# Patient Record
Sex: MALE | Race: WHITE
[De-identification: names, ages, dates, MRNs, and addresses within clinical notes are randomized per-mention and may not be internally consistent; named-entity substitution may affect disease eponyms.]

---

## 2021-03-22 ENCOUNTER — HOSPITAL ENCOUNTER (INPATIENT)
Dept: HOSPITAL 47 - EC | Age: 51
LOS: 10 days | Discharge: HOME HEALTH | DRG: 216 | End: 2021-04-01
Attending: THORACIC SURGERY (CARDIOTHORACIC VASCULAR SURGERY) | Admitting: INTERNAL MEDICINE
Payer: COMMERCIAL

## 2021-03-22 VITALS — BODY MASS INDEX: 31.5 KG/M2

## 2021-03-22 DIAGNOSIS — Z98.890: ICD-10-CM

## 2021-03-22 DIAGNOSIS — I21.4: Primary | ICD-10-CM

## 2021-03-22 DIAGNOSIS — Z72.89: ICD-10-CM

## 2021-03-22 DIAGNOSIS — Z22.321: ICD-10-CM

## 2021-03-22 DIAGNOSIS — I34.0: ICD-10-CM

## 2021-03-22 DIAGNOSIS — I50.23: ICD-10-CM

## 2021-03-22 DIAGNOSIS — I25.10: ICD-10-CM

## 2021-03-22 DIAGNOSIS — Z79.4: ICD-10-CM

## 2021-03-22 DIAGNOSIS — N17.9: ICD-10-CM

## 2021-03-22 DIAGNOSIS — E11.65: ICD-10-CM

## 2021-03-22 DIAGNOSIS — Z20.822: ICD-10-CM

## 2021-03-22 DIAGNOSIS — Z82.49: ICD-10-CM

## 2021-03-22 DIAGNOSIS — G45.9: ICD-10-CM

## 2021-03-22 DIAGNOSIS — Z90.89: ICD-10-CM

## 2021-03-22 DIAGNOSIS — J98.11: ICD-10-CM

## 2021-03-22 DIAGNOSIS — R19.7: ICD-10-CM

## 2021-03-22 DIAGNOSIS — D62: ICD-10-CM

## 2021-03-22 DIAGNOSIS — E78.5: ICD-10-CM

## 2021-03-22 DIAGNOSIS — K59.00: ICD-10-CM

## 2021-03-22 DIAGNOSIS — R04.0: ICD-10-CM

## 2021-03-22 DIAGNOSIS — I25.5: ICD-10-CM

## 2021-03-22 DIAGNOSIS — M81.0: ICD-10-CM

## 2021-03-22 DIAGNOSIS — I27.20: ICD-10-CM

## 2021-03-22 DIAGNOSIS — E78.1: ICD-10-CM

## 2021-03-22 LAB
ALBUMIN SERPL-MCNC: 4 G/DL (ref 3.5–5)
ALP SERPL-CCNC: 94 U/L (ref 38–126)
ALT SERPL-CCNC: 17 U/L (ref 4–49)
ANION GAP SERPL CALC-SCNC: 10 MMOL/L
APTT BLD: 23.1 SEC (ref 22–30)
AST SERPL-CCNC: 43 U/L (ref 17–59)
BASOPHILS # BLD AUTO: 0 K/UL (ref 0–0.2)
BASOPHILS NFR BLD AUTO: 0 %
BUN SERPL-SCNC: 20 MG/DL (ref 9–20)
CALCIUM SPEC-MCNC: 9 MG/DL (ref 8.4–10.2)
CHLORIDE SERPL-SCNC: 102 MMOL/L (ref 98–107)
CO2 SERPL-SCNC: 25 MMOL/L (ref 22–30)
D DIMER PPP FEU-MCNC: 0.59 MG/L FEU (ref ?–0.6)
EOSINOPHIL # BLD AUTO: 0.1 K/UL (ref 0–0.7)
EOSINOPHIL NFR BLD AUTO: 2 %
ERYTHROCYTE [DISTWIDTH] IN BLOOD BY AUTOMATED COUNT: 4.62 M/UL (ref 4.3–5.9)
ERYTHROCYTE [DISTWIDTH] IN BLOOD: 12.3 % (ref 11.5–15.5)
GLUCOSE BLD-MCNC: 302 MG/DL (ref 75–99)
GLUCOSE SERPL-MCNC: 317 MG/DL (ref 74–99)
HCT VFR BLD AUTO: 42.5 % (ref 39–53)
HGB BLD-MCNC: 14.6 GM/DL (ref 13–17.5)
INR PPP: 0.9 (ref ?–1.2)
LYMPHOCYTES # SPEC AUTO: 1 K/UL (ref 1–4.8)
LYMPHOCYTES NFR SPEC AUTO: 14 %
MAGNESIUM SPEC-SCNC: 1.9 MG/DL (ref 1.6–2.3)
MCH RBC QN AUTO: 31.6 PG (ref 25–35)
MCHC RBC AUTO-ENTMCNC: 34.3 G/DL (ref 31–37)
MCV RBC AUTO: 92.1 FL (ref 80–100)
MONOCYTES # BLD AUTO: 0.4 K/UL (ref 0–1)
MONOCYTES NFR BLD AUTO: 5 %
NEUTROPHILS # BLD AUTO: 6 K/UL (ref 1.3–7.7)
NEUTROPHILS NFR BLD AUTO: 78 %
PLATELET # BLD AUTO: 347 K/UL (ref 150–450)
POTASSIUM SERPL-SCNC: 4.4 MMOL/L (ref 3.5–5.1)
PROT SERPL-MCNC: 7.1 G/DL (ref 6.3–8.2)
PT BLD: 10.2 SEC (ref 9–12)
SODIUM SERPL-SCNC: 137 MMOL/L (ref 137–145)
WBC # BLD AUTO: 7.6 K/UL (ref 3.8–10.6)

## 2021-03-22 PROCEDURE — 85379 FIBRIN DEGRADATION QUANT: CPT

## 2021-03-22 PROCEDURE — 93970 EXTREMITY STUDY: CPT

## 2021-03-22 PROCEDURE — 86891 AUTOLOGOUS BLOOD OP SALVAGE: CPT

## 2021-03-22 PROCEDURE — 96374 THER/PROPH/DIAG INJ IV PUSH: CPT

## 2021-03-22 PROCEDURE — 93320 DOPPLER ECHO COMPLETE: CPT

## 2021-03-22 PROCEDURE — 70498 CT ANGIOGRAPHY NECK: CPT

## 2021-03-22 PROCEDURE — 86920 COMPATIBILITY TEST SPIN: CPT

## 2021-03-22 PROCEDURE — 81003 URINALYSIS AUTO W/O SCOPE: CPT

## 2021-03-22 PROCEDURE — 80061 LIPID PANEL: CPT

## 2021-03-22 PROCEDURE — 80074 ACUTE HEPATITIS PANEL: CPT

## 2021-03-22 PROCEDURE — 94640 AIRWAY INHALATION TREATMENT: CPT

## 2021-03-22 PROCEDURE — 94002 VENT MGMT INPAT INIT DAY: CPT

## 2021-03-22 PROCEDURE — 83036 HEMOGLOBIN GLYCOSYLATED A1C: CPT

## 2021-03-22 PROCEDURE — 93458 L HRT ARTERY/VENTRICLE ANGIO: CPT

## 2021-03-22 PROCEDURE — 82805 BLOOD GASES W/O2 SATURATION: CPT

## 2021-03-22 PROCEDURE — 93930 UPPER EXTREMITY STUDY: CPT

## 2021-03-22 PROCEDURE — 87635 SARS-COV-2 COVID-19 AMP PRB: CPT

## 2021-03-22 PROCEDURE — 85730 THROMBOPLASTIN TIME PARTIAL: CPT

## 2021-03-22 PROCEDURE — 99285 EMERGENCY DEPT VISIT HI MDM: CPT

## 2021-03-22 PROCEDURE — 94150 VITAL CAPACITY TEST: CPT

## 2021-03-22 PROCEDURE — 83735 ASSAY OF MAGNESIUM: CPT

## 2021-03-22 PROCEDURE — B2111ZZ FLUOROSCOPY OF MULTIPLE CORONARY ARTERIES USING LOW OSMOLAR CONTRAST: ICD-10-PCS

## 2021-03-22 PROCEDURE — 70496 CT ANGIOGRAPHY HEAD: CPT

## 2021-03-22 PROCEDURE — 84443 ASSAY THYROID STIM HORMONE: CPT

## 2021-03-22 PROCEDURE — 85027 COMPLETE CBC AUTOMATED: CPT

## 2021-03-22 PROCEDURE — 80048 BASIC METABOLIC PNL TOTAL CA: CPT

## 2021-03-22 PROCEDURE — 86900 BLOOD TYPING SEROLOGIC ABO: CPT

## 2021-03-22 PROCEDURE — 93306 TTE W/DOPPLER COMPLETE: CPT

## 2021-03-22 PROCEDURE — 71045 X-RAY EXAM CHEST 1 VIEW: CPT

## 2021-03-22 PROCEDURE — 80053 COMPREHEN METABOLIC PANEL: CPT

## 2021-03-22 PROCEDURE — 93923 UPR/LXTR ART STDY 3+ LVLS: CPT

## 2021-03-22 PROCEDURE — 85610 PROTHROMBIN TIME: CPT

## 2021-03-22 PROCEDURE — 86850 RBC ANTIBODY SCREEN: CPT

## 2021-03-22 PROCEDURE — 93325 DOPPLER ECHO COLOR FLOW MAPG: CPT

## 2021-03-22 PROCEDURE — 71046 X-RAY EXAM CHEST 2 VIEWS: CPT

## 2021-03-22 PROCEDURE — 82330 ASSAY OF CALCIUM: CPT

## 2021-03-22 PROCEDURE — 94760 N-INVAS EAR/PLS OXIMETRY 1: CPT

## 2021-03-22 PROCEDURE — 4A023N7 MEASUREMENT OF CARDIAC SAMPLING AND PRESSURE, LEFT HEART, PERCUTANEOUS APPROACH: ICD-10-PCS

## 2021-03-22 PROCEDURE — 84484 ASSAY OF TROPONIN QUANT: CPT

## 2021-03-22 PROCEDURE — 93308 TTE F-UP OR LMTD: CPT

## 2021-03-22 PROCEDURE — 85025 COMPLETE CBC W/AUTO DIFF WBC: CPT

## 2021-03-22 PROCEDURE — 70450 CT HEAD/BRAIN W/O DYE: CPT

## 2021-03-22 PROCEDURE — 83880 ASSAY OF NATRIURETIC PEPTIDE: CPT

## 2021-03-22 PROCEDURE — 93880 EXTRACRANIAL BILAT STUDY: CPT

## 2021-03-22 PROCEDURE — 93312 ECHO TRANSESOPHAGEAL: CPT

## 2021-03-22 PROCEDURE — 93005 ELECTROCARDIOGRAM TRACING: CPT

## 2021-03-22 PROCEDURE — 87070 CULTURE OTHR SPECIMN AEROBIC: CPT

## 2021-03-22 PROCEDURE — 86901 BLOOD TYPING SEROLOGIC RH(D): CPT

## 2021-03-22 PROCEDURE — 36415 COLL VENOUS BLD VENIPUNCTURE: CPT

## 2021-03-22 RX ADMIN — INSULIN ASPART SCH UNIT: 100 INJECTION, SOLUTION INTRAVENOUS; SUBCUTANEOUS at 22:32

## 2021-03-22 RX ADMIN — METOPROLOL TARTRATE SCH MG: 25 TABLET, FILM COATED ORAL at 22:32

## 2021-03-22 RX ADMIN — INSULIN ASPART SCH: 100 INJECTION, SOLUTION INTRAVENOUS; SUBCUTANEOUS at 19:38

## 2021-03-22 NOTE — ED
General Adult HPI





- General


Chief complaint: Upper Respiratory Infection


Stated complaint: fatigue, vomiting


Time Seen by Provider: 03/22/21 10:42


Source: patient


Mode of arrival: ambulatory


Limitations: no limitations





- History of Present Illness


Initial comments: 


51-year-old male patient presents to the emergency department today for 

evaluation of chest pressure and general malaise.  Patient states his been 

feeling unwell for the last couple of weeks.  States that he initially was just 

feeling very fatigued, chilled, rundown.  States he had some episodes of 

diarrhea early in the illness.  States that the symptoms lasted for about a week

and then started to improve.  Patient states over the last few days his symptoms

have worsened.  States he's been having heavy chest pressure especially at 

night.  States he does feel short of breath with this.  Denies any cough or 

congestion.  Denies any fevers but states he has been chilled.  States he did 

have episode of vomiting last night.  Does have a history of diabetes.  Has 

extensive family history of cardiac disease.   Patient denies any recent rash, 

abdominal pain, nausea, vomiting, back pain, numbness, tingling, dizziness, 

weakness, hematuria, dysuria, urinary urgency, urinary frequency, headache, 

visual changes, or any other complaints.





- Related Data


                                Home Medications











 Medication  Instructions  Recorded  Confirmed


 


Insulin Aspart [NovoLOG Flexpen] See Protocol SQ AC-TID 03/22/21 03/22/21


 


Insulin Glargine,Hum.rec.anlog 20 unit SQ DAILY 03/22/21 03/22/21





[Lantus Solostar]   











                                    Allergies











Allergy/AdvReac Type Severity Reaction Status Date / Time


 


No Known Allergies Allergy   Verified 03/22/21 13:06














Review of Systems


ROS Statement: 


Those systems with pertinent positive or pertinent negative responses have been 

documented in the HPI.





ROS Other: All systems not noted in ROS Statement are negative.





Past Medical History


Past Medical History: Diabetes Mellitus


History of Any Multi-Drug Resistant Organisms: None Reported


Past Surgical History: No Surgical Hx Reported


Past Psychological History: No Psychological Hx Reported


Smoking Status: Never smoker


Past Alcohol Use History: Daily


Past Drug Use History: None Reported





General Exam


Limitations: no limitations


General appearance: alert, in no apparent distress, other (This is a well-

developed, well-nourished adult male patient in no acute distress.  Vital signs 

upon presentation temperature 98.9F, pulse 107, respirations 20, blood pressure

128/83, pulse ox 98% on room air.)


Eye exam: Present: normal appearance, PERRL, EOMI.  Absent: scleral icterus, 

conjunctival injection, periorbital swelling


ENT exam: Present: normal exam, normal oropharynx, mucous membranes moist


Respiratory exam: Present: normal lung sounds bilaterally.  Absent: respiratory 

distress, wheezes, rales, rhonchi, stridor


Cardiovascular Exam: Present: normal rhythm, tachycardia, normal heart sounds.  

Absent: systolic murmur, diastolic murmur, rubs, gallop, clicks


GI/Abdominal exam: Present: soft, normal bowel sounds.  Absent: distended, 

tenderness, guarding, rebound, rigid


Neurological exam: Present: alert, oriented X3, CN II-XII intact


Psychiatric exam: Present: normal affect, normal mood


Skin exam: Present: warm, dry, intact, normal color.  Absent: rash





Course


                                   Vital Signs











  03/22/21 03/22/21





  10:32 12:47


 


Temperature 98.9 F 


 


Pulse Rate 107 H 105 H


 


Respiratory 20 18





Rate  


 


Blood Pressure 128/83 137/92


 


O2 Sat by Pulse 98 96





Oximetry  














EKG Findings





- EKG Comments:


EKG Findings:: EKG obtained at 1122 shows sinus tachycardia with a ventricular 

rate of 105, KS Interval 176, QRS duration 108, , .  No evidence of

ST elevation or depression.





Medical Decision Making





- Medical Decision Making


51-year-old male patient presents to the emergency department today for 

evaluation of chest pressure and shortness of breath.  Patient feeling unwell 

for the last couple of weeks.  Has history of diabetes an extensive family 

history of coronary artery disease.  Physical examination reveals clear equal 

lung sounds.  Abdomen soft and nontender.  EKG showed some ST depression in V5 

and V4.  T-wave inversion in lead 3.  Labs reviewed and did reveal elevated 

troponin 3.1.  Patient symptoms and findings are consistent with NSTEMI.  He'll 

be admitted to the hospital for further cardiac evaluation.  He was given 

aspirin and started on heparin drip.  Case discussed with my attending Dr. Juan.








- Lab Data


Result diagrams: 


                                 03/22/21 11:29





                                 03/22/21 11:29


                                   Lab Results











  03/22/21 03/22/21 03/22/21 Range/Units





  11:29 11:29 11:29 


 


WBC  7.6    (3.8-10.6)  k/uL


 


RBC  4.62    (4.30-5.90)  m/uL


 


Hgb  14.6    (13.0-17.5)  gm/dL


 


Hct  42.5    (39.0-53.0)  %


 


MCV  92.1    (80.0-100.0)  fL


 


MCH  31.6    (25.0-35.0)  pg


 


MCHC  34.3    (31.0-37.0)  g/dL


 


RDW  12.3    (11.5-15.5)  %


 


Plt Count  347    (150-450)  k/uL


 


MPV  6.4    


 


Neutrophils %  78    %


 


Lymphocytes %  14    %


 


Monocytes %  5    %


 


Eosinophils %  2    %


 


Basophils %  0    %


 


Neutrophils #  6.0    (1.3-7.7)  k/uL


 


Lymphocytes #  1.0    (1.0-4.8)  k/uL


 


Monocytes #  0.4    (0-1.0)  k/uL


 


Eosinophils #  0.1    (0-0.7)  k/uL


 


Basophils #  0.0    (0-0.2)  k/uL


 


PT   10.2   (9.0-12.0)  sec


 


INR   0.9   (<1.2)  


 


APTT   23.1   (22.0-30.0)  sec


 


D-Dimer   0.59   (<0.60)  mg/L FEU


 


Sodium    137  (137-145)  mmol/L


 


Potassium    4.4  (3.5-5.1)  mmol/L


 


Chloride    102  ()  mmol/L


 


Carbon Dioxide    25  (22-30)  mmol/L


 


Anion Gap    10  mmol/L


 


BUN    20  (9-20)  mg/dL


 


Creatinine    1.02  (0.66-1.25)  mg/dL


 


Est GFR (CKD-EPI)AfAm    >90  (>60 ml/min/1.73 sqM)  


 


Est GFR (CKD-EPI)NonAf    84  (>60 ml/min/1.73 sqM)  


 


Glucose    317 H  (74-99)  mg/dL


 


Calcium    9.0  (8.4-10.2)  mg/dL


 


Magnesium    1.9  (1.6-2.3)  mg/dL


 


Total Bilirubin    0.5  (0.2-1.3)  mg/dL


 


AST    43  (17-59)  U/L


 


ALT    17  (4-49)  U/L


 


Alkaline Phosphatase    94  ()  U/L


 


Troponin I     (0.000-0.034)  ng/mL


 


NT-Pro-B Natriuret Pep     pg/mL


 


Total Protein    7.1  (6.3-8.2)  g/dL


 


Albumin    4.0  (3.5-5.0)  g/dL


 


Coronavirus (PCR)     (Not Detectd)  














  03/22/21 03/22/21 03/22/21 Range/Units





  11:29 11:29 11:29 


 


WBC     (3.8-10.6)  k/uL


 


RBC     (4.30-5.90)  m/uL


 


Hgb     (13.0-17.5)  gm/dL


 


Hct     (39.0-53.0)  %


 


MCV     (80.0-100.0)  fL


 


MCH     (25.0-35.0)  pg


 


MCHC     (31.0-37.0)  g/dL


 


RDW     (11.5-15.5)  %


 


Plt Count     (150-450)  k/uL


 


MPV     


 


Neutrophils %     %


 


Lymphocytes %     %


 


Monocytes %     %


 


Eosinophils %     %


 


Basophils %     %


 


Neutrophils #     (1.3-7.7)  k/uL


 


Lymphocytes #     (1.0-4.8)  k/uL


 


Monocytes #     (0-1.0)  k/uL


 


Eosinophils #     (0-0.7)  k/uL


 


Basophils #     (0-0.2)  k/uL


 


PT     (9.0-12.0)  sec


 


INR     (<1.2)  


 


APTT     (22.0-30.0)  sec


 


D-Dimer     (<0.60)  mg/L FEU


 


Sodium     (137-145)  mmol/L


 


Potassium     (3.5-5.1)  mmol/L


 


Chloride     ()  mmol/L


 


Carbon Dioxide     (22-30)  mmol/L


 


Anion Gap     mmol/L


 


BUN     (9-20)  mg/dL


 


Creatinine     (0.66-1.25)  mg/dL


 


Est GFR (CKD-EPI)AfAm     (>60 ml/min/1.73 sqM)  


 


Est GFR (CKD-EPI)NonAf     (>60 ml/min/1.73 sqM)  


 


Glucose     (74-99)  mg/dL


 


Calcium     (8.4-10.2)  mg/dL


 


Magnesium     (1.6-2.3)  mg/dL


 


Total Bilirubin     (0.2-1.3)  mg/dL


 


AST     (17-59)  U/L


 


ALT     (4-49)  U/L


 


Alkaline Phosphatase     ()  U/L


 


Troponin I  3.120 H*    (0.000-0.034)  ng/mL


 


NT-Pro-B Natriuret Pep   3030   pg/mL


 


Total Protein     (6.3-8.2)  g/dL


 


Albumin     (3.5-5.0)  g/dL


 


Coronavirus (PCR)    Not Detected  (Not Detectd)  














- EKG Data


-: EKG Interpreted by Me


EKG Comments: 


EKG obtained 1122 shows sinus tachycardia with a ventricular rate of 105, KS 

interval 176, QRS duration 108, , .  He has some ST depression 

noted and V4 and V5.  T-wave inversion in lead 3.








- Radiology Data


Radiology results: report reviewed, image reviewed





One view x-ray of the chest is obtained.  Report reviewed in its entirety.  

Impression by Dr. Sousa shows patchy right perihilar infiltrate may reflect 

developing a morning.  Correlate clinically.





Disposition


Clinical Impression: 


 NSTEMI (non-ST elevated myocardial infarction)





Disposition: ADMITTED AS IP TO THIS [facility]


Condition: Serious


Decision to Admit Reason: Admit from [facility]


Decision Date: 03/22/21


Decision Time: 12:44

## 2021-03-22 NOTE — XR
EXAMINATION TYPE: XR chest 1V portable

 

DATE OF EXAM: 3/22/2021

 

HISTORY: Shortness of breath.

 

COMPARISON: None.

 

TECHNIQUE: Single view of the chest is submitted.

 

FINDINGS:

Demonstrated are scattered senescent parenchymal change.  

 

Patchy right perihilar infiltrate may reflect developing pneumonia. Correlate clinically progress ike
dies are recommended.

 

The heart is stable.

 

Hilar and mediastinal structures are within normal limits.  

 

Degenerative changes are seen of the dorsal spine. 

 

 IMPRESSION: 

 

1.  Patchy right perihilar infiltrate may reflect developing pneumonia. Correlate clinically progress
 studies are recommended.

## 2021-03-22 NOTE — P.HPIM
<Felton Amato - Last Filed: 03/22/21 13:35>





History of Present Illness


H&P Date: 03/22/21


Chief Complaint: Fatigue and chest heaviness





History of presenting illness:





Patient is a 51-year-old male with a past medical history of insulin-dependent 

diabetes mellitus.  He presented to [facility] with a chief 

complaint of fatigue and chest heaviness.  Patient reports he initially began 

feeling the symptoms of fatigue on 3/3/21 accompanied by diarrhea, nausea, 

chills, diaphoresis, and body aches. Pt reports these symptoms lasted for appro

ximately one week and then improved. Pt states this improvement only lasted a 

couple days and then his symptoms came back with a vengeance.  Patient reports 

over the past few days he has felt extremely winded, fatigued, and simply run 

down.  He has had chills and moderate diaphoresis.  Patient states in addition 

to this he has had a constant pressure to his midsternal chest.  Patient reports

this chest pressure feels like a constant heaviness to his midsternal chest 

which seems to worsen in the evenings and at night and is accompanied by 

shortness of breath.  Patient denies having any fevers, headache, 

lightheadedness, dizziness, cough or congestion, abdominal pain, or experiencing

any pain/swelling/weakness/tingling in extremities.  Patient denies a history of

cardiac disease, but reports a significant family history of cardiac disease 

with both of his brothers and father requiring open heart surgery with bypasses 

at a young age.  Patient denies history of tobacco use, drug use and denies EtOH

use/abuse.  Patient reports he follows with cardiologist Dr. Peñaloza in [place]. 

Patient was evaluated in the emergency department found to have an elevated 

troponin of 3.120 with an EKG showing sinus tachycardia at 105 bpm with T-wave 

inversion in leads III and aVF, no signs of ST elevation. Patient given aspirin 

324 mg in ED 1 dose followed by heparin bolus and infusion per ACS protocol for

NSTEMI.  Patient admitted under our services for continued close medical 

management with consult to cardiology.





Review of systems:





Pertinent positives and negatives as discussed in HPI, a complete review of 

systems was performed and all other systems are negative.





Physical exam:





General: non toxic, no distress, appears at stated age


Derm: warm, dry


Head: atraumatic, normocephalic, symmetric


Eyes: EOMI, no lid lag, anicteric sclera


Mouth: no lip lesion, mucus membranes moist


Cardiovascular: S1S2 normal with regular rate and rhythm.  No murmur, gallop, or

rub noted.  Posterior tibial pulses palpated bilaterally.  No lower extremity 

edema.


Lungs: Respirations even, regular, and unlabored on room air.  Lungs CTA 

bilaterally


Abdominal: soft, nontender to palpation, no guarding, no appreciable 

organomegaly


Ext: no gross muscle atrophy, no edema, no contractures


Neuro: CN II-XI grossly intact, no focal neuro deficits


Psych: Alert, oriented, appropriate affect 








Assessment and Plan of Care:





NSTEMI


-Troponin 3.120


-EKG showing sinus tachycardia at 105 bpm with T-wave inversion in leads III and

aVF, no signs of ST elevation.  No previous EKGs available for comparison.


-Patient given aspirin 324 mg in ED 1 dose followed by heparin bolus and 

infusion per ACS protocol for NSTEMI.


-Continue heparin infusion, pharmacy to dose.


-Cardiology consult.


-Telemetry monitoring


-Trend troponins every 3 hours 2.


-Close monitoring of I's and O's


-Heart healthy carb consistent diet, NPO at midnight


-Patient being placed on daily aspirin, atorvastatin, and metoprolol.


-Lipid profile and Hgb A1c with a.m. labs. 





Insulin-dependent diabetes mellitus type II


-Glycemic protocol with NovoLog sliding scale and continuation of Lantus 20 

units daily.


-Heart healthy carb consistent diet








The patient is admitted with an anticipated greater than 2 midnight stay for 

evaluation of NSTEMI.


Surrogate decision-maker: Self


CODE STATUS: Full code


DVT prophylaxis: Heparin


Discussed with: Patient


Anticipated discharge date: Clinical course to determine


Anticipated discharge place: Home


A total of 45 minutes was spent on the care of this complex patient more than 

50% of the time was spent in counseling and care coordination.











Past Medical History


Past Medical History: Diabetes Mellitus


History of Any Multi-Drug Resistant Organisms: None Reported


Past Surgical History: No Surgical Hx Reported


Past Psychological History: No Psychological Hx Reported


Smoking Status: Never smoker


Past Alcohol Use History: Daily


Past Drug Use History: None Reported





Medications and Allergies


                                Home Medications











 Medication  Instructions  Recorded  Confirmed  Type


 


Insulin Aspart [NovoLOG Flexpen] See Protocol SQ AC-TID 03/22/21 03/22/21 

History


 


Insulin Glargine,Hum.rec.anlog 20 unit SQ DAILY 03/22/21 03/22/21 History





[Mari Julien]    








                                    Allergies











Allergy/AdvReac Type Severity Reaction Status Date / Time


 


No Known Allergies Allergy   Verified 03/22/21 13:06














Physical Exam


Vitals: 


                                   Vital Signs











  Temp Pulse Resp BP Pulse Ox


 


 03/22/21 12:47   105 H  18  137/92  96


 


 03/22/21 10:32  98.9 F  107 H  20  128/83  98








                                Intake and Output











 03/21/21 03/22/21 03/22/21





 22:59 06:59 14:59


 


Other:   


 


  Weight   81.647 kg














Results


CBC & Chem 7: 


                                 03/22/21 11:29





                                 03/22/21 11:29


Labs: 


                  Abnormal Lab Results - Last 24 Hours (Table)











  03/22/21 03/22/21 Range/Units





  11:29 11:29 


 


Glucose  317 H   (74-99)  mg/dL


 


Troponin I   3.120 H*  (0.000-0.034)  ng/mL














<Casi Rudolph - Last Filed: 03/22/21 20:33>





Physical Exam


Osteopathic Statement: *.  No significant issues noted on an osteopathic 

structural exam other than those noted in the History and Physical/Consult.


Vitals: 


                                   Vital Signs











  Temp Pulse Resp BP Pulse Ox


 


 03/22/21 20:00   116 H  18  138/73  91 L


 


 03/22/21 17:37  98.2 F  105 H  18  139/96  93 L


 


 03/22/21 15:50   97  18  138/93  97


 


 03/22/21 12:47   105 H  18  137/92  96


 


 03/22/21 10:32  98.9 F  107 H  20  128/83  98








                                Intake and Output











 03/22/21 03/22/21 03/22/21





 06:59 14:59 22:59


 


Other:   


 


  Weight  81.647 kg 














Results


CBC & Chem 7: 


                                 03/22/21 11:29





                                 03/22/21 11:29


Labs: 


                  Abnormal Lab Results - Last 24 Hours (Table)











  03/22/21 03/22/21 03/22/21 Range/Units





  11:29 11:29 14:09 


 


APTT     (22.0-30.0)  sec


 


Glucose  317 H    (74-99)  mg/dL


 


Troponin I   3.120 H*  6.080 H*  (0.000-0.034)  ng/mL














  03/22/21 03/22/21 Range/Units





  18:38 18:38 


 


APTT  30.1 H   (22.0-30.0)  sec


 


Glucose    (74-99)  mg/dL


 


Troponin I   7.840 H*  (0.000-0.034)  ng/mL














Assessment and Plan


Assessment: 


Patient seen and examined independently.  Patient was also seen by Felton Amato NP and case was discussed.  I am in agreement with subjective, physical exam, 

assessment and plan as written above and amended below.





Currently chest pain-free.  No shortness of breath.  No nausea.  No vomiting.





General: non toxic, no distress, appears at stated age


Derm: warm, dry


Head: atraumatic, normocephalic, symmetric


Eyes: EOMI, no lid lag, anicteric sclera


Mouth: no lip lesion, mucus membranes moist


Cardiovascular: S1S2 reg, no murmur, positive posterior tibial pulse bilateral, 


Lungs: CTA bilateral, no rhonchi, no rales , no accessory muscle use


Abdominal: soft,  nontender to palpation, no guarding, no appreciable 

organomegaly


Ext: no gross muscle atrophy, no edema, no contractures


Neuro:  CN II-XI grossly intact, no focal neuro deficits


Psych: Alert, oriented, appropriate affect 





Non-STEMI


-Heparin drip


-Beta blocker


-Aspirin


-Await cardiology recommendations


-Patient would like cardiology to discuss his case with his outpatient 

cardiologist.


-Nothing by mouth after midnight in anticipation of cardiac catheterization


- D/W nursing to alert cardiology of incresaed trop from earlier today.

## 2021-03-23 LAB
ANION GAP SERPL CALC-SCNC: 4 MMOL/L
BASOPHILS # BLD AUTO: 0 K/UL (ref 0–0.2)
BASOPHILS NFR BLD AUTO: 0 %
BUN SERPL-SCNC: 21 MG/DL (ref 9–20)
CALCIUM SPEC-MCNC: 8.2 MG/DL (ref 8.4–10.2)
CHLORIDE SERPL-SCNC: 105 MMOL/L (ref 98–107)
CHOLEST SERPL-MCNC: 241 MG/DL (ref ?–200)
CO2 SERPL-SCNC: 25 MMOL/L (ref 22–30)
EOSINOPHIL # BLD AUTO: 0 K/UL (ref 0–0.7)
EOSINOPHIL NFR BLD AUTO: 0 %
ERYTHROCYTE [DISTWIDTH] IN BLOOD BY AUTOMATED COUNT: 3.93 M/UL (ref 4.3–5.9)
ERYTHROCYTE [DISTWIDTH] IN BLOOD: 11.8 % (ref 11.5–15.5)
GLUCOSE BLD-MCNC: 153 MG/DL (ref 75–99)
GLUCOSE BLD-MCNC: 204 MG/DL (ref 75–99)
GLUCOSE BLD-MCNC: 282 MG/DL (ref 75–99)
GLUCOSE BLD-MCNC: 292 MG/DL (ref 75–99)
GLUCOSE SERPL-MCNC: 278 MG/DL (ref 74–99)
HBA1C MFR BLD: 8.2 % (ref 4–6)
HCT VFR BLD AUTO: 36.2 % (ref 39–53)
HDLC SERPL-MCNC: 37 MG/DL (ref 40–60)
HGB BLD-MCNC: 12.7 GM/DL (ref 13–17.5)
LDLC SERPL CALC-MCNC: 164 MG/DL (ref 0–99)
LYMPHOCYTES # SPEC AUTO: 1.1 K/UL (ref 1–4.8)
LYMPHOCYTES NFR SPEC AUTO: 10 %
MCH RBC QN AUTO: 32.2 PG (ref 25–35)
MCHC RBC AUTO-ENTMCNC: 35 G/DL (ref 31–37)
MCV RBC AUTO: 92 FL (ref 80–100)
MONOCYTES # BLD AUTO: 0.6 K/UL (ref 0–1)
MONOCYTES NFR BLD AUTO: 5 %
NEUTROPHILS # BLD AUTO: 8.9 K/UL (ref 1.3–7.7)
NEUTROPHILS NFR BLD AUTO: 83 %
PLATELET # BLD AUTO: 345 K/UL (ref 150–450)
POTASSIUM SERPL-SCNC: 4.3 MMOL/L (ref 3.5–5.1)
SODIUM SERPL-SCNC: 134 MMOL/L (ref 137–145)
TRIGL SERPL-MCNC: 200 MG/DL (ref ?–150)
WBC # BLD AUTO: 10.7 K/UL (ref 3.8–10.6)

## 2021-03-23 RX ADMIN — INSULIN ASPART SCH UNIT: 100 INJECTION, SOLUTION INTRAVENOUS; SUBCUTANEOUS at 06:41

## 2021-03-23 RX ADMIN — INSULIN ASPART SCH: 100 INJECTION, SOLUTION INTRAVENOUS; SUBCUTANEOUS at 20:17

## 2021-03-23 RX ADMIN — ATORVASTATIN CALCIUM SCH MG: 80 TABLET, FILM COATED ORAL at 09:15

## 2021-03-23 RX ADMIN — METOPROLOL TARTRATE SCH: 25 TABLET, FILM COATED ORAL at 09:22

## 2021-03-23 RX ADMIN — ASPIRIN 81 MG CHEWABLE TABLET SCH: 81 TABLET CHEWABLE at 09:18

## 2021-03-23 RX ADMIN — INSULIN ASPART SCH UNIT: 100 INJECTION, SOLUTION INTRAVENOUS; SUBCUTANEOUS at 17:16

## 2021-03-23 RX ADMIN — INSULIN ASPART SCH UNIT: 100 INJECTION, SOLUTION INTRAVENOUS; SUBCUTANEOUS at 12:43

## 2021-03-23 RX ADMIN — HEPARIN SODIUM SCH MLS/HR: 10000 INJECTION, SOLUTION INTRAVENOUS at 14:26

## 2021-03-23 RX ADMIN — INSULIN DETEMIR SCH: 100 INJECTION, SOLUTION SUBCUTANEOUS at 09:15

## 2021-03-23 RX ADMIN — NITROGLYCERIN SCH INCH: 20 OINTMENT TOPICAL at 17:17

## 2021-03-23 RX ADMIN — NITROGLYCERIN SCH INCH: 20 OINTMENT TOPICAL at 13:48

## 2021-03-23 RX ADMIN — METOPROLOL TARTRATE SCH MG: 25 TABLET, FILM COATED ORAL at 20:17

## 2021-03-23 NOTE — P.CARDCATH
Description of Procedure: 


PROCEDURES PERFORMED: Left heart catheterization, bilateral coronary angiography





INDICATION: NSTEMI





HISTORY: Patient is a pleasant 51-year-old male with history of hypertension, 

hyperlipidemia, diabetes mellitus type 2 insulin-dependent, and strong family 

history of coronary artery disease who presents with symptoms of not feeling 

well for 3 weeks and some GI symptoms with worsened chest pressure over last few

days.  Patient was found to have non-STEMI and therefore heart catheterization 

was recommended.





CONSENT:I have discussed the risks, benefits and alternative therapies for the 

above-mentioned procedure and for both sedation/analgesia as well as necessary 

blood product administration, if indicated, as they pertain to this patient.  

The patient has indicated understanding and acceptance of the risks and 

procedures discussed.





PROCEDURE: After the risks, benefits and alternatives of the above mentioned 

procedure explained in detail with the patient, informed consent was obtained.  

Patient was taken to the catheterization lab and prepped and draped in usual 

fashion.  1% lidocaine was used to anesthetize the right radial artery.  A 6-

R[place] sheath was placed in the right radial artery using modified Seldinger 

technique.  Left coronary angiography was performed with a 5-[place]and[place] JL 3.5 

catheter and right coronary angiography was performed with a 5-[place] JR5 

catheter in various views.  A 5-[place]and[place] FR5 catheter was inserted into the left 

ventricle and pressure measurements were obtained.  The right radial sheath was 

removed and a TR band was placed with hemostasis achieved.  The patient 

tolerated the procedure well.  Patient was transported back to the post 

catheterization holding area in stable condition.





Conscious Sedation: Patient was monitored under the direct supervision of vision

of myself for conscious sedation using Versed and fentanyl for a total duration 

of 18 minutes   


HEMODYNAMICS: 


Aorta: 122/72


LV: 118/7, LVEDP 17 mmHg





SELECTIVE CORONARY ARTERIOGRAPHY: 


LEFT MAIN: The left main is a large caliber vessel which bifurcates into the LAD

and circumflex.  There is a distal 20% left main stenosis.


LEFT ANTERIOR DESCENDING CORONARY ARTERY: LAD is a moderatecaliber vessel which 

wraps around to the apex.  There is a ostial 85% LAD stenosis, a 60-70% mid LAD 

stenosis after a diagonal 1 branch and an apical 50% stenosis.  Diagonal 1 is a 

small caliber vessel with a proximal 80% stenosis.  Diagonal 2 is small caliber 

with a proximal 90% stenosis. 


LEFT CIRCUMFLEX CORONARY ARTERY: Left circumflex is a moderate to large caliber 

vessel with a mid 50% stenosis and gives off a moderate to large caliber OM1.  

There are left to right collaterals to the PLV.  


RIGHT CORONARY ARTERY: The right coronary artery is a small caliber vessel which

gives off a PDA and PLV branch and is the dominant vessel.  There is a mid RCA 

95% stenosis followed by a 100% distal RCA stenosis.





FINAL IMPRESSION: 


1.  Multivessel CAD as described above including ostial LAD 85%, mid LAD 60-70%,

apical LAD 50%, diagonal 1 80%, diagonal 2 90%, circumflex 50% stenosis and RCA 

90%, 100% stenosis. 


2.  Mildly elevated left sided filling pressures.





PLAN: 


1.  Aggressive risk factor modification per most recent ACC/AHA guidelines.


2.  CABG evaluation.

## 2021-03-23 NOTE — P.PN
Subjective


Progress Note Date: 03/23/21


Principal diagnosis: 





NSTEMI








Hospital course:





Patient is a 51-year-old male with a past medical history of insulin-dependent 

diabetes mellitus.  He presented to [facility] with a chief 

complaint of fatigue and chest heaviness/pressure. Patient was evaluated in the 

emergency department found to have an elevated troponin of 3.120 with an EKG 

showing sinus tachycardia at 105 bpm with T-wave inversion in leads III and aVF,

no signs of ST elevation. Patient given aspirin 324 mg in ED 1 dose followed by

heparin bolus and infusion per ACS protocol for NSTEMI.  Patient admitted under 

our services for continued close medical management with consult to cardiology. 

Troponins trended upward with initial troponin is 3.120  with repeats of 6.080 

and 7.840. Patient taken for cardiac cath this morning with Dr. Luna.





Physical exam:


Patient seen and evaluated at the bedside this morning.  He reports improvement 

of chest heaviness/pressure but reports it remains at 3 out of 10 from previous 

8 out of 10.  Patient scheduled for cardiac cath this morning with Dr. Luna.

 Patient denies any other complaints at this time including headache, 

lightheadedness, dizziness, palpitations, shortness of breath, abdominal pain, 

nausea, or experiencing any pain/tingling/numbness/weakness in extremities.  





General: non toxic, no distress, appears at stated age


Derm: warm, dry


Head: atraumatic, normocephalic, symmetric


Eyes: EOMI, no lid lag, anicteric sclera


Mouth: no lip lesion, mucus membranes moist


Cardiovascular: S1S2 normal with regular rate and rhythm.  No murmur, gallop, or

rub noted.  Posterior tibial pulses palpated bilaterally.  No lower extremity 

edema.


Lungs: Respirations even, regular, and unlabored on room air.  Lungs CTA 

bilaterally


Abdominal: soft, nontender to palpation, no guarding, no appreciable 

organomegaly


Ext: no gross muscle atrophy, no edema, no contractures


Neuro: CN II-XI grossly intact, no focal neuro deficits


Psych: Alert, oriented, appropriate affect 








Assessment and Plan of Care:





NSTEMI


-Troponin 3.120. Troponins trended upward with initial troponin is 3.120  with 

repeats of 6.080 and 7.840.


-EKG showing sinus tachycardia at 105 bpm with T-wave inversion in leads III and

aVF, no signs of ST elevation.  No previous EKGs available for comparison.


-Continue heparin infusion, pharmacy to dose.


-Cardiology following, patient scheduled to have cardiac cath done this morning.


-Telemetry monitoring.


-Trend troponins every 3 hours 2.


-Close monitoring of I's and O's.


-Heart healthy carb consistent diet, NPO at midnight.


-Patient being placed on daily aspirin, atorvastatin, and metoprolol.


-Lipid profile revealed elevation of triglycerides 200, cholesterol 241, LDL 1

64, HDL of 37.  Atorvastatin increased to 80 mg nightly.


-Hemoglobin A1c pending.





Insulin-dependent diabetes mellitus type II


-Glycemic protocol with NovoLog sliding scale and continuation of Lantus 20 

units daily.


-Heart healthy carb consistent diet








The patient is admitted with an anticipated greater than 2 midnight stay for 

evaluation of NSTEMI.


Surrogate decision-maker: Self


CODE STATUS: Full code


DVT prophylaxis: Heparin


Discussed with: Patient


Anticipated discharge date: Clinical course to determine


Anticipated discharge place: Home


A total of 45 minutes was spent on the care of this complex patient more than 

50% of the time was spent in counseling and care coordination.








Objective





- Vital Signs


Vital signs: 


                                   Vital Signs











Temp  99.6 F   03/23/21 08:00


 


Pulse  91   03/23/21 10:22


 


Resp  20   03/23/21 10:22


 


BP  117/70   03/23/21 10:22


 


Pulse Ox  95   03/23/21 10:22








                                 Intake & Output











 03/22/21 03/23/21 03/23/21





 18:59 06:59 18:59


 


Intake Total  171.380 102


 


Balance  171.380 102


 


Weight 81.647 kg 84.5 kg 


 


Intake:   


 


  IV   102


 


  Intake, IV Titration  171.380 





  Amount   


 


    Heparin Sod,Pork in 0.45%  171.380 





    NaCl 25,000 unit In 0.45   





    % NaCl 1 250ml.bag @ 12   





    UNITS/KG/HR 9.798 mls/hr   





    IV .Q24H DEANNA Rx#:   





    [id number]   


 


Other:   


 


  Voiding Method  Urinal 


 


  # Voids  1 














- Labs


CBC & Chem 7: 


                                 03/23/21 02:37





                                 03/23/21 02:37


Labs: 


                  Abnormal Lab Results - Last 24 Hours (Table)











  03/22/21 03/22/21 03/22/21 Range/Units





  11:29 11:29 14:09 


 


WBC     (3.8-10.6)  k/uL


 


RBC     (4.30-5.90)  m/uL


 


Hgb     (13.0-17.5)  gm/dL


 


Hct     (39.0-53.0)  %


 


Neutrophils #     (1.3-7.7)  k/uL


 


APTT     (22.0-30.0)  sec


 


Sodium     (137-145)  mmol/L


 


BUN     (9-20)  mg/dL


 


Glucose  317 H    (74-99)  mg/dL


 


POC Glucose (mg/dL)     (75-99)  mg/dL


 


Calcium     (8.4-10.2)  mg/dL


 


Troponin I   3.120 H*  6.080 H*  (0.000-0.034)  ng/mL


 


Triglycerides     (<150)  mg/dL


 


Cholesterol     (<200)  mg/dL


 


LDL Cholesterol, Calc     (0-99)  mg/dL


 


HDL Cholesterol     (40-60)  mg/dL














  03/22/21 03/22/21 03/22/21 Range/Units





  18:38 18:38 22:26 


 


WBC     (3.8-10.6)  k/uL


 


RBC     (4.30-5.90)  m/uL


 


Hgb     (13.0-17.5)  gm/dL


 


Hct     (39.0-53.0)  %


 


Neutrophils #     (1.3-7.7)  k/uL


 


APTT  30.1 H    (22.0-30.0)  sec


 


Sodium     (137-145)  mmol/L


 


BUN     (9-20)  mg/dL


 


Glucose     (74-99)  mg/dL


 


POC Glucose (mg/dL)    302 H  (75-99)  mg/dL


 


Calcium     (8.4-10.2)  mg/dL


 


Troponin I   7.840 H*   (0.000-0.034)  ng/mL


 


Triglycerides     (<150)  mg/dL


 


Cholesterol     (<200)  mg/dL


 


LDL Cholesterol, Calc     (0-99)  mg/dL


 


HDL Cholesterol     (40-60)  mg/dL














  03/23/21 03/23/21 03/23/21 Range/Units





  02:37 02:37 02:37 


 


WBC   10.7 H   (3.8-10.6)  k/uL


 


RBC   3.93 L   (4.30-5.90)  m/uL


 


Hgb   12.7 L   (13.0-17.5)  gm/dL


 


Hct   36.2 L   (39.0-53.0)  %


 


Neutrophils #   8.9 H   (1.3-7.7)  k/uL


 


APTT    41.2 H  (22.0-30.0)  sec


 


Sodium  134 L    (137-145)  mmol/L


 


BUN  21 H    (9-20)  mg/dL


 


Glucose  278 H    (74-99)  mg/dL


 


POC Glucose (mg/dL)     (75-99)  mg/dL


 


Calcium  8.2 L    (8.4-10.2)  mg/dL


 


Troponin I     (0.000-0.034)  ng/mL


 


Triglycerides  200 H    (<150)  mg/dL


 


Cholesterol  241 H    (<200)  mg/dL


 


LDL Cholesterol, Calc  164 H    (0-99)  mg/dL


 


HDL Cholesterol  37 L    (40-60)  mg/dL














  03/23/21 03/23/21 03/23/21 Range/Units





  06:35 08:24 08:24 


 


WBC     (3.8-10.6)  k/uL


 


RBC     (4.30-5.90)  m/uL


 


Hgb     (13.0-17.5)  gm/dL


 


Hct     (39.0-53.0)  %


 


Neutrophils #     (1.3-7.7)  k/uL


 


APTT   41.4 H   (22.0-30.0)  sec


 


Sodium     (137-145)  mmol/L


 


BUN     (9-20)  mg/dL


 


Glucose     (74-99)  mg/dL


 


POC Glucose (mg/dL)  282 H    (75-99)  mg/dL


 


Calcium     (8.4-10.2)  mg/dL


 


Troponin I    7.700 H*  (0.000-0.034)  ng/mL


 


Triglycerides     (<150)  mg/dL


 


Cholesterol     (<200)  mg/dL


 


LDL Cholesterol, Calc     (0-99)  mg/dL


 


HDL Cholesterol     (40-60)  mg/dL

## 2021-03-23 NOTE — US
EXAMINATION TYPE: US carotid duplex BILAT

 

DATE OF EXAM: 3/23/2021

 

COMPARISON: NONE

 

CLINICAL HISTORY: preop cardiac surgery. Diabetic, Pre CABG evaluation; CAD; thick blood and on blood
 thinner per patient.

 

EXAM MEASUREMENTS: 

 

RIGHT:  Peak Systolic Velocity (PSV) cm/sec

----- Right CCA:  121.0  

----- Right ICA:  124.2     

----- Right ECA:  140.8   

ICA/CCA ratio:  1.0    

 

RIGHT:  End Diastole cm/sec

----- Right CCA:  35.4   

----- Right ICA:  48.3      

----- Right ECA:  21.0     

 

LEFT:  Peak Systolic Velocity (PSV) cm/sec

----- Left CCA:  60.3  

----- Left ICA:  401.0 bulb

----- Left ECA:  119.7  

ICA/CCA ratio:  6.6  

 

LEFT:  End Diastole cm/sec

----- Left CCA:  20.8  

----- Left ICA:  142.1   

----- Left ECA:  17.5 

 

VERTEBRALS (direction of flow):

Right Vertebral: Antegrade

Left Vertebral: Antegrade

 

Rhythm:  Normal

 

Severe stenosis is noted at Left ICA proximally with abnormally elevated PSV and EDV. Abnormal Left I
CA/ CCA ratio is also documented.Tech findings reported to patient's RN, Eve, at exam's end. JJ

 

Grayscale, color Doppler, spectral Doppler imaging performed of the carotid arteries. Waveform analys
is show significant stenosis of the proximal internal carotid artery on the left, atheromatous change
 present at the carotid bulbs. There is spectral broadening, loss of the systolic window in the proxi
mal internal carotid artery in the left

 

IMPRESSION:  Hemodynamic significant stenosis of the proximal internal carotid artery on the left cor
responding to greater than 70% diameter reduction by Doppler criteria, an indirect measurement of car
otid stenosis   

 

 

Criteria for Assigning % of Stenosis / Diameter reduction

(Estimation based on the indirect measurements of the internal carotid artery velocities (ICA PSV).

1.  Normal (no stenosis)=ICA PSV < 125 cm/s: ratio < 2.0: ICA EDV<40 cm/s.

2. Less than 50% stenosis=ICA PSV < 125 cm/s: ratio < 2.0: ICA EDV<40 cm/s.

3.  50 to 69% stenosis=ICA PSV of 125 to 230 cm/s: ration 2.0 ? 4.0: ICA EDV  cm/s.

4.  Greater than 70% stenosis to near occlusion= ICA PSV > 230 cm/s: ratio > 4.0: ICA EDV > 100 cm/s.
 

5.  Near occlusion= ICA PSV velocities may be low or undetectable: variable ratio and ICA EDV.

6.  Total occlusion=unable to detect flow.

 

 

 

 

 

A Yellow level critical message alert has been initiated for Fatuma Ro via the [employer]
tical Results System on 3/23/2021 1:18 PM.  This message alert has been sent to Fatuma Ro via the pr
eferences provided by the clinician for the receipt of Radiology Critical Findings. Message ID [id number]
6.

## 2021-03-23 NOTE — P.GSCN
History of Present Illness


Consult date: 03/23/21


Reason for Consult: 





Coronary artery disease, mitral regurgitation


Requesting physician: Curtis Luna


History of present illness: 





This is a 51-year-old active gentleman who follows on an outpatient basis with 

Dr. Sacha Pennington for primary care and Dr. Manolo Chavez for cardiology.  He has 

a known history of insulin-dependent diabetes for the last 7-8 years along with 

family history of premature coronary artery disease with father and 2 brothers 

diagnosed with CAD with subsequent CABG in their early to mid 50s.  Apparently 

he began experiencing chest heaviness approximated 2 weeks ago primarily with 

laying down at night with resolution upon sitting up.  He was pretty active 

during the day.  Subsequently he began to experience generalized malaise and 

fatigue, diaphoresis, nausea, chills, body aches along with a couple of days of 

diarrhea.  His symptoms did resolve for a couple of days and then returned.  He 

was due to go to [place] and was concerned that he might have Covid so he came 

into the emergency room for testing which was negative.  He was, however, 

discovered to have EKG changes with T-wave inversion in lead 3 and aVF.  

Troponins were elevated at 3.1 with max troponin 7.8 and he was ruled in for 

non-STEMI.  BNP was 3030, WBC 7.6, hemoglobin 14.6, negative d-dimer, BUN 20, 

creatinine 1.02, and lipid panel was elevated.  The patient was started on IV 

heparin and was admitted for evaluation and treatment with consultation placed 

to cardiology.  He was recommended to undergo heart catheterization which was 

completed today by Dr. Luna and which demonstrated ostial LAD stenosis 85%, 

mid LAD stenosis 60-70%, first diagonal stenosis 80%, second diagonal stenosis 

90%, mid RCA stenosis 95% followed by 100% distal RCA stenosis, and mid 

circumflex stenosis 50% with left to right collaterals to the PLV.  

Transthoracic echocardiogram was completed, report not finalized but the patient

was noted to have 3+ mitral regurgitation.  Due to these findings consultation 

was placed to Dr. Elise from cardiothoracic surgery for recommendations.





Review of Systems





- Constitutional


Reports as per HPI, Reports chills, Reports fatigue, Reports malaise





- Cardiovascular


Cardiovascular Comment(s): 





Chest heaviness


Reports as per HPI





- Gastrointestinal


Reports as per HPI, Reports diarrhea, Reports nausea





- Integumentary


Integumentary Comment(s): 





Diaphoresis


Reports as per HPI





Past Medical History


Past Medical History: Diabetes Mellitus


History of Any Multi-Drug Resistant Organisms: None Reported


Past Surgical History: Tonsillectomy


Past Anesthesia/Blood Transfusion Reactions: No Reported Reaction


Past Psychological History: No Psychological Hx Reported


Smoking Status: Never smoker


Past Alcohol Use History: Daily


Additional Past Alcohol Use History / Comment(s): Drinks 3-4 drinks most days; 

has never experienced withdrawal


Past Drug Use History: None Reported





- Past Family History


  ** Father


Family Medical History: Coronary Artery Disease (CAD), Myocardial Infarction 

(MI)


Additional Family Medical History / Comment(s): CABG





  ** Brother(s)


Family Medical History: Coronary Artery Disease (CAD), Myocardial Infarction 

(MI)


Additional Family Medical History / Comment(s): CABG





  ** Mother


Family Medical History: Coronary Artery Disease (CAD)





Medications and Allergies


                                Home Medications











 Medication  Instructions  Recorded  Confirmed  Type


 


Insulin Aspart [NovoLOG Flexpen] See Protocol SQ AC-TID 03/22/21 03/22/21 

History


 


Insulin Glargine,Hum.rec.anlog 20 unit SQ DAILY 03/22/21 03/22/21 History





[Lantus Solostar]    








                                    Allergies











Allergy/AdvReac Type Severity Reaction Status Date / Time


 


No Known Allergies Allergy   Verified 03/22/21 13:06














Surgical - Exam


                                   Vital Signs











Temp Pulse Resp BP Pulse Ox


 


 98.9 F   107 H  20   128/83   98 


 


 03/22/21 10:32  03/22/21 10:32  03/22/21 10:32  03/22/21 10:32  03/22/21 10:32














CONSTITUTIONAL: Awake and alert, appears comfortable, cooperative, well-dev

eloped, well-nourished, no pain, no acute distress


EYES:  Pupils equal, round, reactive to light, normal ocular movement


ENT:  Moist mucous membranes without oral lesions present 


NECK:  No masses, no bruits, trachea midline


RESPIRATORY:  Lungs sounds clear to auscultation bilaterally.  Respirations 

even, nonlabored.  Currently on 2 L nasal cannula with oxygen saturation 95%.  

Strong cough.  No chest wall deformities.  No clubbing or cyanosis present


CARDIOVASCULAR:  S1, S2 present.  Regular rate and rhythm, sinus rhythm on 

telemetry.  Palpable peripheral pulses bilaterally.  No edema present.  No calf 

pain or tenderness noted.  No significant lower extremity varicosities noted.  

Left radial Jose Guadalupe's test less than 8 seconds.


GASTROINTESTINAL:  Abdomen soft, nontender, nondistended without masses or 

organomegaly noted.  There is no rebound or guarding present.  Active bowel 

sounds present 4 quadrants.  


GENITOURINARY:  Deferred


INTEGUMENTARY:  Skin is warm and dry with evidence of good perfusion.  


NEUROLOGIC:  Cranial nerves II through XII intact, normal coordination, no 

obvious motor or sensory deficits, speech is normal


MUSKULOSKELETAL:  Able to move all extremities, strength equal bilaterally, 

normal posture


PSYCHIATRIC:  Alert and oriented to person place and time, appropriate affect, 

intact judgment and insight








Results





- Labs





                                 03/23/21 02:37





                                 03/23/21 02:37


                  Abnormal Lab Results - Last 24 Hours (Table)











  03/22/21 03/22/21 03/22/21 Range/Units





  11:29 11:29 14:09 


 


WBC     (3.8-10.6)  k/uL


 


RBC     (4.30-5.90)  m/uL


 


Hgb     (13.0-17.5)  gm/dL


 


Hct     (39.0-53.0)  %


 


Neutrophils #     (1.3-7.7)  k/uL


 


APTT     (22.0-30.0)  sec


 


Sodium     (137-145)  mmol/L


 


BUN     (9-20)  mg/dL


 


Glucose  317 H    (74-99)  mg/dL


 


POC Glucose (mg/dL)     (75-99)  mg/dL


 


Calcium     (8.4-10.2)  mg/dL


 


Troponin I   3.120 H*  6.080 H*  (0.000-0.034)  ng/mL


 


Triglycerides     (<150)  mg/dL


 


Cholesterol     (<200)  mg/dL


 


LDL Cholesterol, Calc     (0-99)  mg/dL


 


HDL Cholesterol     (40-60)  mg/dL














  03/22/21 03/22/21 03/22/21 Range/Units





  18:38 18:38 22:26 


 


WBC     (3.8-10.6)  k/uL


 


RBC     (4.30-5.90)  m/uL


 


Hgb     (13.0-17.5)  gm/dL


 


Hct     (39.0-53.0)  %


 


Neutrophils #     (1.3-7.7)  k/uL


 


APTT  30.1 H    (22.0-30.0)  sec


 


Sodium     (137-145)  mmol/L


 


BUN     (9-20)  mg/dL


 


Glucose     (74-99)  mg/dL


 


POC Glucose (mg/dL)    302 H  (75-99)  mg/dL


 


Calcium     (8.4-10.2)  mg/dL


 


Troponin I   7.840 H*   (0.000-0.034)  ng/mL


 


Triglycerides     (<150)  mg/dL


 


Cholesterol     (<200)  mg/dL


 


LDL Cholesterol, Calc     (0-99)  mg/dL


 


HDL Cholesterol     (40-60)  mg/dL














  03/23/21 03/23/21 03/23/21 Range/Units





  02:37 02:37 02:37 


 


WBC   10.7 H   (3.8-10.6)  k/uL


 


RBC   3.93 L   (4.30-5.90)  m/uL


 


Hgb   12.7 L   (13.0-17.5)  gm/dL


 


Hct   36.2 L   (39.0-53.0)  %


 


Neutrophils #   8.9 H   (1.3-7.7)  k/uL


 


APTT    41.2 H  (22.0-30.0)  sec


 


Sodium  134 L    (137-145)  mmol/L


 


BUN  21 H    (9-20)  mg/dL


 


Glucose  278 H    (74-99)  mg/dL


 


POC Glucose (mg/dL)     (75-99)  mg/dL


 


Calcium  8.2 L    (8.4-10.2)  mg/dL


 


Troponin I     (0.000-0.034)  ng/mL


 


Triglycerides  200 H    (<150)  mg/dL


 


Cholesterol  241 H    (<200)  mg/dL


 


LDL Cholesterol, Calc  164 H    (0-99)  mg/dL


 


HDL Cholesterol  37 L    (40-60)  mg/dL














  03/23/21 03/23/21 03/23/21 Range/Units





  06:35 08:24 08:24 


 


WBC     (3.8-10.6)  k/uL


 


RBC     (4.30-5.90)  m/uL


 


Hgb     (13.0-17.5)  gm/dL


 


Hct     (39.0-53.0)  %


 


Neutrophils #     (1.3-7.7)  k/uL


 


APTT   41.4 H   (22.0-30.0)  sec


 


Sodium     (137-145)  mmol/L


 


BUN     (9-20)  mg/dL


 


Glucose     (74-99)  mg/dL


 


POC Glucose (mg/dL)  282 H    (75-99)  mg/dL


 


Calcium     (8.4-10.2)  mg/dL


 


Troponin I    7.700 H*  (0.000-0.034)  ng/mL


 


Triglycerides     (<150)  mg/dL


 


Cholesterol     (<200)  mg/dL


 


LDL Cholesterol, Calc     (0-99)  mg/dL


 


HDL Cholesterol     (40-60)  mg/dL








                                 Diabetes panel











  03/22/21 03/23/21 Range/Units





  11:29 02:37 


 


Sodium  137  134 L  (137-145)  mmol/L


 


Potassium  4.4  4.3  (3.5-5.1)  mmol/L


 


Chloride  102  105  ()  mmol/L


 


Carbon Dioxide  25  25  (22-30)  mmol/L


 


BUN  20  21 H  (9-20)  mg/dL


 


Creatinine  1.02  0.95  (0.66-1.25)  mg/dL


 


Glucose  317 H  278 H  (74-99)  mg/dL


 


Calcium  9.0  8.2 L  (8.4-10.2)  mg/dL


 


AST  43   (17-59)  U/L


 


ALT  17   (4-49)  U/L


 


Alkaline Phosphatase  94   ()  U/L


 


Total Protein  7.1   (6.3-8.2)  g/dL


 


Albumin  4.0   (3.5-5.0)  g/dL


 


Triglycerides   200 H  (<150)  mg/dL


 


HDL Cholesterol   37 L  (40-60)  mg/dL








                                  Calcium panel











  03/22/21 03/23/21 Range/Units





  11:29 02:37 


 


Calcium  9.0  8.2 L  (8.4-10.2)  mg/dL


 


Albumin  4.0   (3.5-5.0)  g/dL








                                 Pituitary panel











  03/22/21 03/23/21 Range/Units





  11:29 02:37 


 


Sodium  137  134 L  (137-145)  mmol/L


 


Potassium  4.4  4.3  (3.5-5.1)  mmol/L


 


Chloride  102  105  ()  mmol/L


 


Carbon Dioxide  25  25  (22-30)  mmol/L


 


BUN  20  21 H  (9-20)  mg/dL


 


Creatinine  1.02  0.95  (0.66-1.25)  mg/dL


 


Glucose  317 H  278 H  (74-99)  mg/dL


 


Calcium  9.0  8.2 L  (8.4-10.2)  mg/dL








                                  Adrenal panel











  03/22/21 03/23/21 Range/Units





  11:29 02:37 


 


Sodium  137  134 L  (137-145)  mmol/L


 


Potassium  4.4  4.3  (3.5-5.1)  mmol/L


 


Chloride  102  105  ()  mmol/L


 


Carbon Dioxide  25  25  (22-30)  mmol/L


 


BUN  20  21 H  (9-20)  mg/dL


 


Creatinine  1.02  0.95  (0.66-1.25)  mg/dL


 


Glucose  317 H  278 H  (74-99)  mg/dL


 


Calcium  9.0  8.2 L  (8.4-10.2)  mg/dL


 


Total Bilirubin  0.5   (0.2-1.3)  mg/dL


 


AST  43   (17-59)  U/L


 


ALT  17   (4-49)  U/L


 


Alkaline Phosphatase  94   ()  U/L


 


Total Protein  7.1   (6.3-8.2)  g/dL


 


Albumin  4.0   (3.5-5.0)  g/dL














- Imaging


Chest x-ray: report reviewed, image reviewed


EKG: image reviewed


Additional studies: 





Heart catheterization and echocardiogram films reviewed





Assessment and Plan


Assessment: 





1.  Coronary artery disease


2.  Mitral regurgitation


3.  Insulin-dependent diabetes, hemoglobin A1c pending


4.  Hyperlipidemia, cholesterol 241, triglyceride 200, 


5.  Family history of premature coronary artery disease, father and 2 brothers 

diagnosed with CAD with subsequent CABG in their early to mid 50s


6.  EtOH use most days without history of withdrawal


Plan: 





The patient was seen and examined at the bedside with mother present.  

Chart/diagnostics reviewed.  The usual perioperative course of coronary artery 

bypass surgery as well as mitral valve repair were discussed in detail with the 

patient and his mother, risks and benefits were reviewed, all questions were 

answered.  The patient is agreeable to preoperative testing which has been 

ordered.  The patient and his mother would prefer his usual cardiologist, Dr. Chavez, to be called and updated with assessment and plan of care.  We recommend

continuing aspirin, statin, beta blocker therapy.  The patient will need dental 

clearance, he does see a dentist on a regular basis and we will attempt to 

contact them.  We will complete 5 m walk test.  Risk factor modification 

discussed with the patient and his mother.  The patient will be seen by Dr. Elise.  More recommendations to follow once the testing has been completed and 

the patient has met with Dr. Elise   Management of other medical corpora 

morbidities per primary care service.





Thank you Dr. Luna for this consult.  


Time with Patient: Greater than 30

## 2021-03-23 NOTE — ECHOF
Referral Reason:nstemi



MEASUREMENTS

--------

HEIGHT: 167.6 cm

WEIGHT: 84.4 kg

BP: 

RVIDd:   2.6 cm     (< 3.3)

IVSd:   1.2 cm     (0.6 - 1.1)

LVIDd:   5.6 cm     (3.9 - 5.3)

LVPWd:   1.3 cm     (0.6 - 1.1)

IVSs:   1.7 cm

LVIDs:   4.4 cm

LVPWs:   1.7 cm

LA Diam:   3.6 cm     (2.7 - 3.8)

LAESV Index (A-L):   23.11 ml/m

Ao Diam:   3.6 cm     (2.0 - 3.7)

AV Cusp:   2.0 cm     (1.5 - 2.6)

MV EXCURSION:   20.477 mm     (> 18.000)

MV EF SLOPE:   165 mm/s     (70 - 150)

EPSS:   1.3 cm

MV E Carlos:   1.09 m/s

MV DecT:   231 ms

MV A Carlos:   0.52 m/s

MV E/A Ratio:   2.10 

RAP:   5.00 mmHg

RVSP:   31.41 mmHg







FINDINGS

--------

Sinus rhythm.

This was a technically good study.

The left ventricular size is normal.   There is mild concentric left ventricular hypertrophy.   Overa
ll left ventricular systolic function is mild-moderately impaired with, an EF between 40 - 45 %.There
 is generalized hypokinesiaseemed to be more pronounced in the inferobasal, lateral wall and apical s
eptal area.Fndings are consistent with ischemic cardiomyopathy

The right ventricle is normal in size.

Normal LA  size by volume 22+/-6 ml/m2.

The right atrium is normal in size.

Interatrial and interventricular septum intact.

Trace amount of aortic regurgitation.

Moderate mitral regurgitation is present.

Mild tricuspid regurgitation present.   There is mild pulmonary hypertension.   The right ventricular
 systolic pressure, as measured by Doppler, is 31.41mmHg.

Trace/mild (physiologic)  pulmonic regurgitation.

The aortic root size is normal.

Normal inferior vena cava with normal inspiratory collapse consistent with estimated right atrial pre
ssure of  5 mmHg.

There is no pericardial effusion.



CONCLUSIONS

--------

1. The left ventricular size is normal.

2. There is mild concentric left ventricular hypertrophy.

3. Overall left ventricular systolic function is mild-moderately impaired with, an EF between 40 - 45
 %.there appears to be dilated generalized hypokinesia, more pronounced in the inferobasal and latera
l and apical septal areas.  Findings are consistent with ischemic cardiomyopathy

4. Trace amount of aortic regurgitation.

5. Moderate mitral regurgitation is present.

6. Mild tricuspid regurgitation present.

7. There is mild pulmonary hypertension.

8. The right ventricular systolic pressure, as measured by Doppler, is 31.41mmHg.

9. Trace/mild (physiologic)  pulmonic regurgitation.

10. There is no pericardial effusion.





SONOGRAPHER: Emily Sorto RDCS

## 2021-03-23 NOTE — P.CRDCN
History of Present Illness


Chief complaint: cp


History of present illness: 





HISTORY OF PRESENTING ILLNESS


This is a pleasant 51-year-old  male past medical history significant 

for diabetes mellitus and daily alcohol intake. He follows in the office with 

Dr. Chavez in [place]. We have been asked to see in consultation for NSTEMI.  

He states for the previous 2 weeks he has been experiencing chest heaviness int

ermittently.  The symptoms occur typically at rest mostly at night when he lays 

down and tries to sleep.  When he lays down flat he feels a heaviness in his 

chest with some mild shortness of breath.  He denies any symptoms of chest 

discomfort or shortness of breath with exertion.  He has had some intermittent 

diarrhea.  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 

very physically active.  He had no symptoms of chest discomfort throughout the 

day.  The next morning he woke up and went out to breakfast and then had an 

episode of nausea and vomiting.  He was concerned with possible ovarian.  He 

came to the emergency department yesterday afternoon to be tested.  We have seen

and examined resting comfortably in bed in no acute distress.  He has no 

symptoms of chest discomfort or shortness of breath.





DIAGNOSTICS


EKG reveals sinus mechanism with inferior deep Twave inversions.


Chest xray possible developing right pneumonia.


Laboratory reviewed, WBC 10.7, hemoglobin 12.7, platelets 345, d-dimer 0.59, 

sodium 134, potassium 4.3, creatinine 0.95, magnesium 1.9, troponin 3.12, 6.08, 

7.84, 7.7, NT proBNP 3030,  and HDL 37.


He takes no daily cardiac medications. 





REVIEW OF SYSTEMS


At the time of my exam:


CONSTITUTIONAL: Denies fever or chills.


CARDIOVASCULAR: Denies chest pain, shortness of breath, orthopnea, PND or 

palpitations.


RESPIRATORY: Denies cough. 


GASTROINTESTINAL: Denies abdominal pain, diarrhea, constipation, nausea or 

vomiting.


MUSCULOSKELETAL: Denies myalgias.


NEUROLOGIC: Denies numbness, tingling, headacbe or weakness.


ENDOCRINE: Denies fatigue, weight change,  polydipsia or polyurina.


GENITOURINARY: Denies burning, hematuria or urgency with micturation.


HEMATOLOGIC: Denies history of anemia or bleeding. 





PHYSICAL EXAMINATION


Blood pressure 117/70 heart rate 91 afebrile and maintaining oxygen saturation 

on nasal cannula.


CONSTITUTIONAL: No apparent distress. 


HEENT: Head is normocephalic. Pupils are equal, round. Sclerae anicteric. Mucous

membranes of the mouth are moist.  No JVD. No carotid bruit.


CHEST EXAMINATION: Lungs are clear to auscultation. No chest wall tenderness is 

noted on palpation or with deep breathing. 


HEART EXAMINATION: Regular rate and rhythm. S1, S2 heard. Systolic ejection 

murmur at the base, no gallops or rub.


ABDOMEN: Soft, nontender. Positive bowel sounds.


EXTREMITIES: 2+ peripheral pulses, no lower extremity edema and no calf 

tenderness.


NEUROLOGIC EXAMINATION: Patient is awake, alert and oriented x3. 





ASSESSMENT


NSTEMI


Diabetes mellitus


Daily alcohol intake


Dyslipidemia


Valvular heart disease





PLAN


Obtain bedside 2D echocardiogram and doppler study to assess cardiac structure 

and function.


Decrease aspirin to 81 mg daily, increase Lipitor to 80 mg daily and continue 

Lopressor as previously ordered.


Recommend proceeding with cardiac catheterization.I have discussed the risks, 

benefits and alternative therapies for the above-mentioned procedure and for 

both sedation/analgesia as well as necessary blood product administration, if 

indicated, as they pertain to this patient.  The patient has indicated 

understanding and acceptance of the risks and procedures discussed.  Questions 

have been answered properly and he is agreeable to move forward with the above-

stated procedure.


Further recommendations to follow based upon clinical course.


Thank you kindly for this consultation.











Nurse Practitioner note has been reviewed, I agree with a documented findings 

and plan of care.  Patient was seen and examined.











Past Medical History


Past Medical History: Diabetes Mellitus


History of Any Multi-Drug Resistant Organisms: None Reported


Past Surgical History: No Surgical Hx Reported


Past Anesthesia/Blood Transfusion Reactions: No Reported Reaction


Past Psychological History: No Psychological Hx Reported


Smoking Status: Never smoker


Past Alcohol Use History: Daily


Past Drug Use History: None Reported





- Past Family History


  ** Father


Family Medical History: Myocardial Infarction (MI)


Additional Family Medical History / Comment(s): CABG





  ** Brother(s)


Family Medical History: Myocardial Infarction (MI)


Additional Family Medical History / Comment(s): CABG





Medications and Allergies


                                Home Medications











 Medication  Instructions  Recorded  Confirmed  Type


 


Insulin Aspart [NovoLOG Flexpen] See Protocol SQ AC-TID 03/22/21 03/22/21 

History


 


Insulin Glargine,Hum.rec.anlog 20 unit SQ DAILY 03/22/21 03/22/21 History





[Lantus Solostar]    








                                    Allergies











Allergy/AdvReac Type Severity Reaction Status Date / Time


 


No Known Allergies Allergy   Verified 03/22/21 13:06














Physical Exam


Vitals: 


                                   Vital Signs











  Temp Pulse Pulse Pulse Resp BP BP


 


 03/23/21 08:00  99.6 F   97  97  16   106/59


 


 03/23/21 04:00  98.9 F    95  18   105/56


 


 03/23/21 00:10  98.7 F    115 H  18   135/82


 


 03/22/21 21:10  100.3 F H    110 H  20   134/79


 


 03/22/21 20:00   116 H    18  138/73 


 


 03/22/21 17:37  98.2 F  105 H    18  139/96 


 


 03/22/21 15:50   97    18  138/93 


 


 03/22/21 12:47   105 H    18  137/92 


 


 03/22/21 10:32  98.9 F  107 H    20  128/83 














  Pulse Ox


 


 03/23/21 08:00 


 


 03/23/21 04:00  95


 


 03/23/21 00:10  96


 


 03/22/21 21:10  96


 


 03/22/21 20:00  91 L


 


 03/22/21 17:37  93 L


 


 03/22/21 15:50  97


 


 03/22/21 12:47  96


 


 03/22/21 10:32  98








                                Intake and Output











 03/22/21 03/23/21 03/23/21





 22:59 06:59 14:59


 


Intake Total 87.692 83.688 


 


Balance 87.692 83.688 


 


Intake:   


 


  Intake, IV Titration 87.692 83.688 





  Amount   


 


    Heparin Sod,Pork in 0.45% 87.692 83.688 





    NaCl 25,000 unit In 0.45   





    % NaCl 1 250ml.bag @ 12   





    UNITS/KG/HR 9.798 mls/hr   





    IV .Q24H [facility] Rx#:   





    [id number]   


 


Other:   


 


  Voiding Method Urinal  


 


  # Voids  1 


 


  Weight  84.5 kg 














Results





                                 03/23/21 02:37





                                 03/23/21 02:37


                                 Cardiac Enzymes











  03/22/21 03/22/21 03/22/21 Range/Units





  11:29 11:29 14:09 


 


AST  43    (17-59)  U/L


 


Troponin I   3.120 H*  6.080 H*  (0.000-0.034)  ng/mL














  03/22/21 Range/Units





  18:38 


 


AST   (17-59)  U/L


 


Troponin I  7.840 H*  (0.000-0.034)  ng/mL








                                   Coagulation











  03/22/21 03/22/21 03/23/21 Range/Units





  11:29 18:38 02:37 


 


PT  10.2    (9.0-12.0)  sec


 


APTT  23.1  30.1 H  41.2 H  (22.0-30.0)  sec








                                     Lipids











  03/23/21 Range/Units





  02:37 


 


Triglycerides  200 H  (<150)  mg/dL


 


Cholesterol  241 H  (<200)  mg/dL


 


HDL Cholesterol  37 L  (40-60)  mg/dL








                                       CBC











  03/22/21 03/23/21 Range/Units





  11:29 02:37 


 


WBC  7.6  10.7 H  (3.8-10.6)  k/uL


 


RBC  4.62  3.93 L  (4.30-5.90)  m/uL


 


Hgb  14.6  12.7 L  (13.0-17.5)  gm/dL


 


Hct  42.5  36.2 L  (39.0-53.0)  %


 


Plt Count  347  345  (150-450)  k/uL








                          Comprehensive Metabolic Panel











  03/22/21 03/23/21 Range/Units





  11:29 02:37 


 


Sodium  137  134 L  (137-145)  mmol/L


 


Potassium  4.4  4.3  (3.5-5.1)  mmol/L


 


Chloride  102  105  ()  mmol/L


 


Carbon Dioxide  25  25  (22-30)  mmol/L


 


BUN  20  21 H  (9-20)  mg/dL


 


Creatinine  1.02  0.95  (0.66-1.25)  mg/dL


 


Glucose  317 H  278 H  (74-99)  mg/dL


 


Calcium  9.0  8.2 L  (8.4-10.2)  mg/dL


 


AST  43   (17-59)  U/L


 


ALT  17   (4-49)  U/L


 


Alkaline Phosphatase  94   ()  U/L


 


Total Protein  7.1   (6.3-8.2)  g/dL


 


Albumin  4.0   (3.5-5.0)  g/dL








                               Current Medications











Generic Name Dose Route Start Last Admin





  Trade Name Freq  PRN Reason Stop Dose Admin


 


Acetaminophen  650 mg  03/22/21 13:04  03/22/21 22:31





  Acetaminophen Tab 325 Mg Tab  PO   650 mg





  Q6HR PRN   Administration





  Mild Pain or Fever > 100.5  


 


Hydrocodone Bitart/Acetaminophen  1 each  03/22/21 13:04 





  Hydrocodone/Apap 5-325mg 1 Each Tab  PO  





  Q4HR PRN  





  Moderate Pain  


 


Aspirin  81 mg  03/23/21 09:00 





  Aspirin 81 Mg  PO  





  DAILY [facility]  


 


Atorvastatin Calcium  80 mg  03/23/21 09:00 





  Atorvastatin 80 Mg Tab  PO  





  DAILY [facility]  


 


Heparin Sodium (Porcine)  0 unit  03/22/21 12:17  03/22/21 21:17





  Heparin Sodium,Porcine 5,000 Unit/Ml 1 Ml Vial  IV   4,000 unit





  PER PROTOCOL PRN   Administration





  Low PTT  





  Protocol  


 


Heparin Sodium/Sodium Chloride  250 mls @ 9.798 mls/hr  03/22/21 12:30  03/23/21

04:12





  25,000 unit/ Sodium Chloride  IV   17.45 units/kg/hr





  .Q24H [facility]   14.247 mls/hr





    Titration





  Protocol  





  12 UNITS/KG/HR  


 


Insulin Aspart  0 unit  03/22/21 17:30  03/23/21 06:41





  Insulin Aspart (Novolog) 100 Unit/Ml Vial  SQ   4 unit





  [facility]   Administration





  Protocol  


 


Insulin Detemir  20 unit  03/23/21 09:00 





  Insulin Detemir (Levemir) 100 Unit/Ml Syr  SQ  





  DAILY [facility]  


 


Melatonin  3 mg  03/22/21 13:04 





  Melatonin 3 Mg Tablet  PO  





  HS PRN  





  Insomnia  


 


Metoprolol Tartrate  25 mg  03/22/21 21:00  03/22/21 22:32





  Metoprolol Tartrate 25 Mg Tab  PO   25 mg





  BID DEANNA   Administration


 


Naloxone HCl  0.2 mg  03/22/21 13:04 





  Naloxone 0.4 Mg/Ml 1 Ml Vial  IV  





  Q2M PRN  





  Opioid Reversal  


 


Nitroglycerin  0.4 mg  03/22/21 12:39 





  Nitroglycerin Sl Tabs 0.4 Mg Tab  SUBLINGUAL  





  Q5M PRN  





  Chest Pain  








                                Intake and Output











 03/22/21 03/23/21 03/23/21





 22:59 06:59 14:59


 


Intake Total 87.692 83.688 


 


Balance 87.692 83.688 


 


Intake:   


 


  Intake, IV Titration 87.692 83.688 





  Amount   


 


    Heparin Sod,Pork in 0.45% 87.692 83.688 





    NaCl 25,000 unit In 0.45   





    % NaCl 1 250ml.bag @ 12   





    UNITS/KG/HR 9.798 mls/hr   





    IV .Q24H [facility] Rx#:   





    [id number]   


 


Other:   


 


  Voiding Method Urinal  


 


  # Voids  1 


 


  Weight  84.5 kg 








                                        





                                 03/23/21 02:37 





                                 03/23/21 02:37

## 2021-03-24 LAB
ANION GAP SERPL CALC-SCNC: 2 MMOL/L
BASOPHILS # BLD AUTO: 0 K/UL (ref 0–0.2)
BASOPHILS NFR BLD AUTO: 0 %
BUN SERPL-SCNC: 16 MG/DL (ref 9–20)
CALCIUM SPEC-MCNC: 7.7 MG/DL (ref 8.4–10.2)
CHLORIDE SERPL-SCNC: 108 MMOL/L (ref 98–107)
CO2 SERPL-SCNC: 24 MMOL/L (ref 22–30)
EOSINOPHIL # BLD AUTO: 0.1 K/UL (ref 0–0.7)
EOSINOPHIL NFR BLD AUTO: 2 %
ERYTHROCYTE [DISTWIDTH] IN BLOOD BY AUTOMATED COUNT: 3.51 M/UL (ref 4.3–5.9)
ERYTHROCYTE [DISTWIDTH] IN BLOOD: 12 % (ref 11.5–15.5)
GLUCOSE BLD-MCNC: 135 MG/DL (ref 75–99)
GLUCOSE BLD-MCNC: 142 MG/DL (ref 75–99)
GLUCOSE BLD-MCNC: 197 MG/DL (ref 75–99)
GLUCOSE BLD-MCNC: 224 MG/DL (ref 75–99)
GLUCOSE BLD-MCNC: 226 MG/DL (ref 75–99)
GLUCOSE SERPL-MCNC: 231 MG/DL (ref 74–99)
GLUCOSE UR QL: (no result)
HCT VFR BLD AUTO: 32.9 % (ref 39–53)
HGB BLD-MCNC: 11.4 GM/DL (ref 13–17.5)
KETONES UR QL STRIP.AUTO: (no result)
LYMPHOCYTES # SPEC AUTO: 0.8 K/UL (ref 1–4.8)
LYMPHOCYTES NFR SPEC AUTO: 16 %
MCH RBC QN AUTO: 32.5 PG (ref 25–35)
MCHC RBC AUTO-ENTMCNC: 34.7 G/DL (ref 31–37)
MCV RBC AUTO: 93.6 FL (ref 80–100)
MONOCYTES # BLD AUTO: 0.3 K/UL (ref 0–1)
MONOCYTES NFR BLD AUTO: 6 %
NEUTROPHILS # BLD AUTO: 3.7 K/UL (ref 1.3–7.7)
NEUTROPHILS NFR BLD AUTO: 76 %
PH UR: 7 [PH] (ref 5–8)
PLATELET # BLD AUTO: 257 K/UL (ref 150–450)
POTASSIUM SERPL-SCNC: 4.4 MMOL/L (ref 3.5–5.1)
SODIUM SERPL-SCNC: 134 MMOL/L (ref 137–145)
SP GR UR: 1.03 (ref 1–1.03)
UROBILINOGEN UR QL STRIP: 6 MG/DL (ref ?–2)
WBC # BLD AUTO: 4.9 K/UL (ref 3.8–10.6)

## 2021-03-24 PROCEDURE — B246ZZ4 ULTRASONOGRAPHY OF RIGHT AND LEFT HEART, TRANSESOPHAGEAL: ICD-10-PCS

## 2021-03-24 PROCEDURE — B54DZZZ ULTRASONOGRAPHY OF BILATERAL LOWER EXTREMITY VEINS: ICD-10-PCS

## 2021-03-24 RX ADMIN — METOPROLOL TARTRATE SCH MG: 25 TABLET, FILM COATED ORAL at 11:02

## 2021-03-24 RX ADMIN — HEPARIN SODIUM PRN UNIT: 5000 INJECTION, SOLUTION INTRAVENOUS; SUBCUTANEOUS at 00:24

## 2021-03-24 RX ADMIN — ATORVASTATIN CALCIUM SCH MG: 80 TABLET, FILM COATED ORAL at 11:02

## 2021-03-24 RX ADMIN — INSULIN ASPART SCH: 100 INJECTION, SOLUTION INTRAVENOUS; SUBCUTANEOUS at 17:35

## 2021-03-24 RX ADMIN — INSULIN ASPART SCH: 100 INJECTION, SOLUTION INTRAVENOUS; SUBCUTANEOUS at 10:12

## 2021-03-24 RX ADMIN — METOPROLOL TARTRATE SCH MG: 25 TABLET, FILM COATED ORAL at 20:33

## 2021-03-24 RX ADMIN — INSULIN DETEMIR SCH UNIT: 100 INJECTION, SOLUTION SUBCUTANEOUS at 11:16

## 2021-03-24 RX ADMIN — NITROGLYCERIN SCH INCH: 20 OINTMENT TOPICAL at 17:40

## 2021-03-24 RX ADMIN — NITROGLYCERIN SCH INCH: 20 OINTMENT TOPICAL at 00:24

## 2021-03-24 RX ADMIN — Medication SCH MG: at 17:39

## 2021-03-24 RX ADMIN — INSULIN ASPART SCH UNIT: 100 INJECTION, SOLUTION INTRAVENOUS; SUBCUTANEOUS at 17:39

## 2021-03-24 RX ADMIN — INSULIN ASPART SCH UNIT: 100 INJECTION, SOLUTION INTRAVENOUS; SUBCUTANEOUS at 12:47

## 2021-03-24 RX ADMIN — FOLIC ACID SCH MG: 1 TABLET ORAL at 17:39

## 2021-03-24 RX ADMIN — HEPARIN SODIUM PRN UNIT: 5000 INJECTION, SOLUTION INTRAVENOUS; SUBCUTANEOUS at 11:15

## 2021-03-24 RX ADMIN — ASPIRIN 81 MG CHEWABLE TABLET SCH MG: 81 TABLET CHEWABLE at 11:02

## 2021-03-24 RX ADMIN — NITROGLYCERIN SCH INCH: 20 OINTMENT TOPICAL at 12:47

## 2021-03-24 RX ADMIN — INSULIN ASPART SCH: 100 INJECTION, SOLUTION INTRAVENOUS; SUBCUTANEOUS at 20:30

## 2021-03-24 RX ADMIN — HEPARIN SODIUM SCH MLS/HR: 10000 INJECTION, SOLUTION INTRAVENOUS at 11:14

## 2021-03-24 RX ADMIN — NITROGLYCERIN SCH INCH: 20 OINTMENT TOPICAL at 05:55

## 2021-03-24 NOTE — P.PN
Subjective


Progress Note Date: 03/24/21





No new complaints.  Plan for CABG on Friday.





Objective





- Vital Signs


Vital signs: 


                                   Vital Signs











Temp  98.8 F   03/24/21 11:47


 


Pulse  88   03/24/21 11:47


 


Resp  18   03/24/21 11:47


 


BP  123/75   03/24/21 11:47


 


Pulse Ox  95   03/24/21 11:47








                                 Intake & Output











 03/23/21 03/24/21 03/24/21





 18:59 06:59 18:59


 


Intake Total 342 100.293 435.001


 


Output Total  400 


 


Balance 342 -299.707 435.001


 


Weight  86.2 kg 


 


Intake:   


 


    300


 


  Intake, IV Titration  100.293 135.001





  Amount   


 


    Heparin Sod,Pork in 0.45%  100.293 135.001





    NaCl 25,000 unit In 0.45   





    % NaCl 1 250ml.bag @ 11.   





    83 UNITS/KG/HR 9.996 mls/   





    hr IV .Q24H [facility] Rx#:   





    [id number]   


 


  Oral 240 0 


 


Output:   


 


  Urine  400 


 


Other:   


 


  Voiding Method Toilet Toilet Toilet


 


  # Voids 1 1 














- Exam





Gen: awake, alert


HEENT: normocephalic, atraumatic, good hearing acuity, moist mucous membranes


Resp: good air exchange, breathing comfortably with no accessory muscle use, 

clear to auscultation bilaterally without wheezes or crackles


CVS: good distal perfusion x 4, regular rate and rhythm without murmurs


GI: soft, NTTP, ND, appropriate bowel sounds


: no SPT, no CVAT, gonzalez catheter is not present


MSK: no pitting edema, no clubbing


Neuro: non-focal, moving all extremities


Psych: cooperative, euthymic mood





- Labs


CBC & Chem 7: 


                                 03/24/21 08:33





                                 03/24/21 08:33


Labs: 


                  Abnormal Lab Results - Last 24 Hours (Table)











  03/23/21 03/23/21 03/23/21 Range/Units





  16:59 20:12 21:52 


 


RBC     (4.30-5.90)  m/uL


 


Hgb     (13.0-17.5)  gm/dL


 


Hct     (39.0-53.0)  %


 


Lymphocytes #     (1.0-4.8)  k/uL


 


APTT    31.1 H  (22.0-30.0)  sec


 


Sodium     (137-145)  mmol/L


 


Chloride     ()  mmol/L


 


Glucose     (74-99)  mg/dL


 


POC Glucose (mg/dL)  292 H  153 H   (75-99)  mg/dL


 


Calcium     (8.4-10.2)  mg/dL


 


Urine Protein     (Negative)  


 


Urine Glucose (UA)     (Negative)  


 


Urine Ketones     (Negative)  














  03/24/21 03/24/21 03/24/21 Range/Units





  05:50 06:02 08:33 


 


RBC    3.51 L  (4.30-5.90)  m/uL


 


Hgb    11.4 L  (13.0-17.5)  gm/dL


 


Hct    32.9 L  (39.0-53.0)  %


 


Lymphocytes #    0.8 L  (1.0-4.8)  k/uL


 


APTT     (22.0-30.0)  sec


 


Sodium     (137-145)  mmol/L


 


Chloride     ()  mmol/L


 


Glucose     (74-99)  mg/dL


 


POC Glucose (mg/dL)   224 H   (75-99)  mg/dL


 


Calcium     (8.4-10.2)  mg/dL


 


Urine Protein  Trace H    (Negative)  


 


Urine Glucose (UA)  4+ H    (Negative)  


 


Urine Ketones  1+ H    (Negative)  














  03/24/21 03/24/21 03/24/21 Range/Units





  08:33 08:33 11:18 


 


RBC     (4.30-5.90)  m/uL


 


Hgb     (13.0-17.5)  gm/dL


 


Hct     (39.0-53.0)  %


 


Lymphocytes #     (1.0-4.8)  k/uL


 


APTT   32.6 H   (22.0-30.0)  sec


 


Sodium  134 L    (137-145)  mmol/L


 


Chloride  108 H    ()  mmol/L


 


Glucose  231 H    (74-99)  mg/dL


 


POC Glucose (mg/dL)    197 H  (75-99)  mg/dL


 


Calcium  7.7 L    (8.4-10.2)  mg/dL


 


Urine Protein     (Negative)  


 


Urine Glucose (UA)     (Negative)  


 


Urine Ketones     (Negative)  














  03/24/21 Range/Units





  11:58 


 


RBC   (4.30-5.90)  m/uL


 


Hgb   (13.0-17.5)  gm/dL


 


Hct   (39.0-53.0)  %


 


Lymphocytes #   (1.0-4.8)  k/uL


 


APTT   (22.0-30.0)  sec


 


Sodium   (137-145)  mmol/L


 


Chloride   ()  mmol/L


 


Glucose   (74-99)  mg/dL


 


POC Glucose (mg/dL)  226 H  (75-99)  mg/dL


 


Calcium   (8.4-10.2)  mg/dL


 


Urine Protein   (Negative)  


 


Urine Glucose (UA)   (Negative)  


 


Urine Ketones   (Negative)  








                      Microbiology - Last 24 Hours (Table)











 03/24/21 05:50 Nasal Screen MRSA/MSSA - Preliminary





 Nasal Swab 














Assessment and Plan


Assessment: 





NSTEMI


-Troponin 3.120. Troponins trended upward with initial troponin is 3.120  with 

repeats of 6.080 and 7.840.


-EKG showing sinus tachycardia at 105 bpm with T-wave inversion in leads III and

aVF, no signs of ST elevation.  No previous EKGs available for comparison.


-Continue heparin infusion, pharmacy to dose.


-Cardiology following, patient scheduled to have cardiac cath done this morning.


-Telemetry monitoring.


-Trend troponins every 3 hours 2.


-Close monitoring of I's and O's.


-Heart healthy carb consistent diet, NPO at midnight.


-Patient being placed on daily aspirin, atorvastatin, and metoprolol.


-Lipid profile revealed elevation of triglycerides 200, cholesterol 241, LDL 

164, HDL of 37.  Atorvastatin increased to 80 mg nightly.


-Hemoglobin A1c = 8.2%





Insulin-dependent diabetes mellitus type II


-Glycemic protocol with NovoLog sliding scale and continuation of Lantus 20 

units daily.


-Heart healthy carb consistent diet








The patient is admitted with an anticipated greater than 2 midnight stay for ev

aluation of NSTEMI.


Surrogate decision-maker: Self


CODE STATUS: Full code


DVT prophylaxis: Heparin


Discussed with: Patient


Anticipated discharge date: Clinical course to determine


Anticipated discharge place: Home

## 2021-03-24 NOTE — P.PN
Subjective





HISTORY OF PRESENTING ILLNESS


This is a pleasant 51-year-old  male past medical history significant 

for diabetes mellitus and daily alcohol intake. He follows in the office with 

Dr. Chavez in [place]. We have been asked to see in consultation for NSTEMI.  

He states for the previous 2 weeks he has been experiencing chest heaviness 

intermittently.  The symptoms occur typically at rest mostly at night when he 

lays down and tries to sleep.  When he lays down flat he feels a heaviness in 

his chest with some mild shortness of breath.  He denies any symptoms of chest 

discomfort or shortness of breath with exertion.  He has had some intermittent 

diarrhea.  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 

very physically active.  He had no symptoms of chest discomfort throughout the 

day.  The next morning he woke up and went out to breakfast and then had an 

episode of nausea and vomiting.  He was concerned with possible ovarian.  He 

came to the emergency department yesterday afternoon to be tested.  We have seen

and examined resting comfortably in bed in no acute distress.  He has no 

symptoms of chest discomfort or shortness of breath.





3/24/2021


Pt seen and examined sitting up in the recliner in no acute distress. He 

underwent CHETAN today revealing structurally normal mitral valve, mild posterior 

leaflet tethering from the inferior hypokinesia, moderate-severe central MR. 

with EF 40-45%. CT angio neck reveals reduction of right ICA 50-60%, left ICA 

greater than 90%. Laboratory data reviewed, sodium 134, potassium 4.4, 

creatinine 0.88 and TSH 4.47. Blood pressure 123/75, heart rate 88 and afebrile.

He denies any symptoms of chest pain or heaviness, shortness of breath, 

dizziness or palpitations. 





PHYSICAL EXAMINATION


CONSTITUTIONAL: No apparent distress. 


HEENT: Head is normocephalic. Pupils are equal, round. Sclerae anicteric. Mucous

membranes of the mouth are moist.  No JVD. No carotid bruit.


CHEST EXAMINATION: Lungs are clear to auscultation. No chest wall tenderness is 

noted on palpation or with deep breathing. 


HEART EXAMINATION: Regular rate and rhythm. S1, S2 heard. Systolic ejection mu

rmur at the base, no gallops or rub.


EXTREMITIES: 2+ peripheral pulses, no lower extremity edema and no calf 

tenderness. 





ASSESSMENT


NSTEMI


Diabetes mellitus


Daily alcohol intake


Dyslipidemia


Valvular heart disease





PLAN


CT surgery continuing risk stratification and pre-op work-up. 


Continue heparin infusion, beta blockers, aspirin and atorvastatin as previously

ordered. 


We will initiate an ACE/ARB post-operatively.


Further recommendations to follow based on clinical course. 





Nurse Practitioner note has been reviewed, I agree with a documented findings 

and plan of care.  Patient was seen and examined.





Objective





- Vital Signs


Vital signs: 


                                   Vital Signs











Temp  98.8 F   03/24/21 11:47


 


Pulse  88   03/24/21 11:47


 


Resp  18   03/24/21 11:47


 


BP  123/75   03/24/21 11:47


 


Pulse Ox  95   03/24/21 11:47








                                 Intake & Output











 03/23/21 03/24/21 03/24/21





 18:59 06:59 18:59


 


Intake Total 342 100.293 435.001


 


Output Total  400 


 


Balance 342 -299.707 435.001


 


Weight  86.2 kg 


 


Intake:   


 


    300


 


  Intake, IV Titration  100.293 135.001





  Amount   


 


    Heparin Sod,Pork in 0.45%  100.293 135.001





    NaCl 25,000 unit In 0.45   





    % NaCl 1 250ml.bag @ 11.   





    83 UNITS/KG/HR 9.996 mls/   





    hr IV .Q24H [facility] Rx#:   





    [id number]   


 


  Oral 240 0 


 


Output:   


 


  Urine  400 


 


Other:   


 


  Voiding Method Toilet Toilet Toilet


 


  # Voids 1 1 














- Labs


CBC & Chem 7: 


                                 03/24/21 08:33





                                 03/24/21 08:33


Labs: 


                  Abnormal Lab Results - Last 24 Hours (Table)











  03/23/21 03/23/21 03/23/21 Range/Units





  16:59 20:12 21:52 


 


RBC     (4.30-5.90)  m/uL


 


Hgb     (13.0-17.5)  gm/dL


 


Hct     (39.0-53.0)  %


 


Lymphocytes #     (1.0-4.8)  k/uL


 


APTT    31.1 H  (22.0-30.0)  sec


 


Sodium     (137-145)  mmol/L


 


Chloride     ()  mmol/L


 


Glucose     (74-99)  mg/dL


 


POC Glucose (mg/dL)  292 H  153 H   (75-99)  mg/dL


 


Calcium     (8.4-10.2)  mg/dL


 


Urine Protein     (Negative)  


 


Urine Glucose (UA)     (Negative)  


 


Urine Ketones     (Negative)  














  03/24/21 03/24/21 03/24/21 Range/Units





  05:50 06:02 08:33 


 


RBC    3.51 L  (4.30-5.90)  m/uL


 


Hgb    11.4 L  (13.0-17.5)  gm/dL


 


Hct    32.9 L  (39.0-53.0)  %


 


Lymphocytes #    0.8 L  (1.0-4.8)  k/uL


 


APTT     (22.0-30.0)  sec


 


Sodium     (137-145)  mmol/L


 


Chloride     ()  mmol/L


 


Glucose     (74-99)  mg/dL


 


POC Glucose (mg/dL)   224 H   (75-99)  mg/dL


 


Calcium     (8.4-10.2)  mg/dL


 


Urine Protein  Trace H    (Negative)  


 


Urine Glucose (UA)  4+ H    (Negative)  


 


Urine Ketones  1+ H    (Negative)  














  03/24/21 03/24/21 03/24/21 Range/Units





  08:33 08:33 11:18 


 


RBC     (4.30-5.90)  m/uL


 


Hgb     (13.0-17.5)  gm/dL


 


Hct     (39.0-53.0)  %


 


Lymphocytes #     (1.0-4.8)  k/uL


 


APTT   32.6 H   (22.0-30.0)  sec


 


Sodium  134 L    (137-145)  mmol/L


 


Chloride  108 H    ()  mmol/L


 


Glucose  231 H    (74-99)  mg/dL


 


POC Glucose (mg/dL)    197 H  (75-99)  mg/dL


 


Calcium  7.7 L    (8.4-10.2)  mg/dL


 


Urine Protein     (Negative)  


 


Urine Glucose (UA)     (Negative)  


 


Urine Ketones     (Negative)  














  03/24/21 Range/Units





  11:58 


 


RBC   (4.30-5.90)  m/uL


 


Hgb   (13.0-17.5)  gm/dL


 


Hct   (39.0-53.0)  %


 


Lymphocytes #   (1.0-4.8)  k/uL


 


APTT   (22.0-30.0)  sec


 


Sodium   (137-145)  mmol/L


 


Chloride   ()  mmol/L


 


Glucose   (74-99)  mg/dL


 


POC Glucose (mg/dL)  226 H  (75-99)  mg/dL


 


Calcium   (8.4-10.2)  mg/dL


 


Urine Protein   (Negative)  


 


Urine Glucose (UA)   (Negative)  


 


Urine Ketones   (Negative)  








                      Microbiology - Last 24 Hours (Table)











 03/24/21 05:50 Nasal Screen MRSA/MSSA - Preliminary





 Nasal Swab

## 2021-03-24 NOTE — P.CNPUL
History of Present Illness


Consult date: 03/24/21


Reason for consult: chest pain


Chief complaint: Chest pain


History of present illness: 


 This is a 51-year-old white male patient with past medical history of diabetes 

mellitus, insulin dependent, and strong family history of premature coronary 

artery disease in his father and his brother who had coronary artery bypass g

rafting surgeries in their early to mid 50s.  Patient presented to the emergency

department on 03/22/2021 symptoms of chest heaviness, general malaise, feeling 

very fatigued, chilled, run down.  Patient had been feeling unwell for the last 

couple of weeks.  He has been having chest pressure particularly at nighttime.  

Patient follows with a cardiologist in [place].  His EKG showed a sinus mechani

sm with inferior deep T-wave inversions, he had troponin elevation with his 

third troponin peaking at 7.840.  Patient underwent cardiac evaluation, and had 

a heart catheterization on 03/23/2021 which showed ostial LAD lesion of 85%, mid

LAD of 60-70%, apical LAD of 50%, diagonal branch 1 of 80%, diagonal 2 was 90%, 

circumflex of 50%, RCA stenosis of %.  He had mildly elevated left-sided 

filling pressures with LVEDP of 17.  Echocardiogram showed mild concentric LVH, 

mild to moderately impaired LV function with EF of 40-45%, there was generalized

hypokinesis in the inferobasal, lateral and apical septal wall areas.  There was

mild pulmonary hypertension with right-sided pressure of 31.4 mmHg.  ProBNP was 

elevated at 3030, her chest x-ray showed mild right perihilar infiltrate.  No 

leukocytosis, hemoglobin on admission was 14.6, d-dimer was negative at 0.59 

lites and renal profile were unremarkable, was checked for COVID 19 and the PCR 

was negative, urinalysis showed 4+ glucose and 1+ ketones but no signs of 

infection.  Review of multivessel coronary artery disease CT surgery was 

consulted us ability of bypass surgery, patient underwent CHETAN today and showed 

no significant aortic valve insufficiency, mitral valve was structurally normal,

with mild amount of posterior leaflet tethering, and there was moderate to 

severe predominantly central secondary mitral regurgitation.  Tricuspid valve 

appear to be normal.  There was no evidence of PFO.  Left atrial appendage was 

free of clot.  We are consulted for pulmonary/critical care management in the 

postoperative period, preop FEV1 was completed, and was in the order of 58% of 

predicted, and patient denies any chronic lung disease, and he is a lifetime 

nonsmoker.  Patient is undergoing some additional preop testing, he was also 

found to have 70% stenosis of the proximal internal carotid artery on the left 

on the Doppler ultrasound of the carotid arteries.  CT neck was completed today 

showing right ICA 50-60%, and left ICA of greater than 90%.  During the time of 

our evaluation patient is sitting up in the recliner, he is on room air, 

breathing comfortably denies any chest pain, or difficulty breathing, or socks 

is 96%, he is afebrile, hemodynamically stable.








Review of Systems


All systems: negative


Constitutional: Denies chills, Denies fever


Eyes: denies blurred vision, denies pain


Ears, nose, mouth and throat: Denies headache, Denies sore throat


Cardiovascular: Reports chest pain, Denies shortness of breath


Respiratory: Reports dyspnea, Denies cough


Gastrointestinal: Denies abdominal pain, Denies diarrhea, Denies nausea, Denies 

vomiting


Musculoskeletal: Denies myalgias


Integumentary: Denies pruritus, Denies rash


Neurological: Denies numbness, Denies weakness


Psychiatric: Denies anxiety, Denies depression


Endocrine: Denies fatigue, Denies weight change





Past Medical History


Past Medical History: Diabetes Mellitus


History of Any Multi-Drug Resistant Organisms: None Reported


Past Surgical History: Tonsillectomy


Past Anesthesia/Blood Transfusion Reactions: No Reported Reaction


Past Psychological History: No Psychological Hx Reported


Smoking Status: Never smoker


Past Alcohol Use History: Daily


Additional Past Alcohol Use History / Comment(s): Drinks 3-4 drinks most days; 

has never experienced withdrawal


Past Drug Use History: None Reported





- Past Family History


  ** Father


Family Medical History: Coronary Artery Disease (CAD), Myocardial Infarction 

(MI)


Additional Family Medical History / Comment(s): CABG





  ** Brother(s)


Family Medical History: Coronary Artery Disease (CAD), Myocardial Infarction 

(MI)


Additional Family Medical History / Comment(s): CABG





  ** Mother


Family Medical History: Coronary Artery Disease (CAD)





Medications and Allergies


                                Home Medications











 Medication  Instructions  Recorded  Confirmed  Type


 


Insulin Aspart [NovoLOG Flexpen] See Protocol SQ AC-TID 03/22/21 03/22/21 

History


 


Insulin Glargine,Hum.rec.anlog 20 unit SQ DAILY 03/22/21 03/22/21 History





[Lantus Wily]    








                                    Allergies











Allergy/AdvReac Type Severity Reaction Status Date / Time


 


No Known Allergies Allergy   Verified 03/22/21 13:06














Physical Exam


Vitals: 


                                   Vital Signs











  Temp Pulse Pulse Resp BP Pulse Ox


 


 03/24/21 08:45  98.8 F  91   22  120/75 


 


 03/24/21 08:24  98.7 F   88  18  115/61  96


 


 03/24/21 06:00  99.0 F   89  20  128/75  93 L


 


 03/24/21 04:00  98.2 F   87  16  126/79  95


 


 03/24/21 00:00  98.6 F   86  16  121/74  96


 


 03/23/21 20:00  98.4 F   94  16  122/73  93 L


 


 03/23/21 16:00    63  20  124/73  95


 


 03/23/21 14:07    94  20  113/70  97


 


 03/23/21 13:34     20  


 


 03/23/21 13:07    97  20  121/73  98


 


 03/23/21 12:39       97


 


 03/23/21 12:07    92  20  123/70  96


 


 03/23/21 11:37    92  20  123/70  96








                                Intake and Output











 03/23/21 03/24/21 03/24/21





 22:59 06:59 14:59


 


Intake Total 240 100.293 435.001


 


Output Total  400 


 


Balance 240 -299.707 435.001


 


Intake:   


 


  IV   300


 


  Intake, IV Titration  100.293 135.001





  Amount   


 


    Heparin Sod,Pork in 0.45%  100.293 135.001





    NaCl 25,000 unit In 0.45   





    % NaCl 1 250ml.bag @ 11.   





    83 UNITS/KG/HR 9.996 mls/   





    hr IV .Q24H [facility] Rx#:   





    [id number]   


 


  Oral 240 0 


 


Output:   


 


  Urine  400 


 


Other:   


 


  Voiding Method Toilet Toilet 


 


  # Voids 1 1 


 


  Weight  86.2 kg 











 GENERAL EXAM: Alert, very pleasant 51-year-old white male, on room air, with a 

pulse ox of 95%, sitting up in the recliner, breathing comfortably, comfortable 

in no apparent distress.


HEAD: Normocephalic/atraumatic.


EYES: Normal reaction of pupils, equal size.  Conjunctiva pink, sclera white.


NOSE: Clear with pink turbinates.


THROAT: No erythema or exudates.


NECK: No masses, no JVD, no thyroid enlargement, no adenopathy.


CHEST: No chest wall deformity.  Symmetrical expansion. 


LUNGS: Equal air entry with no crackles, wheeze, rhonchi or dullness.


CVS: Regular rate and rhythm, normal S1 and S2, no gallops, no murmurs, no rubs


ABDOMEN: Soft, nontender.  No hepatosplenomegaly, normal bowel sounds, no 

guarding or rigidity.


EXTREMITIES: No clubbing, no edema, no cyanosis, 2+ pulses and upper and lower 

extremities.


MUSCULOSKELETAL: Muscle strength and tone normal.


SPINE: No scoliosis or deformity


SKIN: No rashes


CENTRAL NERVOUS SYSTEM: Alert and oriented -3.  No focal deficits, tone is 

normal in all 4 extremities.


PSYCHIATRIC: Alert and oriented -3.  Appropriate affect.  Intact judgment and 

insight.











Results





- Laboratory Findings


CBC and BMP: 


                                 03/24/21 08:33





                                 03/24/21 08:33


PT/INR, D-dimer











PT  10.2 sec (9.0-12.0)   03/22/21  11:29    


 


INR  0.9  (<1.2)   03/22/21  11:29    


 


D-Dimer  0.59 mg/L FEU (<0.60)   03/22/21  11:29    








Abnormal lab findings: 


                                  Abnormal Labs











  03/22/21 03/22/21 03/22/21





  11:29 11:29 14:09


 


WBC   


 


RBC   


 


Hgb   


 


Hct   


 


Neutrophils #   


 


Lymphocytes #   


 


APTT   


 


Sodium   


 


Chloride   


 


BUN   


 


Glucose  317 H  


 


POC Glucose (mg/dL)   


 


Hemoglobin A1c   


 


Calcium   


 


Troponin I   3.120 H*  6.080 H*


 


Triglycerides   


 


Cholesterol   


 


LDL Cholesterol, Calc   


 


HDL Cholesterol   


 


Urine Protein   


 


Urine Glucose (UA)   


 


Urine Ketones   














  03/22/21 03/22/21 03/22/21





  18:38 18:38 22:26


 


WBC   


 


RBC   


 


Hgb   


 


Hct   


 


Neutrophils #   


 


Lymphocytes #   


 


APTT  30.1 H  


 


Sodium   


 


Chloride   


 


BUN   


 


Glucose   


 


POC Glucose (mg/dL)    302 H


 


Hemoglobin A1c   


 


Calcium   


 


Troponin I   7.840 H* 


 


Triglycerides   


 


Cholesterol   


 


LDL Cholesterol, Calc   


 


HDL Cholesterol   


 


Urine Protein   


 


Urine Glucose (UA)   


 


Urine Ketones   














  03/23/21 03/23/21 03/23/21





  02:37 02:37 02:37


 


WBC    10.7 H


 


RBC    3.93 L


 


Hgb    12.7 L


 


Hct    36.2 L


 


Neutrophils #    8.9 H


 


Lymphocytes #   


 


APTT   


 


Sodium   134 L 


 


Chloride   


 


BUN   21 H 


 


Glucose   278 H 


 


POC Glucose (mg/dL)   


 


Hemoglobin A1c  8.2 H  


 


Calcium   8.2 L 


 


Troponin I   


 


Triglycerides   200 H 


 


Cholesterol   241 H 


 


LDL Cholesterol, Calc   164 H 


 


HDL Cholesterol   37 L 


 


Urine Protein   


 


Urine Glucose (UA)   


 


Urine Ketones   














  03/23/21 03/23/21 03/23/21





  02:37 06:35 08:24


 


WBC   


 


RBC   


 


Hgb   


 


Hct   


 


Neutrophils #   


 


Lymphocytes #   


 


APTT  41.2 H   41.4 H


 


Sodium   


 


Chloride   


 


BUN   


 


Glucose   


 


POC Glucose (mg/dL)   282 H 


 


Hemoglobin A1c   


 


Calcium   


 


Troponin I   


 


Triglycerides   


 


Cholesterol   


 


LDL Cholesterol, Calc   


 


HDL Cholesterol   


 


Urine Protein   


 


Urine Glucose (UA)   


 


Urine Ketones   














  03/23/21 03/23/21 03/23/21





  08:24 12:22 16:59


 


WBC   


 


RBC   


 


Hgb   


 


Hct   


 


Neutrophils #   


 


Lymphocytes #   


 


APTT   


 


Sodium   


 


Chloride   


 


BUN   


 


Glucose   


 


POC Glucose (mg/dL)   204 H  292 H


 


Hemoglobin A1c   


 


Calcium   


 


Troponin I  7.700 H*  


 


Triglycerides   


 


Cholesterol   


 


LDL Cholesterol, Calc   


 


HDL Cholesterol   


 


Urine Protein   


 


Urine Glucose (UA)   


 


Urine Ketones   














  03/23/21 03/23/21 03/24/21





  20:12 21:52 05:50


 


WBC   


 


RBC   


 


Hgb   


 


Hct   


 


Neutrophils #   


 


Lymphocytes #   


 


APTT   31.1 H 


 


Sodium   


 


Chloride   


 


BUN   


 


Glucose   


 


POC Glucose (mg/dL)  153 H  


 


Hemoglobin A1c   


 


Calcium   


 


Troponin I   


 


Triglycerides   


 


Cholesterol   


 


LDL Cholesterol, Calc   


 


HDL Cholesterol   


 


Urine Protein    Trace H


 


Urine Glucose (UA)    4+ H


 


Urine Ketones    1+ H














  03/24/21 03/24/21 03/24/21





  06:02 08:33 08:33


 


WBC   


 


RBC   3.51 L 


 


Hgb   11.4 L 


 


Hct   32.9 L 


 


Neutrophils #   


 


Lymphocytes #   0.8 L 


 


APTT   


 


Sodium    134 L


 


Chloride    108 H


 


BUN   


 


Glucose    231 H


 


POC Glucose (mg/dL)  224 H  


 


Hemoglobin A1c   


 


Calcium    7.7 L


 


Troponin I   


 


Triglycerides   


 


Cholesterol   


 


LDL Cholesterol, Calc   


 


HDL Cholesterol   


 


Urine Protein   


 


Urine Glucose (UA)   


 


Urine Ketones   














  03/24/21 03/24/21





  08:33 11:18


 


WBC  


 


RBC  


 


Hgb  


 


Hct  


 


Neutrophils #  


 


Lymphocytes #  


 


APTT  32.6 H 


 


Sodium  


 


Chloride  


 


BUN  


 


Glucose  


 


POC Glucose (mg/dL)   197 H


 


Hemoglobin A1c  


 


Calcium  


 


Troponin I  


 


Triglycerides  


 


Cholesterol  


 


LDL Cholesterol, Calc  


 


HDL Cholesterol  


 


Urine Protein  


 


Urine Glucose (UA)  


 


Urine Ketones  














- Diagnostic Findings


Chest x-ray: report reviewed, image reviewed


Additional studies: 


 EKG, echocardiogram, cardiac cath report, CHETAN, carotid Doppler, CTA neck 

results reviewed








Assessment and Plan


Plan: 


 Assessment:





#1.  Acute non-ST elevated myocardial infarction





#2.  Multivessel coronary artery disease, awaiting surgery





#3.  Moderate to severe mitral regurgitation of 3+, being evaluated for surgery





#4.  Diabetes mellitus type 2, insulin dependent





#5.  Lifetime nonsmoker, preop FEV1  58% possibly related to restrictive alveo

lar pattern, related to interstitial edema





#6.  Acute exacerbation of CHF with systolic dysfunction, and mild to moderate 

impairment of left ventricular systolic function EF of 40-45%





#7.  Acute ischemic cardiomyopathy EF of 40-45%





#8.  Left internal carotid stenosis of greater than 70%





#9.  Regular EtOH intake of 3-4 alcoholic drinks on a daily basis





#10.  Hyperlipidemia





Plan:





Continue encouraging deep breathing and coughing, incentive spirometry use, 

chest x-ray has been reviewed, all diagnostics have been reviewed, reported FEV1

was 58% of predicted, did not physically see the PFT, likely on the basis of 

interstitial edema.  Patient is undergoing additional testing in view of carotid

stenosis, we'll continue to follow closely with CT surgery. 





I performed a history & physical examination of the patient and discussed their 

management with my nurse practitioner, Heena Reyes.  I reviewed the nurse 

practitioner's note and agree with the documented findings and plan of care.  

Lung sounds are positive for the next breath sounds.  The findings and the 

impression was discussed with the patient.  I attest to the documentation by the

nurse practitioner. 





Time with Patient: Greater than 30

## 2021-03-24 NOTE — P.VSCSTY
Greater Saphenous Vein Mapping





This is bilateral lower extremity greater saphenous vein mapping.





Date of service: 03/23/2021





Vein quality and ultrasound appearance: We see no intraluminal thrombus or wall 

changes Rouleau effect noted bilaterally.  Note that there are significant 

branchings in both calves.





Vein size  groin right : 5.9 x 5.1                                          

groin left: 5.8 x 6.1





                High thigh right: 6.5 x 5.2                                 high

thigh left:  6.2 x 4.8





                Mid thigh right:  5.8 x 4.5                                  mid

thigh left: 5.7 x 5.3





                 Above-knee right:  6.1 x 5.3 5.                             

above-knee left: 5.2 x 4.3





                  Below knee right: 6.4 x 6.0                                  

below-knee left: 5.3 x 4.7





                   Mid calf right: 4.2 x 3.2                                    

   mid calf left: 4.8 x 3.8





                    Ankle right: 4.7 x 3.6                                      

    ankle left: 4.8 x 3.6





Impression: Usable bilateral greater saphenous vein..

## 2021-03-24 NOTE — CT
EXAMINATION TYPE: CT angio neck

 

DATE OF EXAM: 3/24/2021

 

COMPARISON:

 

HISTORY: carotid stenosis

 

CT DLP: 587.1 mGycm

 

CONTRAST: 

CTA cervical carotids is performed and with IV Contrast, patient injected with 65 mL of Isovue 370.

 

Contrast CTA of the cervical carotids was performed 3-D reconstruction imaging obtained at a separate
 workstation.  

 

Right carotid system: Mild plaque is seen of the right common carotid artery.  There is mild plaque a
lso noted at the carotid bulb. There is luminal narrowing noted at the origin of the right ICA estima
trish at 50-60%. ECA is patent.  Right vertebral artery appears unremarkable. 

 

Left carotid system: Mild plaque is seen of the left common carotid artery.  There is severe soft yessi
que also noted at the carotid bulb. There is high-grade stenosis involving the origin of the left ICA
 with luminal narrowing at greater than 90%. ECA is patent. Left vertebral artery appears unremarkabl
e. 

 

Bilateral pleural effusions are incidentally noted.

 

IMPRESSION: 

1.  Estimated diameter reduction Right ICA 50-60% 

2.  Estimated diameter reduction Left ICA greater than 90%

## 2021-03-24 NOTE — P.TEE
Description of Procedure(s): 


Procedure performed: Transesophageal Echocardiogram with color flow doppler, 

pulsed wave doppler and continuous wave doppler





Sedation:  Patient was given sedation by anesthesia, see anesthesia note for 

full details. 





Complications: none





Indications: Moderate mitral regurgitation, coronary artery disease with CABG 

evaluation





History: Patient is a pleasant 51-year-old male with history of hypertension, 

hyperlipidemia, diabetes mellitus type 2 insulin-dependent, and strong family 

history of coronary artery disease who presents with symptoms of not feeling 

well for 3 weeks and some GI symptoms with worsened chest pressure over last few

days.  Patient was found to have non-STEMI and therefore heart catheterization 

was performed which showed multivessel disease and patient undergoing CABG 

workup.  TTE showed EF 40-45% and moderate mitral regurgitation and therefore 

CHETAN was recommended.





PROCEDURE: After the risks, benefits and alternatives of the above mentioned 

procedure was explained in detail with the patient, informed consent was 

obtained.  Patient was brought to the lab in a fasting state.  Patient was given

sedation by anesthesia, see anesthesia note for full details.  The throat was 

sprayed with Hurricane to anesthetize the throat.  A lubricated Omni probe was 

then introduced into the esophagus and stomach and multiple views were obtained.

 2D echo with color flow doppler, pulsed wave doppler and continuous wave 

doppler was utilized.  Agitated saline bubbles were injected to assess for any 

intra-atrial shunt.  The probe was then removed.  Patient tolerated the 

procedure well.  Patient was transferred to the post procedure area in stable 

and satisfactory condition.





   


                FINDINGS: 


   1.  The aortic valve is tricuspid and function normally.  No significant 

aortic insufficiency.


   2.  The mitral valve appears be structurally normal.  There is mild amount of

posterior leaflet tethering from inferior hypokinesis.  There is moderate to 

severe predominantly central secondary mitral regurgitation with an additional 

smaller second posteriorly directed regurgitant jet related to posterior leaflet

tethering.  Vena contracta of 0.6cm, PISA estimated EROA of 0.39cm2.  Systolic 

blunting of 3/4 pulmonary veins without any systolic flow reversal noted. 


   3.  Tricuspid valve appears to be normal.


   4.  The interatrial septum is intact.  No evidence of PFO.


   5.  Left atrial appendage is free of clot.


   6.  Left ventricular size appears normal.  There is basal inferior and 

anteroseptal hypokinesis with an EF 40-45%.





               SUMMARY: 


   1.  Moderate to severe central mitral regurgitation.  No evidence of flail or

prolapse and mitral regurgitation related to secondary mitral regurgitation from

cardiomyopathy and mild posterior leaflet tethering. 


   2.  Cardiomyopathy with EF 40-45%.

## 2021-03-24 NOTE — P.PN
Subjective


Progress Note Date: 03/24/21


Principal diagnosis: 





Coronary artery disease, mitral regurgitation.  Previous medical history of 

insulin-dependent diabetes, family history of premature coronary artery disease,

EtOH use most days without history of withdrawal, and newly diagnosed 

hyperlipidemia and left carotid stenosis





Patient currently laying in bed in no acute distress.  Denies any chest pain or 

shortness of breath.  He is scheduled for CHETAN as well as CTA of the neck today. 

Planning for surgery continues.  All questions answered.  No other new concerns.





Objective





- Vital Signs


Vital signs: 


                                   Vital Signs











Temp  99.0 F   03/24/21 06:00


 


Pulse  89   03/24/21 06:00


 


Resp  20   03/24/21 06:00


 


BP  128/75   03/24/21 06:00


 


Pulse Ox  93 L  03/24/21 06:00








                                 Intake & Output











 03/23/21 03/24/21 03/24/21





 18:59 06:59 18:59


 


Intake Total 342 100.293 


 


Output Total  400 


 


Balance 342 -299.707 


 


Weight  86.2 kg 


 


Intake:   


 


    


 


  Intake, IV Titration  100.293 





  Amount   


 


    Heparin Sod,Pork in 0.45%  100.293 





    NaCl 25,000 unit In 0.45   





    % NaCl 1 250ml.bag @ 11.   





    83 UNITS/KG/HR 9.996 mls/   





    hr IV .Q24H [facility] Rx#:   





    [id number]   


 


  Oral 240 0 


 


Output:   


 


  Urine  400 


 


Other:   


 


  Voiding Method Toilet Toilet 


 


  # Voids 1 1 














- Exam





CONSTITUTIONAL: Appears comfortable, cooperative, no acute distress


RESPIRATORY:  Lungs sounds diminished bilaterally.  Respirations even, 

nonlabored.  Currently on room air with oxygen saturation 95%.  Able to achieve 

2500 mL on incentive spirometry.  Strong cough.  


CARDIOVASCULAR:  S1, S2 present.  Regular rate and rhythm, sinus rhythm on 

telemetry.  Sternum stable.   Palpable peripheral pulses bilaterally.  No edema 

present.  No calf pain or tenderness noted.  


GASTROINTESTINAL:  Abdomen soft, nontender, nondistended.  Active bowel sounds 

present 4 quadrants.  Currently nothing by mouth for CHETAN


GENITOURINARY:  Continues to void clear, yellow urine.  


INTEGUMENTARY:  Skin is warm and dry with evidence of good perfusion.  


NEUROLOGIC:  Cranial nerves II through XII intact


MUSKULOSKELETAL:  Able to move all extremities, strength equal bilaterally, gait

normal


PSYCHIATRIC:  Alert and oriented to person place and time, appropriate affect, 

intact judgment and insight








- Labs


CBC & Chem 7: 


                                 03/23/21 02:37





                                 03/23/21 02:37


Labs: 


                  Abnormal Lab Results - Last 24 Hours (Table)











  03/23/21 03/23/21 03/23/21 Range/Units





  02:37 08:24 08:24 


 


APTT   41.4 H   (22.0-30.0)  sec


 


POC Glucose (mg/dL)     (75-99)  mg/dL


 


Hemoglobin A1c  8.2 H    (4.0-6.0)  %


 


Troponin I    7.700 H*  (0.000-0.034)  ng/mL


 


Urine Protein     (Negative)  


 


Urine Glucose (UA)     (Negative)  


 


Urine Ketones     (Negative)  














  03/23/21 03/23/21 03/23/21 Range/Units





  12:22 16:59 20:12 


 


APTT     (22.0-30.0)  sec


 


POC Glucose (mg/dL)  204 H  292 H  153 H  (75-99)  mg/dL


 


Hemoglobin A1c     (4.0-6.0)  %


 


Troponin I     (0.000-0.034)  ng/mL


 


Urine Protein     (Negative)  


 


Urine Glucose (UA)     (Negative)  


 


Urine Ketones     (Negative)  














  03/23/21 03/24/21 03/24/21 Range/Units





  21:52 05:50 06:02 


 


APTT  31.1 H    (22.0-30.0)  sec


 


POC Glucose (mg/dL)    224 H  (75-99)  mg/dL


 


Hemoglobin A1c     (4.0-6.0)  %


 


Troponin I     (0.000-0.034)  ng/mL


 


Urine Protein   Trace H   (Negative)  


 


Urine Glucose (UA)   4+ H   (Negative)  


 


Urine Ketones   1+ H   (Negative)  














Assessment and Plan


Assessment: 





1.  Coronary artery disease


2.  Mitral regurgitation


3.  Insulin-dependent diabetes, hemoglobin A1c 8.2%


4.  Hyperlipidemia, cholesterol 241, triglyceride 200, 


5.  Left internal carotid artery stenosis, greater than 70% per carotid Doppler


6.  Moderate lung disease, FEV1 58% of predicted, lifelong nonsmoker


7.  Family history of premature coronary artery disease, father and 2 brothers 

diagnosed with CAD with subsequent CABG in their early to mid 50s


8.  EtOH use most days without history of withdrawal


Plan: 





1.  Continue aspirin, statin, beta blocker therapy


2.  Encourage incentive spirometry use


3.  Increase activity, ambulate as tolerated


4.  Continue preoperative teaching


5.  Attempted to contact patient's dentist yesterday, will attempt again today


6.  Await CTA of neck to determine treatment plans for left carotid stenosis


7.  Await CHETAN for definitive treatment of mitral valve


8.  Dr. Moore consulted for pulmonology management


9.  Medical management of other comorbidities per primary care service


10.  More recommendations to follow once testing completed, timing of surgery to

be determined 


Time with Patient: Greater than 30

## 2021-03-25 LAB
ANION GAP SERPL CALC-SCNC: 3 MMOL/L
BASOPHILS # BLD AUTO: 0 K/UL (ref 0–0.2)
BASOPHILS NFR BLD AUTO: 0 %
BUN SERPL-SCNC: 14 MG/DL (ref 9–20)
CALCIUM SPEC-MCNC: 8.4 MG/DL (ref 8.4–10.2)
CHLORIDE SERPL-SCNC: 109 MMOL/L (ref 98–107)
CO2 SERPL-SCNC: 25 MMOL/L (ref 22–30)
EOSINOPHIL # BLD AUTO: 0.1 K/UL (ref 0–0.7)
EOSINOPHIL NFR BLD AUTO: 2 %
ERYTHROCYTE [DISTWIDTH] IN BLOOD BY AUTOMATED COUNT: 3.7 M/UL (ref 4.3–5.9)
ERYTHROCYTE [DISTWIDTH] IN BLOOD: 12.1 % (ref 11.5–15.5)
GLUCOSE BLD-MCNC: 102 MG/DL (ref 75–99)
GLUCOSE BLD-MCNC: 140 MG/DL (ref 75–99)
GLUCOSE BLD-MCNC: 142 MG/DL (ref 75–99)
GLUCOSE BLD-MCNC: 276 MG/DL (ref 75–99)
GLUCOSE SERPL-MCNC: 176 MG/DL (ref 74–99)
HCT VFR BLD AUTO: 34 % (ref 39–53)
HGB BLD-MCNC: 11.7 GM/DL (ref 13–17.5)
LYMPHOCYTES # SPEC AUTO: 0.7 K/UL (ref 1–4.8)
LYMPHOCYTES NFR SPEC AUTO: 19 %
MCH RBC QN AUTO: 31.6 PG (ref 25–35)
MCHC RBC AUTO-ENTMCNC: 34.4 G/DL (ref 31–37)
MCV RBC AUTO: 91.9 FL (ref 80–100)
MONOCYTES # BLD AUTO: 0.2 K/UL (ref 0–1)
MONOCYTES NFR BLD AUTO: 6 %
NEUTROPHILS # BLD AUTO: 2.8 K/UL (ref 1.3–7.7)
NEUTROPHILS NFR BLD AUTO: 72 %
PLATELET # BLD AUTO: 266 K/UL (ref 150–450)
POTASSIUM SERPL-SCNC: 4.1 MMOL/L (ref 3.5–5.1)
SODIUM SERPL-SCNC: 137 MMOL/L (ref 137–145)
WBC # BLD AUTO: 3.9 K/UL (ref 3.8–10.6)

## 2021-03-25 RX ADMIN — INSULIN ASPART SCH UNIT: 100 INJECTION, SOLUTION INTRAVENOUS; SUBCUTANEOUS at 21:41

## 2021-03-25 RX ADMIN — FOLIC ACID SCH MG: 1 TABLET ORAL at 08:14

## 2021-03-25 RX ADMIN — INSULIN ASPART SCH UNIT: 100 INJECTION, SOLUTION INTRAVENOUS; SUBCUTANEOUS at 12:47

## 2021-03-25 RX ADMIN — INSULIN ASPART SCH: 100 INJECTION, SOLUTION INTRAVENOUS; SUBCUTANEOUS at 16:31

## 2021-03-25 RX ADMIN — NITROGLYCERIN SCH: 20 OINTMENT TOPICAL at 04:53

## 2021-03-25 RX ADMIN — INSULIN ASPART SCH UNIT: 100 INJECTION, SOLUTION INTRAVENOUS; SUBCUTANEOUS at 06:47

## 2021-03-25 RX ADMIN — INSULIN ASPART SCH UNIT: 100 INJECTION, SOLUTION INTRAVENOUS; SUBCUTANEOUS at 17:12

## 2021-03-25 RX ADMIN — INSULIN DETEMIR SCH UNIT: 100 INJECTION, SOLUTION SUBCUTANEOUS at 08:14

## 2021-03-25 RX ADMIN — Medication SCH MG: at 08:14

## 2021-03-25 RX ADMIN — NITROGLYCERIN SCH INCH: 20 OINTMENT TOPICAL at 21:41

## 2021-03-25 RX ADMIN — ATORVASTATIN CALCIUM SCH MG: 80 TABLET, FILM COATED ORAL at 08:14

## 2021-03-25 RX ADMIN — HEPARIN SODIUM SCH MLS/HR: 10000 INJECTION, SOLUTION INTRAVENOUS at 17:14

## 2021-03-25 RX ADMIN — NITROGLYCERIN SCH INCH: 20 OINTMENT TOPICAL at 17:13

## 2021-03-25 RX ADMIN — NITROGLYCERIN SCH INCH: 20 OINTMENT TOPICAL at 12:46

## 2021-03-25 RX ADMIN — NITROGLYCERIN SCH: 20 OINTMENT TOPICAL at 02:33

## 2021-03-25 RX ADMIN — ASPIRIN 81 MG CHEWABLE TABLET SCH MG: 81 TABLET CHEWABLE at 08:13

## 2021-03-25 RX ADMIN — MUPIROCIN SCH APPLIC: 20 OINTMENT TOPICAL at 21:41

## 2021-03-25 RX ADMIN — INSULIN ASPART SCH UNIT: 100 INJECTION, SOLUTION INTRAVENOUS; SUBCUTANEOUS at 12:46

## 2021-03-25 RX ADMIN — MUPIROCIN SCH APPLIC: 20 OINTMENT TOPICAL at 12:48

## 2021-03-25 RX ADMIN — METOPROLOL TARTRATE SCH MG: 25 TABLET, FILM COATED ORAL at 20:23

## 2021-03-25 RX ADMIN — METOPROLOL TARTRATE SCH MG: 25 TABLET, FILM COATED ORAL at 08:13

## 2021-03-25 NOTE — P.PN
Awake Subjective





HISTORY OF PRESENTING ILLNESS


This is a pleasant 51-year-old  male past medical history significant 

for diabetes mellitus and daily alcohol intake. He follows in the office with 

Dr. Chavez in [place]. We have been asked to see in consultation for NSTEMI.  

He states for the previous 2 weeks he has been experiencing chest heaviness 

intermittently.  The symptoms occur typically at rest mostly at night when he 

lays down and tries to sleep.  When he lays down flat he feels a heaviness in 

his chest with some mild shortness of breath.  He denies any symptoms of chest 

discomfort or shortness of breath with exertion.  He has had some intermittent 

diarrhea.  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 

very physically active.  He had no symptoms of chest discomfort throughout the 

day.  The next morning he woke up and went out to breakfast and then had an 

episode of nausea and vomiting.  He was concerned with possible ovarian.  He 

came to the emergency department yesterday afternoon to be tested.  We have seen

and examined resting comfortably in bed in no acute distress.  He has no 

symptoms of chest discomfort or shortness of breath.





3/24/2021


Pt seen and examined sitting up in the recliner in no acute distress. He is 

scheduled to undergo bypass grafting and mitral valve repair tomorrow. He has no

chest pain, shortness of breath, dizziness or palpitations. Blood pressure 

121/73 heart rate 84. Labs reviewed. He will undergo carotid intervention after 

surgery per CT surgeons. 





PHYSICAL EXAMINATION


CONSTITUTIONAL: No apparent distress. 


HEENT: Head is normocephalic. Pupils are equal, round. Sclerae anicteric. Mucous

membranes of the mouth are moist.  No JVD. No carotid bruit.


CHEST EXAMINATION: Lungs are clear to auscultation. No chest wall tenderness is 

noted on palpation or with deep breathing. 


HEART EXAMINATION: Regular rate and rhythm. S1, S2 heard. Systolic ejection 

murmur at the base, no gallops or rub.


EXTREMITIES: 2+ peripheral pulses, no lower extremity edema and no calf 

tenderness. 





ASSESSMENT


NSTEMI


Diabetes mellitus


Daily alcohol intake


Dyslipidemia


Valvular heart disease





PLAN


Bypass grafting scheduled for tomorrow. 


Continue heparin infusion, beta blockers, aspirin and atorvastatin as previously

ordered. 


We will initiate an ACE/ARB post-operatively.


Further recommendations to follow based on clinical course. 





Nurse Practitioner note has been reviewed, I agree with a documented findings 

and plan of care.  Patient was seen and examined.





Objective





- Vital Signs


Vital signs: 


                                   Vital Signs











Temp  98.6 F   03/25/21 03:20


 


Pulse  84   03/25/21 08:00


 


Resp  16   03/25/21 08:00


 


BP  121/73   03/25/21 08:00


 


Pulse Ox  96   03/25/21 08:00








                                 Intake & Output











 03/24/21 03/25/21 03/25/21





 18:59 06:59 18:59


 


Intake Total 893.001 480 490


 


Balance 893.001 480 490


 


Weight  86.5 kg 


 


Intake:   


 


    


 


  Intake, IV Titration 135.001  250





  Amount   


 


    Heparin Sod,Pork in 0.45% 135.001  250





    NaCl 25,000 unit In 0.45   





    % NaCl 1 250ml.bag @ 11.   





    83 UNITS/KG/HR 9.996 mls/   





    hr IV .Q24H [facility] Rx#:   





    [id number]   


 


  Oral 458 480 240


 


Other:   


 


  Voiding Method Toilet Toilet Toilet


 


  # Voids 2 3 














- Labs


CBC & Chem 7: 


                                 03/25/21 08:28





                                 03/25/21 08:28


Labs: 


                  Abnormal Lab Results - Last 24 Hours (Table)











  03/24/21 03/24/21 03/24/21 Range/Units





  17:06 17:56 20:24 


 


RBC     (4.30-5.90)  m/uL


 


Hgb     (13.0-17.5)  gm/dL


 


Hct     (39.0-53.0)  %


 


Lymphocytes #     (1.0-4.8)  k/uL


 


APTT   47.3 H   (22.0-30.0)  sec


 


Chloride     ()  mmol/L


 


Glucose     (74-99)  mg/dL


 


POC Glucose (mg/dL)  142 H   135 H  (75-99)  mg/dL














  03/25/21 03/25/21 03/25/21 Range/Units





  06:00 08:28 08:28 


 


RBC    3.70 L  (4.30-5.90)  m/uL


 


Hgb    11.7 L  (13.0-17.5)  gm/dL


 


Hct    34.0 L  (39.0-53.0)  %


 


Lymphocytes #    0.7 L  (1.0-4.8)  k/uL


 


APTT     (22.0-30.0)  sec


 


Chloride   109 H   ()  mmol/L


 


Glucose   176 H   (74-99)  mg/dL


 


POC Glucose (mg/dL)  276 H    (75-99)  mg/dL














  03/25/21 03/25/21 Range/Units





  08:28 11:59 


 


RBC    (4.30-5.90)  m/uL


 


Hgb    (13.0-17.5)  gm/dL


 


Hct    (39.0-53.0)  %


 


Lymphocytes #    (1.0-4.8)  k/uL


 


APTT  42.2 H   (22.0-30.0)  sec


 


Chloride    ()  mmol/L


 


Glucose    (74-99)  mg/dL


 


POC Glucose (mg/dL)   142 H  (75-99)  mg/dL








                      Microbiology - Last 24 Hours (Table)











 03/24/21 05:50 Nasal Screen MRSA/MSSA - Preliminary





 Nasal Swab

## 2021-03-25 NOTE — P.PN
Subjective


Progress Note Date: 03/25/21





No new complaints, sugars are running high





Objective





- Vital Signs


Vital signs: 


                                   Vital Signs











Temp  98.6 F   03/25/21 03:20


 


Pulse  84   03/25/21 08:00


 


Resp  16   03/25/21 08:00


 


BP  121/73   03/25/21 08:00


 


Pulse Ox  96   03/25/21 08:00








                                 Intake & Output











 03/24/21 03/25/21 03/25/21





 18:59 06:59 18:59


 


Intake Total 893.001 480 490


 


Balance 893.001 480 490


 


Weight  86.5 kg 


 


Intake:   


 


    


 


  Intake, IV Titration 135.001  250





  Amount   


 


    Heparin Sod,Pork in 0.45% 135.001  250





    NaCl 25,000 unit In 0.45   





    % NaCl 1 250ml.bag @ 11.   





    83 UNITS/KG/HR 9.996 mls/   





    hr IV .Q24H [facility] Rx#:   





    [id number]   


 


  Oral 458 480 240


 


Other:   


 


  Voiding Method Toilet Toilet Toilet


 


  # Voids 2 3 














- Exam





Gen: awake, alert


HEENT: normocephalic, atraumatic, good hearing acuity, moist mucous membranes


Resp: good air exchange, breathing comfortably with no accessory muscle use, 

clear to auscultation bilaterally without wheezes or crackles


CVS: good distal perfusion x 4, regular rate and rhythm without murmurs


GI: soft, NTTP, ND, appropriate bowel sounds


: no SPT, no CVAT, gonzalez catheter is not present


MSK: no pitting edema, no clubbing


Neuro: non-focal, moving all extremities


Psych: cooperative, euthymic mood 





- Labs


CBC & Chem 7: 


                                 03/25/21 08:28





                                 03/25/21 08:28


Labs: 


                  Abnormal Lab Results - Last 24 Hours (Table)











  03/24/21 03/24/21 03/24/21 Range/Units





  11:58 17:06 17:56 


 


RBC     (4.30-5.90)  m/uL


 


Hgb     (13.0-17.5)  gm/dL


 


Hct     (39.0-53.0)  %


 


Lymphocytes #     (1.0-4.8)  k/uL


 


APTT    47.3 H  (22.0-30.0)  sec


 


Chloride     ()  mmol/L


 


Glucose     (74-99)  mg/dL


 


POC Glucose (mg/dL)  226 H  142 H   (75-99)  mg/dL














  03/24/21 03/25/21 03/25/21 Range/Units





  20:24 06:00 08:28 


 


RBC     (4.30-5.90)  m/uL


 


Hgb     (13.0-17.5)  gm/dL


 


Hct     (39.0-53.0)  %


 


Lymphocytes #     (1.0-4.8)  k/uL


 


APTT     (22.0-30.0)  sec


 


Chloride    109 H  ()  mmol/L


 


Glucose    176 H  (74-99)  mg/dL


 


POC Glucose (mg/dL)  135 H  276 H   (75-99)  mg/dL














  03/25/21 03/25/21 Range/Units





  08:28 08:28 


 


RBC  3.70 L   (4.30-5.90)  m/uL


 


Hgb  11.7 L   (13.0-17.5)  gm/dL


 


Hct  34.0 L   (39.0-53.0)  %


 


Lymphocytes #  0.7 L   (1.0-4.8)  k/uL


 


APTT   42.2 H  (22.0-30.0)  sec


 


Chloride    ()  mmol/L


 


Glucose    (74-99)  mg/dL


 


POC Glucose (mg/dL)    (75-99)  mg/dL








                      Microbiology - Last 24 Hours (Table)











 03/24/21 05:50 Nasal Screen MRSA/MSSA - Preliminary





 Nasal Swab 














Assessment and Plan


Assessment: 





NSTEMI


-Troponin 3.120. Troponins trended upward with initial troponin is 3.120  with 

repeats of 6.080 and 7.840.


-EKG showing sinus tachycardia at 105 bpm with T-wave inversion in leads III and

aVF, no signs of ST elevation.  No previous EKGs available for comparison.


-Continue heparin infusion, pharmacy to dose.


-Cardiology following, patient scheduled to have cardiac cath = multivessel CAD;


   - CT surgery consulted, with plan for CABG on 3/25


-Telemetry monitoring.


-Trend troponins every 3 hours 2.


-Close monitoring of I's and O's.


-Heart healthy carb consistent diet, NPO at midnight.


-Patient being placed on daily aspirin, atorvastatin, and metoprolol.


-Lipid profile revealed elevation of triglycerides 200, cholesterol 241, LDL 

164, HDL of 37.  Atorvastatin increased to 80 mg nightly.


-Hemoglobin A1c = 8.2%





Insulin-dependent diabetes mellitus type II


-Glycemic protocol with NovoLog sliding scale and continuation of Lantus 20 

units daily.


-Heart healthy carb consistent diet








The patient is admitted with an anticipated greater than 2 midnight stay for ev

aluation of NSTEMI.


Surrogate decision-maker: Self


CODE STATUS: Full code


DVT prophylaxis: Heparin


Discussed with: Patient


Anticipated discharge date: Clinical course to determine


Anticipated discharge place: Home

## 2021-03-25 NOTE — XR
EXAMINATION TYPE: XR chest 2V

 

DATE OF EXAM: 3/25/2021

 

COMPARISON: 3/22/2021

 

HISTORY: Shortness of breath

 

TECHNIQUE:  Frontal and lateral views of the chest are obtained.

 

FINDINGS:

 

Scattered senescent parenchymal changes noted. Hyperinflation compatible with COPD. 

 

Patchy density right medial lung base may reflect underlying infiltrate. Correlate clinically.

 

Heart size is stable.

 

Mediastinal structures are stable and grossly unremarkable.

 

No evidence for hilar prominence.

 

Degenerative changes dorsal spine. 

 

IMPRESSION:

1. Patchy density right medial lung base may reflect underlying infiltrate. Correlate clinically.

## 2021-03-25 NOTE — P.PN
Subjective


Progress Note Date: 03/25/21


Principal diagnosis: 





Coronary artery disease, mitral regurgitation.  Previous medical history of 

insulin-dependent diabetes, family history of premature coronary artery disease,

EtOH use most days without history of withdrawal, and newly diagnosed 

hyperlipidemia and left carotid stenosis





Patient currently sitting up in bed in no acute distress.  Denies any chest pain

or shortness of breath, has been ambulatory without difficulty.  CHETAN and neck 

CTA reviewed yesterday with Dr. Saleem.  Dr. Saleem did meet with the patient 

and his wife and discussed surgery in great detail, he also contacted the 

patient's primary cardiologist Dr. Sourav marie and all are in agreement for bypass

and mitral valve repair tomorrow, left carotid artery stenosis to be tackled in 

the future.  All questions answered.  No other new concerns.





Objective





- Vital Signs


Vital signs: 


                                   Vital Signs











Temp  98.6 F   03/25/21 03:20


 


Pulse  84   03/25/21 08:00


 


Resp  16   03/25/21 08:00


 


BP  121/73   03/25/21 08:00


 


Pulse Ox  96   03/25/21 08:00








                                 Intake & Output











 03/24/21 03/25/21 03/25/21





 18:59 06:59 18:59


 


Intake Total 893.001 480 490


 


Balance 893.001 480 490


 


Weight  86.5 kg 


 


Intake:   


 


    


 


  Intake, IV Titration 135.001  250





  Amount   


 


    Heparin Sod,Pork in 0.45% 135.001  250





    NaCl 25,000 unit In 0.45   





    % NaCl 1 250ml.bag @ 11.   





    83 UNITS/KG/HR 9.996 mls/   





    hr IV .Q24H [facility] Rx#:   





    [id number]   


 


  Oral 458 480 240


 


Other:   


 


  Voiding Method Toilet Toilet 


 


  # Voids 2 3 














- Exam





CONSTITUTIONAL: Appears comfortable, cooperative, no acute distress


RESPIRATORY:  Lungs sounds diminished bilaterally.  Respirations even, no

nlabored.  Currently on room air with oxygen saturation 95%.  Able to achieve 

5300-9633 mL on incentive spirometry.  Strong cough.  


CARDIOVASCULAR:  S1, S2 present.  Regular rate and rhythm, sinus rhythm on 

telemetry.  Sternum stable.   Palpable peripheral pulses bilaterally.  No edema 

present.  No calf pain or tenderness noted.  


GASTROINTESTINAL:  Abdomen soft, nontender, nondistended.  Active bowel sounds 

present 4 quadrants.  Tolerating diet.  Positive bowel movement yesterday per 

patient


GENITOURINARY:  Continues to void clear, yellow urine.  


INTEGUMENTARY:  Skin is warm and dry with evidence of good perfusion.  


NEUROLOGIC:  Cranial nerves II through XII intact


MUSKULOSKELETAL:  Able to move all extremities, strength equal bilaterally, gait

normal


PSYCHIATRIC:  Alert and oriented to person place and time, appropriate affect, 

intact judgment and insight








- Labs


CBC & Chem 7: 


                                 03/25/21 08:28





                                 03/25/21 08:28


Labs: 


                  Abnormal Lab Results - Last 24 Hours (Table)











  03/24/21 03/24/21 03/24/21 Range/Units





  11:18 11:58 17:06 


 


RBC     (4.30-5.90)  m/uL


 


Hgb     (13.0-17.5)  gm/dL


 


Hct     (39.0-53.0)  %


 


Lymphocytes #     (1.0-4.8)  k/uL


 


APTT     (22.0-30.0)  sec


 


Chloride     ()  mmol/L


 


Glucose     (74-99)  mg/dL


 


POC Glucose (mg/dL)  197 H  226 H  142 H  (75-99)  mg/dL














  03/24/21 03/24/21 03/25/21 Range/Units





  17:56 20:24 06:00 


 


RBC     (4.30-5.90)  m/uL


 


Hgb     (13.0-17.5)  gm/dL


 


Hct     (39.0-53.0)  %


 


Lymphocytes #     (1.0-4.8)  k/uL


 


APTT  47.3 H    (22.0-30.0)  sec


 


Chloride     ()  mmol/L


 


Glucose     (74-99)  mg/dL


 


POC Glucose (mg/dL)   135 H  276 H  (75-99)  mg/dL














  03/25/21 03/25/21 03/25/21 Range/Units





  08:28 08:28 08:28 


 


RBC   3.70 L   (4.30-5.90)  m/uL


 


Hgb   11.7 L   (13.0-17.5)  gm/dL


 


Hct   34.0 L   (39.0-53.0)  %


 


Lymphocytes #   0.7 L   (1.0-4.8)  k/uL


 


APTT    42.2 H  (22.0-30.0)  sec


 


Chloride  109 H    ()  mmol/L


 


Glucose  176 H    (74-99)  mg/dL


 


POC Glucose (mg/dL)     (75-99)  mg/dL








                      Microbiology - Last 24 Hours (Table)











 03/24/21 05:50 Nasal Screen MRSA/MSSA - Preliminary





 Nasal Swab 














Assessment and Plan


Assessment: 





1.  Triple-vessel coronary artery disease, non-STEMI this admission


2.  Moderate to severe predominantly centrally directed mitral regurgitation


3.  Insulin-dependent diabetes, hemoglobin A1c 8.2%


4.  Hyperlipidemia, cholesterol 241, triglyceride 200, 


5.  Left internal carotid artery stenosis, greater than 70% per carotid Doppler,

greater than 90% per neck CTA


6.  Lifelong nonsmoker with FEV1 58% of predicted 


7.  Family history of premature coronary artery disease, father and 2 brothers d

iagnosed with CAD with subsequent CABG in their early to mid 50s


8.  EtOH use most days without history of withdrawal


Plan: 





1.  Continue aspirin, statin, beta blocker therapy


2.  Encourage incentive spirometry use


3.  Increase activity, ambulate as tolerated


4.  Continue preoperative teaching


5.  Our plan is for myocardial revascularization, mitral valve repair possible 

replacement, left internal mammary artery, left radial artery, endoscopic vein 

harvest with intraoperative transesophageal echocardiogram and exclusion of the 

left atrial appendage tomorrow, 03/26/2021 by Dr. Saleem


6.  NPO after midnight


7.  Left carotid stenosis intervention to be completed in the future.  In the 

meantime our plan is to avoid hypotension


8.  Dr. Moore consulted for pulmonology management


9.  Medical management of other comorbidities per primary care service


10.  More recommendations to follow 


Time with Patient: Greater than 30

## 2021-03-25 NOTE — P.PN
Subjective


Progress Note Date: 03/25/21


This is a 51-year-old white male patient with past medical history of diabetes 

mellitus, insulin dependent, and strong family history of premature coronary 

artery disease in his father and his brother who had coronary artery bypass 

grafting surgeries in their early to mid 50s.  Patient presented to the [facility] department on 03/22/2021 symptoms of chest heaviness, general malaise, 

feeling very fatigued, chilled, run down.  Patient had been feeling unwell for 

the last couple of weeks.  He has been having chest pressure particularly at 

nighttime.  Patient follows with a cardiologist in [place].  His EKG showed a 

sinus mechanism with inferior deep T-wave inversions, he had troponin elevation 

with his third troponin peaking at 7.840.  Patient underwent cardiac evaluation,

and had a heart catheterization on 03/23/2021 which showed ostial LAD lesion of 

85%, mid LAD of 60-70%, apical LAD of 50%, diagonal branch 1 of 80%, diagonal 2 

was 90%, circumflex of 50%, RCA stenosis of %.  He had mildly elevated 

left-sided filling pressures with LVEDP of 17.  Echocardiogram showed mild 

concentric LVH, mild to moderately impaired LV function with EF of 40-45%, there

was generalized hypokinesis in the inferobasal, lateral and apical septal wall 

areas.  There was mild pulmonary hypertension with right-sided pressure of 31.4 

mmHg.  ProBNP was elevated at 3030, her chest x-ray showed mild right perihilar 

infiltrate.  No leukocytosis, hemoglobin on admission was 14.6, d-dimer was 

negative at 0.59 lites and renal profile were unremarkable, was checked for 

COVID 19 and the PCR was negative, urinalysis showed 4+ glucose and 1+ ketones 

but no signs of infection.  Review of multivessel coronary artery disease CT 

surgery was consulted us ability of bypass surgery, patient underwent CHETAN today 

and showed no significant aortic valve insufficiency, mitral valve was 

structurally normal, with mild amount of posterior leaflet tethering, and there 

was moderate to severe predominantly central secondary mitral regurgitation.  

Tricuspid valve appear to be normal.  There was no evidence of PFO.  Left atrial

appendage was free of clot.  We are consulted for pulmonary/critical care 

management in the postoperative period, preop FEV1 was completed, and was in the

order of 58% of predicted, and patient denies any chronic lung disease, and he 

is a lifetime nonsmoker.  Patient is undergoing some additional preop testing, 

he was also found to have 70% stenosis of the proximal internal carotid artery 

on the left on the Doppler ultrasound of the carotid arteries.  CT neck was 

completed today showing right ICA 50-60%, and left ICA of greater than 90%.  

During the time of our evaluation patient is sitting up in the recliner, he is 

on room air, breathing comfortably denies any chest pain, or difficulty 

breathing, or socks is 96%, he is afebrile, hemodynamically stable.





03/25/2021 patient seen in follow-up on selective care unit, his increased up in

the recliner, in no acute distress, currently on room air, and his pulse ox is 

96% on room air, he is breathing comfortable, lung sounds reveal some bibasilar 

crackles, no repeat chest x-ray today.  Patient has been maintaining stable O2 

saturations on room air, he's been ambulating in the room, tolerating it well.  

He is working on incentive spirometer, his creatinine FEV1 showed FEV1 of 58%.  

He was found to have carotid artery stenosis, the left ICA of 90%, and the right

ICA 50-60%.  In the plan is for left carotid endarterectomy at a later date.  No

complaints of chest pain, no shortness of breath, patient is on heparin 

infusion, no acute events overnight.








Objective





- Vital Signs


Vital signs: 


                                   Vital Signs











Temp  98.6 F   03/25/21 03:20


 


Pulse  84   03/25/21 08:00


 


Resp  16   03/25/21 08:00


 


BP  121/73   03/25/21 08:00


 


Pulse Ox  96   03/25/21 08:00








                                 Intake & Output











 03/24/21 03/25/21 03/25/21





 18:59 06:59 18:59


 


Intake Total 893.001 480 490


 


Balance 893.001 480 490


 


Weight  86.5 kg 


 


Intake:   


 


    


 


  Intake, IV Titration 135.001  250





  Amount   


 


    Heparin Sod,Pork in 0.45% 135.001  250





    NaCl 25,000 unit In 0.45   





    % NaCl 1 250ml.bag @ 11.   





    83 UNITS/KG/HR 9.996 mls/   





    hr IV .Q24H [facility] Rx#:   





    [id number]   


 


  Oral 458 480 240


 


Other:   


 


  Voiding Method Toilet Toilet Toilet


 


  # Voids 2 3 














- Exam


 GENERAL EXAM: Alert, active, comfortable in no apparent distress.


HEAD: Normocephalic/atraumatic.


EYES: Normal reaction of pupils, equal size.  Conjunctiva pink, sclera white.


NOSE: Clear with pink turbinates.


THROAT: No erythema or exudates.


NECK: No masses, no JVD, no thyroid enlargement, no adenopathy.


CHEST: No chest wall deformity.  Symmetrical expansion. 


LUNGS: Equal air entry with no crackles, wheeze, rhonchi or dullness.


CVS: Regular rate and rhythm, normal S1 and S2, no gallops, no murmurs, no rubs


ABDOMEN: Soft, nontender.  No hepatosplenomegaly, normal bowel sounds, no 

guarding or rigidity.


EXTREMITIES: No clubbing, no edema, no cyanosis, 2+ pulses and upper and lower 

extremities.


MUSCULOSKELETAL: Muscle strength and tone normal.


SPINE: No scoliosis or deformity


SKIN: No rashes


CENTRAL NERVOUS SYSTEM: Alert and oriented -3.  No focal deficits, tone is 

normal in all 4 extremities.


PSYCHIATRIC: Alert and oriented -3.  Appropriate affect.  Intact judgment and 

insight.











- Labs


CBC & Chem 7: 


                                 03/25/21 08:28





                                 03/25/21 08:28


Labs: 


                  Abnormal Lab Results - Last 24 Hours (Table)











  03/24/21 03/24/21 03/24/21 Range/Units





  17:06 17:56 20:24 


 


RBC     (4.30-5.90)  m/uL


 


Hgb     (13.0-17.5)  gm/dL


 


Hct     (39.0-53.0)  %


 


Lymphocytes #     (1.0-4.8)  k/uL


 


APTT   47.3 H   (22.0-30.0)  sec


 


Chloride     ()  mmol/L


 


Glucose     (74-99)  mg/dL


 


POC Glucose (mg/dL)  142 H   135 H  (75-99)  mg/dL














  03/25/21 03/25/21 03/25/21 Range/Units





  06:00 08:28 08:28 


 


RBC    3.70 L  (4.30-5.90)  m/uL


 


Hgb    11.7 L  (13.0-17.5)  gm/dL


 


Hct    34.0 L  (39.0-53.0)  %


 


Lymphocytes #    0.7 L  (1.0-4.8)  k/uL


 


APTT     (22.0-30.0)  sec


 


Chloride   109 H   ()  mmol/L


 


Glucose   176 H   (74-99)  mg/dL


 


POC Glucose (mg/dL)  276 H    (75-99)  mg/dL














  03/25/21 03/25/21 Range/Units





  08:28 11:59 


 


RBC    (4.30-5.90)  m/uL


 


Hgb    (13.0-17.5)  gm/dL


 


Hct    (39.0-53.0)  %


 


Lymphocytes #    (1.0-4.8)  k/uL


 


APTT  42.2 H   (22.0-30.0)  sec


 


Chloride    ()  mmol/L


 


Glucose    (74-99)  mg/dL


 


POC Glucose (mg/dL)   142 H  (75-99)  mg/dL








                      Microbiology - Last 24 Hours (Table)











 03/24/21 05:50 Nasal Screen MRSA/MSSA - Preliminary





 Nasal Swab 














Assessment and Plan


Plan: 


 Assessment:





#1.  Acute non-ST elevated myocardial infarction





#2.  Multivessel coronary artery disease, awaiting surgery





#3.  Moderate to severe mitral regurgitation of 3+, being evaluated for surgery





#4.  Diabetes mellitus type 2, insulin dependent





#5.  Lifetime nonsmoker, preop FEV1  58% possibly related to restrictive 

alveolar pattern, related to interstitial edema





#6.  Acute exacerbation of CHF with systolic dysfunction, and mild to moderate 

impairment of left ventricular systolic function EF of 40-45%





#7.  Acute ischemic cardiomyopathy EF of 40-45%





#8.  Left internal carotid stenosis of greater than 70%





#9.  Regular EtOH intake of 3-4 alcoholic drinks on a daily basis





#10.  Hyperlipidemia





Plan:





Continue current medical treatment, no acute events overnight, creatinine FEV1 

was reviewed, patient remains on room air, no completes of worsening dyspnea 

patient is maintained on room air, for surgery tomorrow, we'll continue to 

closely follow





I performed a history & physical examination of the patient and discussed their 

management with my nurse practitioner, Heena Reyes.  I reviewed the nurse 

practitioner's note and agree with the documented findings and plan of care.  

Lung sounds are positive for the next breath sounds.  The findings and the 

impression was discussed with the patient.  I attest to the documentation by the

nurse practitioner. 





Time with Patient: Less than 30

## 2021-03-26 LAB
ALBUMIN SERPL-MCNC: 2.4 G/DL (ref 3.5–5)
ALP SERPL-CCNC: 34 U/L (ref 38–126)
ALT SERPL-CCNC: 16 U/L (ref 4–49)
ANION GAP SERPL CALC-SCNC: 3 MMOL/L
APTT BLD: 24.5 SEC (ref 22–30)
AST SERPL-CCNC: 58 U/L (ref 17–59)
BASOPHILS # BLD AUTO: 0 K/UL (ref 0–0.2)
BASOPHILS NFR BLD AUTO: 0 %
BUN SERPL-SCNC: 9 MG/DL (ref 9–20)
CA-I BLDA-MCNC: 4.2 MG/DL (ref 4.5–5.3)
CA-I BLDA-MCNC: 4.2 MG/DL (ref 4.5–5.3)
CA-I BLDA-MCNC: 4.3 MG/DL (ref 4.5–5.3)
CA-I BLDA-MCNC: 4.5 MG/DL (ref 4.5–5.3)
CALCIUM SPEC-MCNC: 7.9 MG/DL (ref 8.4–10.2)
CHLORIDE SERPL-SCNC: 107 MMOL/L (ref 98–107)
CO2 BLDA-SCNC: 24 MMOL/L (ref 19–24)
CO2 BLDA-SCNC: 25 MMOL/L (ref 19–24)
CO2 BLDA-SCNC: 26 MMOL/L (ref 19–24)
CO2 BLDA-SCNC: 26 MMOL/L (ref 19–24)
CO2 SERPL-SCNC: 26 MMOL/L (ref 22–30)
EOSINOPHIL # BLD AUTO: 0 K/UL (ref 0–0.7)
EOSINOPHIL NFR BLD AUTO: 0 %
EOSINOPHIL NFR BLD AUTO: 1 %
EOSINOPHIL NFR BLD AUTO: 1 %
ERYTHROCYTE [DISTWIDTH] IN BLOOD BY AUTOMATED COUNT: 2.71 M/UL (ref 4.3–5.9)
ERYTHROCYTE [DISTWIDTH] IN BLOOD BY AUTOMATED COUNT: 2.89 M/UL (ref 4.3–5.9)
ERYTHROCYTE [DISTWIDTH] IN BLOOD BY AUTOMATED COUNT: 3.06 M/UL (ref 4.3–5.9)
ERYTHROCYTE [DISTWIDTH] IN BLOOD: 12.8 % (ref 11.5–15.5)
ERYTHROCYTE [DISTWIDTH] IN BLOOD: 12.8 % (ref 11.5–15.5)
ERYTHROCYTE [DISTWIDTH] IN BLOOD: 12.9 % (ref 11.5–15.5)
GLUCOSE BLD-MCNC: 110 MG/DL (ref 75–99)
GLUCOSE BLD-MCNC: 132 MG/DL (ref 75–99)
GLUCOSE BLD-MCNC: 132 MG/DL (ref 75–99)
GLUCOSE BLD-MCNC: 133 MG/DL (ref 75–99)
GLUCOSE BLD-MCNC: 144 MG/DL (ref 75–99)
GLUCOSE BLD-MCNC: 152 MG/DL (ref 75–99)
GLUCOSE BLD-MCNC: 161 MG/DL (ref 75–99)
GLUCOSE BLD-MCNC: 167 MG/DL (ref 75–99)
GLUCOSE BLD-MCNC: 171 MG/DL (ref 75–99)
GLUCOSE BLD-MCNC: 183 MG/DL (ref 75–99)
GLUCOSE BLDA-MCNC: 154 MG/DL (ref 75–99)
GLUCOSE BLDA-MCNC: 171 MG/DL (ref 75–99)
GLUCOSE BLDA-MCNC: 181 MG/DL (ref 75–99)
GLUCOSE BLDA-MCNC: 183 MG/DL (ref 75–99)
GLUCOSE BLDA-MCNC: 260 MG/DL (ref 75–99)
GLUCOSE BLDA-MCNC: 276 MG/DL (ref 75–99)
GLUCOSE BLDA-MCNC: 280 MG/DL (ref 75–99)
GLUCOSE BLDA-MCNC: 301 MG/DL (ref 75–99)
GLUCOSE BLDA-MCNC: 311 MG/DL (ref 75–99)
GLUCOSE SERPL-MCNC: 173 MG/DL (ref 74–99)
HCO3 BLDA-SCNC: 23 MMOL/L (ref 21–25)
HCO3 BLDA-SCNC: 24 MMOL/L (ref 21–25)
HCO3 BLDA-SCNC: 25 MMOL/L (ref 21–25)
HCT VFR BLD AUTO: 24.8 % (ref 39–53)
HCT VFR BLD AUTO: 26.5 % (ref 39–53)
HCT VFR BLD AUTO: 28.3 % (ref 39–53)
HCT VFR BLDA CALC: 24 % (ref 34–46)
HCT VFR BLDA CALC: 24 % (ref 34–46)
HCT VFR BLDA CALC: 25 % (ref 34–46)
HCT VFR BLDA CALC: 28 % (ref 34–46)
HCT VFR BLDA CALC: 30 % (ref 34–46)
HCT VFR BLDA CALC: 31 % (ref 34–46)
HCT VFR BLDA CALC: 33 % (ref 34–46)
HGB BLD-MCNC: 8.4 GM/DL (ref 13–17.5)
HGB BLD-MCNC: 9.1 GM/DL (ref 13–17.5)
HGB BLD-MCNC: 9.8 GM/DL (ref 13–17.5)
INR PPP: 1.2 (ref ?–1.2)
LACTATE BLDA-MCNC: 0.5 MMOL/L (ref 0.5–1.6)
LACTATE BLDA-MCNC: 0.5 MMOL/L (ref 0.5–1.6)
LACTATE BLDA-MCNC: 0.6 MMOL/L (ref 0.5–1.6)
LACTATE BLDA-MCNC: 0.6 MMOL/L (ref 0.5–1.6)
LACTATE BLDA-MCNC: 0.7 MMOL/L (ref 0.5–1.6)
LACTATE BLDA-MCNC: 0.9 MMOL/L (ref 0.5–1.6)
LACTATE BLDA-MCNC: 0.9 MMOL/L (ref 0.5–1.6)
LACTATE BLDA-MCNC: 1.2 MMOL/L (ref 0.5–1.6)
LACTATE BLDA-MCNC: 1.3 MMOL/L (ref 0.5–1.6)
LYMPHOCYTES # SPEC AUTO: 0.4 K/UL (ref 1–4.8)
LYMPHOCYTES # SPEC AUTO: 0.4 K/UL (ref 1–4.8)
LYMPHOCYTES # SPEC AUTO: 1 K/UL (ref 1–4.8)
LYMPHOCYTES NFR SPEC AUTO: 12 %
LYMPHOCYTES NFR SPEC AUTO: 5 %
LYMPHOCYTES NFR SPEC AUTO: 5 %
MAGNESIUM SPEC-SCNC: 2.7 MG/DL (ref 1.6–2.3)
MCH RBC QN AUTO: 31.1 PG (ref 25–35)
MCH RBC QN AUTO: 31.5 PG (ref 25–35)
MCH RBC QN AUTO: 31.9 PG (ref 25–35)
MCHC RBC AUTO-ENTMCNC: 34.1 G/DL (ref 31–37)
MCHC RBC AUTO-ENTMCNC: 34.4 G/DL (ref 31–37)
MCHC RBC AUTO-ENTMCNC: 34.5 G/DL (ref 31–37)
MCV RBC AUTO: 91.4 FL (ref 80–100)
MCV RBC AUTO: 91.6 FL (ref 80–100)
MCV RBC AUTO: 92.3 FL (ref 80–100)
MONOCYTES # BLD AUTO: 0.5 K/UL (ref 0–1)
MONOCYTES # BLD AUTO: 0.6 K/UL (ref 0–1)
MONOCYTES # BLD AUTO: 0.6 K/UL (ref 0–1)
MONOCYTES NFR BLD AUTO: 6 %
MONOCYTES NFR BLD AUTO: 7 %
MONOCYTES NFR BLD AUTO: 8 %
NEUTROPHILS # BLD AUTO: 6.3 K/UL (ref 1.3–7.7)
NEUTROPHILS # BLD AUTO: 6.5 K/UL (ref 1.3–7.7)
NEUTROPHILS # BLD AUTO: 7.9 K/UL (ref 1.3–7.7)
NEUTROPHILS NFR BLD AUTO: 80 %
NEUTROPHILS NFR BLD AUTO: 86 %
NEUTROPHILS NFR BLD AUTO: 87 %
PCO2 BLDA: 36 MMHG (ref 35–45)
PCO2 BLDA: 37 MMHG (ref 35–45)
PCO2 BLDA: 37 MMHG (ref 35–45)
PCO2 BLDA: 38 MMHG (ref 35–45)
PCO2 BLDA: 40 MMHG (ref 35–45)
PCO2 BLDA: 41 MMHG (ref 35–45)
PCO2 BLDA: 41 MMHG (ref 35–45)
PCO2 BLDA: 42 MMHG (ref 35–45)
PCO2 BLDA: 43 MMHG (ref 35–45)
PCO2 BLDA: 44 MMHG (ref 35–45)
PCO2 BLDA: 47 MMHG (ref 35–45)
PH BLDA: 7.3 [PH] (ref 7.35–7.45)
PH BLDA: 7.34 [PH] (ref 7.35–7.45)
PH BLDA: 7.36 [PH] (ref 7.35–7.45)
PH BLDA: 7.37 [PH] (ref 7.35–7.45)
PH BLDA: 7.39 [PH] (ref 7.35–7.45)
PH BLDA: 7.4 [PH] (ref 7.35–7.45)
PH BLDA: 7.41 [PH] (ref 7.35–7.45)
PH BLDA: 7.42 [PH] (ref 7.35–7.45)
PH BLDA: 7.42 [PH] (ref 7.35–7.45)
PLATELET # BLD AUTO: 168 K/UL (ref 150–450)
PLATELET # BLD AUTO: 187 K/UL (ref 150–450)
PLATELET # BLD AUTO: 224 K/UL (ref 150–450)
PO2 BLDA: 104 MMHG (ref 83–108)
PO2 BLDA: 121 MMHG (ref 83–108)
PO2 BLDA: 142 MMHG (ref 83–108)
PO2 BLDA: 155 MMHG (ref 83–108)
PO2 BLDA: 266 MMHG (ref 83–108)
PO2 BLDA: 292 MMHG (ref 83–108)
PO2 BLDA: 294 MMHG (ref 83–108)
PO2 BLDA: 307 MMHG (ref 83–108)
PO2 BLDA: 324 MMHG (ref 83–108)
PO2 BLDA: 328 MMHG (ref 83–108)
PO2 BLDA: 371 MMHG (ref 83–108)
POTASSIUM BLDA-SCNC: 3.9 MMOL/L (ref 3.4–4.5)
POTASSIUM BLDA-SCNC: 3.9 MMOL/L (ref 3.4–4.5)
POTASSIUM BLDA-SCNC: 4 MMOL/L (ref 3.4–4.5)
POTASSIUM BLDA-SCNC: 4.1 MMOL/L (ref 3.4–4.5)
POTASSIUM BLDA-SCNC: 4.1 MMOL/L (ref 3.4–4.5)
POTASSIUM BLDA-SCNC: 4.2 MMOL/L (ref 3.4–4.5)
POTASSIUM BLDA-SCNC: 4.4 MMOL/L (ref 3.4–4.5)
POTASSIUM BLDA-SCNC: 4.5 MMOL/L (ref 3.4–4.5)
POTASSIUM BLDA-SCNC: 5.2 MMOL/L (ref 3.4–4.5)
POTASSIUM SERPL-SCNC: 4.3 MMOL/L (ref 3.5–5.1)
PROT SERPL-MCNC: 4.3 G/DL (ref 6.3–8.2)
PT BLD: 12.2 SEC (ref 9–12)
SODIUM BLDA-SCNC: 133 MMOL/L (ref 135–146)
SODIUM BLDA-SCNC: 134 MMOL/L (ref 135–146)
SODIUM BLDA-SCNC: 135 MMOL/L (ref 135–146)
SODIUM BLDA-SCNC: 135 MMOL/L (ref 135–146)
SODIUM BLDA-SCNC: 136 MMOL/L (ref 135–146)
SODIUM BLDA-SCNC: 137 MMOL/L (ref 135–146)
SODIUM BLDA-SCNC: 137 MMOL/L (ref 135–146)
SODIUM BLDA-SCNC: 138 MMOL/L (ref 135–146)
SODIUM BLDA-SCNC: 138 MMOL/L (ref 135–146)
SODIUM SERPL-SCNC: 136 MMOL/L (ref 137–145)
WBC # BLD AUTO: 7.6 K/UL (ref 3.8–10.6)
WBC # BLD AUTO: 7.9 K/UL (ref 3.8–10.6)
WBC # BLD AUTO: 9.1 K/UL (ref 3.8–10.6)

## 2021-03-26 PROCEDURE — 02100Z9 BYPASS CORONARY ARTERY, ONE ARTERY FROM LEFT INTERNAL MAMMARY, OPEN APPROACH: ICD-10-PCS

## 2021-03-26 PROCEDURE — 4A0305C MEASUREMENT OF ARTERIAL FLOW, CORONARY, OPEN APPROACH: ICD-10-PCS

## 2021-03-26 PROCEDURE — 02100AW BYPASS CORONARY ARTERY, ONE ARTERY FROM AORTA WITH AUTOLOGOUS ARTERIAL TISSUE, OPEN APPROACH: ICD-10-PCS

## 2021-03-26 PROCEDURE — 03BC4ZZ EXCISION OF LEFT RADIAL ARTERY, PERCUTANEOUS ENDOSCOPIC APPROACH: ICD-10-PCS

## 2021-03-26 PROCEDURE — 02L70CK OCCLUSION OF LEFT ATRIAL APPENDAGE WITH EXTRALUMINAL DEVICE, OPEN APPROACH: ICD-10-PCS

## 2021-03-26 PROCEDURE — 5A1221Z PERFORMANCE OF CARDIAC OUTPUT, CONTINUOUS: ICD-10-PCS

## 2021-03-26 PROCEDURE — B24BZZ4 ULTRASONOGRAPHY OF HEART WITH AORTA, TRANSESOPHAGEAL: ICD-10-PCS

## 2021-03-26 PROCEDURE — 06BQ4ZZ EXCISION OF LEFT SAPHENOUS VEIN, PERCUTANEOUS ENDOSCOPIC APPROACH: ICD-10-PCS

## 2021-03-26 PROCEDURE — 021009W BYPASS CORONARY ARTERY, ONE ARTERY FROM AORTA WITH AUTOLOGOUS VENOUS TISSUE, OPEN APPROACH: ICD-10-PCS

## 2021-03-26 PROCEDURE — 02UG0JZ SUPPLEMENT MITRAL VALVE WITH SYNTHETIC SUBSTITUTE, OPEN APPROACH: ICD-10-PCS

## 2021-03-26 RX ADMIN — HYDROCODONE BITARTRATE AND ACETAMINOPHEN PRN EACH: 5; 325 TABLET ORAL at 23:58

## 2021-03-26 RX ADMIN — HEPARIN SODIUM SCH UNIT: 5000 INJECTION, SOLUTION INTRAVENOUS; SUBCUTANEOUS at 23:46

## 2021-03-26 RX ADMIN — IPRATROPIUM BROMIDE AND ALBUTEROL SULFATE SCH: .5; 3 SOLUTION RESPIRATORY (INHALATION) at 21:12

## 2021-03-26 RX ADMIN — SODIUM CHLORIDE, PRESERVATIVE FREE SCH ML: 5 INJECTION INTRAVENOUS at 20:17

## 2021-03-26 RX ADMIN — NOREPINEPHRINE BITARTRATE SCH: 1 INJECTION, SOLUTION, CONCENTRATE INTRAVENOUS at 18:06

## 2021-03-26 RX ADMIN — Medication SCH: at 16:14

## 2021-03-26 RX ADMIN — ATORVASTATIN CALCIUM SCH: 80 TABLET, FILM COATED ORAL at 06:17

## 2021-03-26 RX ADMIN — IPRATROPIUM BROMIDE AND ALBUTEROL SULFATE SCH ML: .5; 3 SOLUTION RESPIRATORY (INHALATION) at 18:52

## 2021-03-26 RX ADMIN — MILRINONE LACTATE SCH MLS/HR: 200 INJECTION, SOLUTION INTRAVENOUS at 16:45

## 2021-03-26 RX ADMIN — NOREPINEPHRINE BITARTRATE SCH MLS/HR: 1 INJECTION, SOLUTION, CONCENTRATE INTRAVENOUS at 19:57

## 2021-03-26 RX ADMIN — KETOROLAC TROMETHAMINE SCH MG: 15 INJECTION, SOLUTION INTRAMUSCULAR; INTRAVENOUS at 18:34

## 2021-03-26 RX ADMIN — IPRATROPIUM BROMIDE AND ALBUTEROL SULFATE SCH: .5; 3 SOLUTION RESPIRATORY (INHALATION) at 18:53

## 2021-03-26 RX ADMIN — ALBUMIN (HUMAN) PRN MLS/HR: 2.5 SOLUTION INTRAVENOUS at 18:50

## 2021-03-26 RX ADMIN — MUPIROCIN SCH APPLIC: 20 OINTMENT TOPICAL at 20:17

## 2021-03-26 RX ADMIN — CEFAZOLIN SCH MLS/HR: 330 INJECTION, POWDER, FOR SOLUTION INTRAMUSCULAR; INTRAVENOUS at 16:45

## 2021-03-26 RX ADMIN — KETOROLAC TROMETHAMINE SCH MG: 15 INJECTION, SOLUTION INTRAMUSCULAR; INTRAVENOUS at 23:45

## 2021-03-26 RX ADMIN — ALBUMIN (HUMAN) PRN MLS/HR: 2.5 SOLUTION INTRAVENOUS at 17:22

## 2021-03-26 RX ADMIN — ACETAMINOPHEN SCH MLS/HR: 10 INJECTION, SOLUTION INTRAVENOUS at 23:44

## 2021-03-26 RX ADMIN — CLOPIDOGREL BISULFATE SCH MG: 75 TABLET ORAL at 20:16

## 2021-03-26 RX ADMIN — ACETAMINOPHEN SCH MLS/HR: 10 INJECTION, SOLUTION INTRAVENOUS at 18:19

## 2021-03-26 RX ADMIN — HEPARIN SODIUM SCH: 10000 INJECTION, SOLUTION INTRAVENOUS at 16:15

## 2021-03-26 RX ADMIN — INSULIN HUMAN SCH MLS/HR: 100 INJECTION, SOLUTION PARENTERAL at 17:11

## 2021-03-26 RX ADMIN — FOLIC ACID SCH: 1 TABLET ORAL at 16:14

## 2021-03-26 RX ADMIN — ASPIRIN 325 MG ORAL TABLET SCH MG: 325 PILL ORAL at 20:17

## 2021-03-26 NOTE — P.PN
Subjective


Progress Note Date: 03/26/21


This is a 51-year-old white male patient with past medical history of diabetes 

mellitus, insulin dependent, and strong family history of premature coronary 

artery disease in his father and his brother who had coronary artery bypass 

grafting surgeries in their early to mid 50s.  Patient presented to the [facility] department on 03/22/2021 symptoms of chest heaviness, general malaise, 

feeling very fatigued, chilled, run down.  Patient had been feeling unwell for 

the last couple of weeks.  He has been having chest pressure particularly at 

nighttime.  Patient follows with a cardiologist in [place].  His EKG showed a 

sinus mechanism with inferior deep T-wave inversions, he had troponin elevation 

with his third troponin peaking at 7.840.  Patient underwent cardiac evaluation,

and had a heart catheterization on 03/23/2021 which showed ostial LAD lesion of 

85%, mid LAD of 60-70%, apical LAD of 50%, diagonal branch 1 of 80%, diagonal 2 

was 90%, circumflex of 50%, RCA stenosis of %.  He had mildly elevated 

left-sided filling pressures with LVEDP of 17.  Echocardiogram showed mild 

concentric LVH, mild to moderately impaired LV function with EF of 40-45%, there

was generalized hypokinesis in the inferobasal, lateral and apical septal wall 

areas.  There was mild pulmonary hypertension with right-sided pressure of 31.4 

mmHg.  ProBNP was elevated at 3030, her chest x-ray showed mild right perihilar 

infiltrate.  No leukocytosis, hemoglobin on admission was 14.6, d-dimer was 

negative at 0.59 lites and renal profile were unremarkable, was checked for 

COVID 19 and the PCR was negative, urinalysis showed 4+ glucose and 1+ ketones 

but no signs of infection.  Review of multivessel coronary artery disease CT 

surgery was consulted us ability of bypass surgery, patient underwent CHETAN today 

and showed no significant aortic valve insufficiency, mitral valve was 

structurally normal, with mild amount of posterior leaflet tethering, and there 

was moderate to severe predominantly central secondary mitral regurgitation.  

Tricuspid valve appear to be normal.  There was no evidence of PFO.  Left atrial

appendage was free of clot.  We are consulted for pulmonary/critical care 

management in the postoperative period, preop FEV1 was completed, and was in the

order of 58% of predicted, and patient denies any chronic lung disease, and he 

is a lifetime nonsmoker.  Patient is undergoing some additional preop testing, 

he was also found to have 70% stenosis of the proximal internal carotid artery 

on the left on the Doppler ultrasound of the carotid arteries.  CT neck was 

completed today showing right ICA 50-60%, and left ICA of greater than 90%.  

During the time of our evaluation patient is sitting up in the recliner, he is 

on room air, breathing comfortably denies any chest pain, or difficulty 

breathing, or socks is 96%, he is afebrile, hemodynamically stable.





03/25/2021 patient seen in follow-up on selective care unit, his increased up in

the recliner, in no acute distress, currently on room air, and his pulse ox is 

96% on room air, he is breathing comfortable, lung sounds reveal some bibasilar 

crackles, no repeat chest x-ray today.  Patient has been maintaining stable O2 

saturations on room air, he's been ambulating in the room, tolerating it well.  

He is working on incentive spirometer, his creatinine FEV1 showed FEV1 of 58%.  

He was found to have carotid artery stenosis, the left ICA of 90%, and the right

ICA 50-60%.  In the plan is for left carotid endarterectomy at a later date.  No

complaints of chest pain, no shortness of breath, patient is on heparin 

infusion, no acute events overnight.





On 03/26/2021 patient is seen in follow-up in the intensive care unit following 

his bypass surgery with LIMA to the LAD, left radial artery graft to the 

circumflex, SVG to the diagonal, SVG to the RCA mitral valve repair, and left 

atrial appendage exclusion.  Patient is sedated, on mechanical ventilator, 

current vent settings are assist-control with a rate of 12, tidal volume is 550,

FiO2 of 100%, and PEEP of 5.  Awaiting a blood gas.  Chest x-ray has been 

completed, ET tube, [place]-Raj catheter, chest tubes are in appropriate 

positions.  And there are persistent mild to moderate left greater than right 

bibasilar opacities.  No significant pleural effusions or pneumothorax.  

Hemodynamically patient is stable, he is on 0.9 normal saline at 50 ML per hour,

Primacor is at 0.25 mics per kilo per minute, nitroglycerin is a 5 mics per kilo

per minute, Diprivan is at 25 mics per kilo per minute.  He is in sinus 

mechanism with a rate of 87, AV wires in place, connected to an external 

pacemaker.  Cardiac output is 7.2, and cardiac index is 3.5, PA pressures 20/16,

with CVP of 7.  Patient has 3 chest tubes in place 2 mediastinal and one left 

pleural, there is 250 out of mediastinal chest tubes of single is output, and 

about 70 mL of cigarettes output in the left pleural.  Estimated blood loss 

Intra-Op was around 2 L, patient received 550 in the Cell Saver.








Objective





- Vital Signs


Vital signs: 


                                   Vital Signs











Temp  99.3 F   03/26/21 06:23


 


Pulse  86   03/26/21 06:23


 


Resp  18   03/26/21 06:23


 


BP  149/90   03/26/21 06:23


 


Pulse Ox  97   03/26/21 06:23








                                 Intake & Output











 03/25/21 03/26/21 03/26/21





 18:59 06:59 18:59


 


Intake Total 1510 390 53


 


Output Total 600  3500


 


Balance 910 390 -3447


 


Weight  85.8 kg 


 


Intake:   


 


  IV  100 53


 


  Intake, IV Titration 250  





  Amount   


 


    Heparin Sod,Pork in 0.45% 250  





    NaCl 25,000 unit In 0.45   





    % NaCl 1 250ml.bag @ 11.   





    83 UNITS/KG/HR 9.996 mls/   





    hr IV .Q24H [facility] Rx#:   





    [id number]   


 


  Oral 1260 290 


 


Output:   


 


  Urine 600  1500


 


  Estimated Blood Loss   2000


 


Other:   


 


  Voiding Method Toilet Toilet 


 


  # Voids  3 














- Exam


 GENERAL EXAM: Sedated, intubated on assist control mode of ventilation white 

male, 51 years old, comfortable, in no acute distress


HEAD: Normocephalic/atraumatic.


EYES: Normal reaction of pupils, equal size.  Conjunctiva pink, sclera white.


NOSE: Clear with pink turbinates.


THROAT: No erythema or exudates.


NECK: No masses, no JVD, no thyroid enlargement, no adenopathy.


CHEST: No chest wall deformity.  Symmetrical expansion.  Midsternal incision is 

clean dry and intact, 2 mediastinal and one left pleural chest tubes in place, 

no air leak, there is 270 mL in the Pleur-evac connected to the 2 mediastinal 

chest tubes and about 70 out of the left pleural chest tube of the sanguinous 

output.  AV wires in place, connected to an external pacemaker, intrinsic rhythm

is sinus rhythm with a rate of 87


LUNGS: Equal air entry with no crackles, wheeze, rhonchi or dullness.


CVS: Regular rate and rhythm, normal S1 and S2, no gallops, no murmurs, no rubs


ABDOMEN: Soft, nontender.  No hepatosplenomegaly, normal bowel sounds, no 

guarding or rigidity.


EXTREMITIES: No clubbing, no edema, no cyanosis, 2+ pulses and upper and lower 

extremities.


MUSCULOSKELETAL: Muscle strength and tone normal.  Left radial artery graft site

is wrapped with Ace wrapped, dated the MAKAYLA drain with minimal sanguinous output, 

bilateral legs are Ace wrapped, and SCDs are on


SPINE: No scoliosis or deformity


SKIN: No rashes


CENTRAL NERVOUS SYSTEM: Sedated, intubated No focal deficits, tone is normal in 

all 4 extremities.














- Labs


CBC & Chem 7: 


                                 03/26/21 16:45





                                 03/25/21 08:28


Labs: 


                  Abnormal Lab Results - Last 24 Hours (Table)











  03/25/21 03/25/21 03/26/21 Range/Units





  08:28 20:20 05:18 


 


ABG pH     (7.35-7.45)  


 


ABG pCO2     (35-45)  mmHg


 


ABG pO2     ()  mmHg


 


ABG Total CO2     (19-24)  mmol/L


 


ABG O2 Saturation     (94-97)  %


 


ABG Hematocrit     (34.0-46.0)  %


 


ABG Sodium     (135-146)  mmol/L


 


ABG Potassium     (3.4-4.5)  mmol/L


 


ABG Ionized Calcium     (4.5-5.3)  mg/dL


 


ABG Glucose     (75-99)  mg/dL


 


Hemoglobin     (13.0-17.5)  gm/dL


 


POC Glucose (mg/dL)   140 H  110 H  (75-99)  mg/dL


 


Arterial Blood Potassium     (3.4-4.5)  mmol/L


 


Arterial Blood Glucose     (75-99)  mg/dL


 


Crossmatch  See Detail    














  03/26/21 03/26/21 03/26/21 Range/Units





  06:48 08:38 10:39 


 


ABG pH     (7.35-7.45)  


 


ABG pCO2     (35-45)  mmHg


 


ABG pO2   307 H  121 H  ()  mmHg


 


ABG Total CO2   25 H  25 H  (19-24)  mmol/L


 


ABG O2 Saturation   100.0 H  99.2 H  (94-97)  %


 


ABG Hematocrit   33 L  30 L  (34.0-46.0)  %


 


ABG Sodium     (135-146)  mmol/L


 


ABG Potassium     (3.4-4.5)  mmol/L


 


ABG Ionized Calcium     (4.5-5.3)  mg/dL


 


ABG Glucose   154 H  171 H  (75-99)  mg/dL


 


Hemoglobin   10.8 L  9.9 L  (13.0-17.5)  gm/dL


 


POC Glucose (mg/dL)  132 H    (75-99)  mg/dL


 


Arterial Blood Potassium     (3.4-4.5)  mmol/L


 


Arterial Blood Glucose   154 H  171 H  (75-99)  mg/dL


 


Crossmatch     














  03/26/21 03/26/21 03/26/21 Range/Units





  11:38 11:52 12:25 


 


ABG pH    7.34 L  (7.35-7.45)  


 


ABG pCO2     (35-45)  mmHg


 


ABG pO2  142 H  371 H  292 H  ()  mmHg


 


ABG Total CO2  25 H  25 H  25 H  (19-24)  mmol/L


 


ABG O2 Saturation  99.6 H  100.0 H  100.0 H  (94-97)  %


 


ABG Hematocrit  31 L  28 L  25 L  (34.0-46.0)  %


 


ABG Sodium    133 L  (135-146)  mmol/L


 


ABG Potassium    5.2 H  (3.4-4.5)  mmol/L


 


ABG Ionized Calcium   4.3 L  4.2 L  (4.5-5.3)  mg/dL


 


ABG Glucose  183 H  181 H  260 H  (75-99)  mg/dL


 


Hemoglobin  10.1 L  9.3 L  8.2 L  (13.0-17.5)  gm/dL


 


POC Glucose (mg/dL)     (75-99)  mg/dL


 


Arterial Blood Potassium    5.2 H  (3.4-4.5)  mmol/L


 


Arterial Blood Glucose  183 H  181 H  260 H  (75-99)  mg/dL


 


Crossmatch     














  03/26/21 03/26/21 03/26/21 Range/Units





  12:49 13:25 14:06 


 


ABG pH  7.30 L    (7.35-7.45)  


 


ABG pCO2  47 H    (35-45)  mmHg


 


ABG pO2  266 H  324 H  328 H  ()  mmHg


 


ABG Total CO2   25 H  25 H  (19-24)  mmol/L


 


ABG O2 Saturation  100.0 H  100.0 H  100.0 H  (94-97)  %


 


ABG Hematocrit  25 L  25 L  24 L  (34.0-46.0)  %


 


ABG Sodium   134 L   (135-146)  mmol/L


 


ABG Potassium     (3.4-4.5)  mmol/L


 


ABG Ionized Calcium  4.3 L  4.2 L  4.3 L  (4.5-5.3)  mg/dL


 


ABG Glucose  276 H  311 H  301 H  (75-99)  mg/dL


 


Hemoglobin  8.2 L  8.0 L  7.8 L  (13.0-17.5)  gm/dL


 


POC Glucose (mg/dL)     (75-99)  mg/dL


 


Arterial Blood Potassium     (3.4-4.5)  mmol/L


 


Arterial Blood Glucose  276 H  311 H  301 H  (75-99)  mg/dL


 


Crossmatch     














  03/26/21 03/26/21 Range/Units





  14:33 16:43 


 


ABG pH    (7.35-7.45)  


 


ABG pCO2    (35-45)  mmHg


 


ABG pO2  294 H   ()  mmHg


 


ABG Total CO2  25 H   (19-24)  mmol/L


 


ABG O2 Saturation  100.0 H   (94-97)  %


 


ABG Hematocrit  24 L   (34.0-46.0)  %


 


ABG Sodium    (135-146)  mmol/L


 


ABG Potassium    (3.4-4.5)  mmol/L


 


ABG Ionized Calcium  4.3 L   (4.5-5.3)  mg/dL


 


ABG Glucose  280 H   (75-99)  mg/dL


 


Hemoglobin  7.8 L   (13.0-17.5)  gm/dL


 


POC Glucose (mg/dL)   183 H  (75-99)  mg/dL


 


Arterial Blood Potassium    (3.4-4.5)  mmol/L


 


Arterial Blood Glucose  280 H   (75-99)  mg/dL


 


Crossmatch    














Assessment and Plan


Plan: 


 Assessment:





#1.  Multivessel coronary artery disease, status post four-vessel coronary 

artery bypass grafting, with LIMA to the LAD, left radial artery graft to the 

circumflex, SVG to the diagonal, SVG to the PDA, and mitral valve repair, and 

left atrial appendage exclusion, postoperative day #0





#2.  Routine postoperative ventilator management





#3.  Acute non-ST elevated myocardial infarction





#4.  Multivessel coronary artery disease, awaiting surgery





#5.  Moderate to severe mitral regurgitation of 3+, being evaluated for surgery





#6.  Diabetes mellitus type 2, insulin dependent





#7.  Lifetime nonsmoker, preop FEV1  58% possibly related to restrictive 

alveolar pattern, related to interstitial edema





#8.  Acute exacerbation of CHF with systolic dysfunction, and mild to moderate 

impairment of left ventricular systolic function EF of 40-45%





#9.  Acute ischemic cardiomyopathy EF of 40-45%





#10.  Left internal carotid stenosis of greater than 70%





#11.  Regular EtOH intake of 3-4 alcoholic drinks on a daily basis





#12.  Hyperlipidemia





Plan:





Increase the rate to 20 breaths per minute, continue weaning FiO2 per protocol, 

awaiting postoperative blood gas, hemodynamically patient is stable, not on any 

vasopressor support, no significant bleeding from the chest tubes, postoperative

lab work and chest x-ray reviewed.  Breathing treatments every 4 hours 

around-the-clock while on the ventilator, creased to 4 times daily once patient 

extubated, incentive spirometer to the bedside, encourage deep breathing and 

coughing, anticipate fast weaning and extubation.  Follow-up labs and chest x-

ray in the morning, we'll continue to closely follow with cardiothoracic 

surgery.  GI and DVT prophylaxis per cardiothoracic surgery.  Close blood sugar 

monitoring


I performed a history & physical examination of the patient and discussed their 

management with my nurse practitioner, Heena Reyes.  I reviewed the nurse 

practitioner's note and agree with the documented findings and plan of care.  

Lung sounds are positive for the next breath sounds.  The findings and the 

impression was discussed with the patient.  I attest to the documentation by the

nurse practitioner. 





Time with Patient: Greater than 30

## 2021-03-26 NOTE — XR
EXAMINATION TYPE: XR chest 1V portable

 

DATE OF EXAM: 3/26/2021

 

COMPARISON: 3/25/2021

 

HISTORY: Postoperative cardiac surgery.

 

TECHNIQUE: Single frontal view of the chest is obtained.

 

FINDINGS:  There are interval cardiothoracic postsurgical changes. There is placement of right IJ Swa
n-Raj catheter with tip overlying the right pulmonary artery, endotracheal tube terminating 3.4 cm a
nasra the thi, nasogastric tube with tip overlying the stomach and left lower chest tube. There is 
moderate pulmonary edema with associated hazy opacity. There are small right and trace left pleural e
ffusions. No pneumothorax. Cardiomegaly is seen.

 

IMPRESSION:  Status post cardiothoracic surgery and support apparatus placement as above.

## 2021-03-26 NOTE — OP
OPERATIVE REPORT



DATE OF SURGERY:

03/26/2021



SURGEON:

Dr. Lucy Saleem.



ASSISTANTS:

1. Carroll Cash, MIR.

2. MIR Triplett.

3. MIR Bravo.



PREOPERATIVE DIAGNOSES:

1. Triple-vessel coronary artery disease.

2. Moderate left ventricular dysfunction.

3. Moderate to severe mitral valve regurgitation.

4. Severe left internal carotid artery stenosis.

5. Diabetes mellitus.

6. Hyperlipidemia.

7. Strong family history of coronary artery disease.



POSTOPERATIVE DIAGNOSES:

1. Triple-vessel coronary artery disease.

2. Moderate left ventricular dysfunction.

3. Moderate to severe mitral valve regurgitation.

4. Severe left internal carotid artery stenosis.

5. Diabetes mellitus.

6. Hyperlipidemia.

7. Strong family history of coronary artery disease.

8. Diffuse coronary artery disease.



PROCEDURE:

1. Quadruple coronary artery bypass grafting using the left internal mammary 
artery to

    the left anterior descending artery, reverse saphenous vein graft from the 
aorta to

    the first diagonal artery, left radial artery taken proximally from the 
previous

    vein graft and anastomosed distally to the distal circumflex artery, reverse

    saphenous vein graft from the aorta to the right coronary artery.

2. Mitral valve repair with posterior annuloplasty using a 28 mm incomplete 
AnnuloFlex

    ring.

3. Exclusion of the left atrial appendage using a 35 mm AtriClip.

4. Intraoperative graft flow measurements using the [employer] system.

5. Endoscopic harvesting of the left radial artery.

6. Endoscopic harvesting of left greater saphenous vein.

7. Intraoperative transesophageal echocardiogram and epiaortic scanning.



INDICATION FOR SURGERY:

Patient is a 51-year-old gentleman with the above comorbidities with a very 
strong

family history who had been complaining of malaise for several days. He came 
into the

emergency room to rule out COVID. However, he was found to have positive 
troponin and

EKG changes.  The patient was ruled in for non-ST-elevation myocardial 
infarction.

Echo and cardiac catheterization as well as CHETAN concluded diffuse triple-vessel

coronary artery disease, moderate left ventricular dysfunction and moderate to 
severe

ischemic mitral valve regurgitation.  The patient was optimized over several 
days, and

at this point he has been taken for coronary artery bypass grafting as well as 
mitral

valve repair.  His carotid duplex as well as a CTA head showed 80% to 90% 
stenosis that

is asymptomatic of the left internal carotid artery.  That was discussed with 
his

primary cardiologist and the decision was made not to address the carotid in the
same

setting.  That has been explained to the patient and the family.  They 
understood all

the risks and have agreed to proceed.



DESCRIPTION OF PROCEDURE:

With the patient in supine position, right internal jugular [place]-Raj catheter 
and

right radial arterial line were placed.  His cardiac index was 2.4 and his PA 
pressure

was 48/28.  Subsequently he was brought to the operating room, where general

endotracheal anesthesia was induced uneventfully.  A Ramirez catheter was 
inserted.  The

chest, abdomen, both lower extremities and the left upper extremity were prepped
and

draped using ChloraPrep.  Ioban was used to cover the skin.  Patient received 2 
grams

of cefazolin intravenously.



Midline sternotomy was performed and no bone wax was used.  We used BoneSeal.  
The bone

was mildly osteoporotic in its lower third.  The left hemisternum was elevated 
and left

internal mammary artery was harvested in a somewhat skeletonized fashion.  The 
left

pleura was intentionally opened in this process and was drained with a 19-Danish
Kenneth

drain.  The patient was given 5000 units of heparin.  The mammary artery was 
clipped

distally and transected. It had an excellent pulsatile flow in it and was around
2 mm

in diameter.



In the same setting, left radial artery endoscopic harvesting took place.  It 
was

initially exposed at the wrist and a clamping trial revealed preserved signal in
the

left index plethysmography probe. So the artery was harvested endoscopically, 
initially without

tourniquet.  Then at the end, as there was proximal branch bleeding, a 
tourniquet was

inflated for a brief period and the proximal stump controlled.  The forearm 
incision

was closed over a drain.  The radial artery was prepared by incising the fascia 
all

along its volar aspect and clipping all its branches.  It was of good quality, 
around

2.5 mm in diameter.



Also in the same setting, the left greater saphenous vein was harvested 
endoscopically

from groin to just below knee level.  The leg incisions were closed over a 
drain.  The

vein was prepared and it had large varicosities in its lower aspect.  However, 
we had a

reasonable quality segment in the thigh level to use.



Laura mitral retractor was used.  Mediastinal fat was transected between 2 
ties.

Epiaortic scanning revealed concentric intimal thickening but no protruding 
atheroma in

the ascending aorta.  Pericardium was opened in an inverted T-fashion and a 
pericardial

cradle was created.



Findings included a normal soft aorta and enlarged heart.  There was diffuse 
coronary

artery disease.



After systemic heparinization, after placement of respective pledgeted 
pursestrings,

aortic cannulation in the proximal arch with a 21-Danish Soft flow cannula, 
direct SVC

cannulation with a 28-Danish right-angle cannula and IVC cannulation at the 
junction

with the right atrium with a 32-Danish straight cannula were performed.  
Antegrade as

well as retrograde cardioplegia catheters were placed.



Cardiopulmonary bypass was initiated and patient's temperature was allowed to 
drift

down to 34 degrees Celsius.  With the heart empty and beating, we looked at the 
target.

The LAD, as known on cath, was diffusely diseased.  I elected to do it between 
the 2

diagonal arteries where it was of good caliber and thin-walled in its mid 
aspect.  The

first diagonal artery was identified before it went intramyocardial beyond the 
diseased

segment and would be a site for bypass.  The distal circumflex artery also was

identified after coming off an intramyocardial course and had diffuse disease in
it.

Looking at the inferior wall, there was an early rising PDA and a good-sized 
right

coronary artery leading into it.  The PDA was diffusely diseased.  I elected to 
do the

soft segment of RCA.



Both cavae were encircled with umbilical tape.  Aorta was clamped, and during 
aortic

clamping myocardial protection was achieved.  Initial dose of 1 L of antegrade 
cold

blood cardioplegia was followed by 500 mL of retrograde cold blood cardioplegia.
 All

subsequent doses were given retrograde at 15- to 20-minute intervals.



The first distal anastomosis was between a segment of vein of around 5 mm in 
diameter

(however, uniform) and the 2 mm right coronary artery in its distal aspect where
it was

thin-walled using Prolene 7-0 in continuous fashion.  When I opened the right 
coronary

artery, I was able to pass a 1 mm probe into the PDA.  The second distal 
anastomosis

was between the radial artery and the distal obtuse marginal artery, which when 
opened

was around 1.25 mm in diameter, thin-walled.  I placed a shunt in it.  Beyond 
the

anastomosis, there was eccentric plaque.  The radial artery was anastomosed to 
that OM

using Prolene 7-0 in continuous fashion.  The third distal anastomosis was 
between

another segment of vein of reasonable quality and the 1.25 thin-walled segment 
of the

proximal first diagonal artery using Prolene 7-0 in continuous fashion.  The 
fourth and

last distal anastomosis was between the left internal mammary artery and the mid
aspect

of the left anterior descending artery which when opened was around 1.75 mm in 
diameter

with a posterior plaque; however, thin anterior and lateral wall, using Prolene 
7-0 in

continuous fashion.



At this point, attention was moved to the mitral valve repair part.  A standard

transverse left atriotomy was performed.  It was a bit hard to expose the mitral
valve.

We eventually were able to pass a total of 8 TiCron 2-0 non-pledgeted sutures 
from

trigone to trigone posteriorly, and a 28 mm AnnuloFlex ring was used in its 
posterior

segment only.  The sutures were passed into the ring, which seated nicely.  The 
needles

were cut and the suture tied using the Core-knot device.  Testing of the valve 
revealed

no leak at this point.  The left atriotomy was closed in one layer using Prolene
4-0

pledgeted on each corner and meeting in the midline.  CO2 was flowing over the 
field as

long as the left atrium was opened, and de-airing maneuvers were done before 
completing

closure of atriotomy.



Left atrial appendage had been excluded with a 35 mm AtriClip deployed at its 
base.



At this point, rewarming was started.  We punched out 2 buttons of the ascending
aorta,

and the 2 vein graft were anastomosed using Prolene 6-0 in a continuous fashion.
 The

radial artery, being that it went to the distal circumflex artery in a gentleman
with a

short forearm, would not reach the aorta.  I elected to take it as inflow from 
the vein

graft going to the first diagonal artery.  A venotomy was made around 3 cm 
proximal to

the vein cobos and the aorta, and the radial artery was anastomosed to that area 
over

the pulmonary artery using Prolene 7-0 in continuous fashion.  De-airing was 
performed.

One liter of retrograde blood was given as we were performing the last 
anastomosis.

Patient was given lidocaine and magnesium.  The patient was placed in 
Trendelenburg

position and de-airing maneuvers were performed again one more time and the 
aorta was

unclamped.  The patient regained spontaneous sinus rhythm.  All anastomoses 
appeared

hemostatic.  After around 15 minutes of reperfusion and after half load of 
Primacor,

we were able to wean off cardiopulmonary bypass with low-dose Levophed and low-
dose

Primacor.  Echo showed improved LV function.  No mitral valve regurgitation.  
De-airing

maneuver was guided by CHETAN and it was eventually satisfactory.  Eventually again
we

weaned off cardiopulmonary bypass.  All suckers were stopped, as we gave test-
dose then

full-dose protamine.  Decannulation followed.  No reinforcement sutures were 
required.

Two monopolar atrial pacing wires were affixed to the respective pursestrings of
the

right atrium and one bipolar ventricular pacing wire was driven via the inferior
aspect

of the right ventricle.  Two 19-Danish Kenneth drains were left substernally.  A 
groove

was made in the left pleuropericardial fat to accommodate the mammary artery 
medial to

the lung and away from the posterior sternal table.  The right graft coursed 
over the

AV groove.



Pericardial fat was approximated over the heart and the aorta.



After ensuring adequate hemostasis and hemodynamics and after correct sponge,

instrument and needle counts, the sternum was closed using 5 figure-of-eight 
[place]

cables after interposing Fibrillar between the sternal edges.  Thorough 
irrigation with

cefazolin followed.  The rest of the closure proceeded in layers.  Skin glue was

applied.



Patient's cardiac index was 3 and PA pressure was 40/20 with normal sinus rhythm
at 80

and mean arterial pressure of 73 on low-dose Primacor and Levophed.



Patient did not receive any blood bank products but received 550 mL of Cell 
Saver

blood.





MMODL / IJN: [id number] / Job#: [id number]

MTDD

## 2021-03-26 NOTE — P.PN
Subjective


Progress Note Date: 03/26/21





Pt intubated and sedated, doing well s/p 4v CABG, mitral repair and left 

appendage clipping; has mediastinal chest tube in diaphragmatic position, 

draining well, [place]-marcia in good place.





Objective





- Vital Signs


Vital signs: 


                                   Vital Signs











Temp  99.3 F   03/26/21 06:23


 


Pulse  86   03/26/21 06:23


 


Resp  18   03/26/21 06:23


 


BP  149/90   03/26/21 06:23


 


Pulse Ox  97   03/26/21 06:23








                                 Intake & Output











 03/25/21 03/26/21 03/26/21





 18:59 06:59 18:59


 


Intake Total 1510 390 53


 


Output Total 600  3500


 


Balance 910 390 -3447


 


Weight  85.8 kg 


 


Intake:   


 


  IV  100 53


 


  Intake, IV Titration 250  





  Amount   


 


    Heparin Sod,Pork in 0.45% 250  





    NaCl 25,000 unit In 0.45   





    % NaCl 1 250ml.bag @ 11.   





    83 UNITS/KG/HR 9.996 mls/   





    hr IV .Q24H [facility] Rx#:   





    [id number]   


 


  Oral 1260 290 


 


Output:   


 


  Urine 600  1500


 


  Estimated Blood Loss   2000


 


Other:   


 


  Voiding Method Toilet Toilet 


 


  # Voids  3 














- Exam





Gen: intubated, sedated


HEENT: normocephalic, atraumatic, moist mucous membranes


Resp: vent - AC, 100%, 500, PEEP 5


CVS: good distal perfusion x 4, regular rate and rhythm without murmurs


GI: soft, NTTP, ND, appropriate bowel sounds


: no SPT, no CVAT, gonzalez catheter is present


MSK: no pitting edema, no clubbing


Neuro: non-focal, moving all extremities


 





- Labs


CBC & Chem 7: 


                                 03/25/21 08:28





                                 03/25/21 08:28


Labs: 


                  Abnormal Lab Results - Last 24 Hours (Table)











  03/25/21 03/25/21 03/26/21 Range/Units





  08:28 20:20 05:18 


 


ABG pH     (7.35-7.45)  


 


ABG pCO2     (35-45)  mmHg


 


ABG pO2     ()  mmHg


 


ABG Total CO2     (19-24)  mmol/L


 


ABG O2 Saturation     (94-97)  %


 


ABG Hematocrit     (34.0-46.0)  %


 


ABG Sodium     (135-146)  mmol/L


 


ABG Potassium     (3.4-4.5)  mmol/L


 


ABG Ionized Calcium     (4.5-5.3)  mg/dL


 


ABG Glucose     (75-99)  mg/dL


 


Hemoglobin     (13.0-17.5)  gm/dL


 


POC Glucose (mg/dL)   140 H  110 H  (75-99)  mg/dL


 


Arterial Blood Potassium     (3.4-4.5)  mmol/L


 


Arterial Blood Glucose     (75-99)  mg/dL


 


Crossmatch  See Detail    














  03/26/21 03/26/21 03/26/21 Range/Units





  06:48 08:38 10:39 


 


ABG pH     (7.35-7.45)  


 


ABG pCO2     (35-45)  mmHg


 


ABG pO2   307 H  121 H  ()  mmHg


 


ABG Total CO2   25 H  25 H  (19-24)  mmol/L


 


ABG O2 Saturation   100.0 H  99.2 H  (94-97)  %


 


ABG Hematocrit   33 L  30 L  (34.0-46.0)  %


 


ABG Sodium     (135-146)  mmol/L


 


ABG Potassium     (3.4-4.5)  mmol/L


 


ABG Ionized Calcium     (4.5-5.3)  mg/dL


 


ABG Glucose   154 H  171 H  (75-99)  mg/dL


 


Hemoglobin   10.8 L  9.9 L  (13.0-17.5)  gm/dL


 


POC Glucose (mg/dL)  132 H    (75-99)  mg/dL


 


Arterial Blood Potassium     (3.4-4.5)  mmol/L


 


Arterial Blood Glucose   154 H  171 H  (75-99)  mg/dL


 


Crossmatch     














  03/26/21 03/26/21 03/26/21 Range/Units





  11:38 11:52 12:25 


 


ABG pH    7.34 L  (7.35-7.45)  


 


ABG pCO2     (35-45)  mmHg


 


ABG pO2  142 H  371 H  292 H  ()  mmHg


 


ABG Total CO2  25 H  25 H  25 H  (19-24)  mmol/L


 


ABG O2 Saturation  99.6 H  100.0 H  100.0 H  (94-97)  %


 


ABG Hematocrit  31 L  28 L  25 L  (34.0-46.0)  %


 


ABG Sodium    133 L  (135-146)  mmol/L


 


ABG Potassium    5.2 H  (3.4-4.5)  mmol/L


 


ABG Ionized Calcium   4.3 L  4.2 L  (4.5-5.3)  mg/dL


 


ABG Glucose  183 H  181 H  260 H  (75-99)  mg/dL


 


Hemoglobin  10.1 L  9.3 L  8.2 L  (13.0-17.5)  gm/dL


 


POC Glucose (mg/dL)     (75-99)  mg/dL


 


Arterial Blood Potassium    5.2 H  (3.4-4.5)  mmol/L


 


Arterial Blood Glucose  183 H  181 H  260 H  (75-99)  mg/dL


 


Crossmatch     














  03/26/21 03/26/21 03/26/21 Range/Units





  12:49 13:25 14:06 


 


ABG pH  7.30 L    (7.35-7.45)  


 


ABG pCO2  47 H    (35-45)  mmHg


 


ABG pO2  266 H  324 H  328 H  ()  mmHg


 


ABG Total CO2   25 H  25 H  (19-24)  mmol/L


 


ABG O2 Saturation  100.0 H  100.0 H  100.0 H  (94-97)  %


 


ABG Hematocrit  25 L  25 L  24 L  (34.0-46.0)  %


 


ABG Sodium   134 L   (135-146)  mmol/L


 


ABG Potassium     (3.4-4.5)  mmol/L


 


ABG Ionized Calcium  4.3 L  4.2 L  4.3 L  (4.5-5.3)  mg/dL


 


ABG Glucose  276 H  311 H  301 H  (75-99)  mg/dL


 


Hemoglobin  8.2 L  8.0 L  7.8 L  (13.0-17.5)  gm/dL


 


POC Glucose (mg/dL)     (75-99)  mg/dL


 


Arterial Blood Potassium     (3.4-4.5)  mmol/L


 


Arterial Blood Glucose  276 H  311 H  301 H  (75-99)  mg/dL


 


Crossmatch     














  03/26/21 03/26/21 Range/Units





  14:33 16:43 


 


ABG pH    (7.35-7.45)  


 


ABG pCO2    (35-45)  mmHg


 


ABG pO2  294 H   ()  mmHg


 


ABG Total CO2  25 H   (19-24)  mmol/L


 


ABG O2 Saturation  100.0 H   (94-97)  %


 


ABG Hematocrit  24 L   (34.0-46.0)  %


 


ABG Sodium    (135-146)  mmol/L


 


ABG Potassium    (3.4-4.5)  mmol/L


 


ABG Ionized Calcium  4.3 L   (4.5-5.3)  mg/dL


 


ABG Glucose  280 H   (75-99)  mg/dL


 


Hemoglobin  7.8 L   (13.0-17.5)  gm/dL


 


POC Glucose (mg/dL)   183 H  (75-99)  mg/dL


 


Arterial Blood Potassium    (3.4-4.5)  mmol/L


 


Arterial Blood Glucose  280 H   (75-99)  mg/dL


 


Crossmatch    














Assessment and Plan


Assessment: 





NSTEMI


-Troponin 3.120. Troponins trended upward with initial troponin is 3.120  with 

repeats of 6.080 and 7.840.


-EKG showing sinus tachycardia at 105 bpm with T-wave inversion in leads III and

aVF, no signs of ST elevation.  No previous EKGs available for comparison.


-Continue heparin infusion, pharmacy to dose.


-Cardiology following, patient scheduled to have cardiac cath = multivessel CAD;


   - CT surgery consulted, s/p CABG 3/26


-Telemetry monitoring.


-Close monitoring of I's and O's.


-Patient being placed on daily aspirin, atorvastatin, and metoprolol.


-Lipid profile revealed elevation of triglycerides 200, cholesterol 241, LDL 

164, HDL of 37.  Atorvastatin increased to 80 mg nightly.


- currently on milrinone gtt, nitro gtt; has been off-titrated levophed gtt








Insulin-dependent diabetes mellitus type II


-Glycemic protocol with NovoLog sliding scale and continuation of Lantus 20 

units daily.


-Hemoglobin A1c = 8.2%








The patient is admitted with an anticipated greater than 2 midnight stay for 

evaluation of NSTEMI.


Surrogate decision-maker: Self


CODE STATUS: Full code


DVT prophylaxis: Heparin


Discussed with: Patient


Anticipated discharge date: Clinical course to determine


Anticipated discharge place: Home

## 2021-03-27 LAB
ALBUMIN SERPL-MCNC: 2.8 G/DL (ref 3.5–5)
ALP SERPL-CCNC: 38 U/L (ref 38–126)
ALT SERPL-CCNC: 23 U/L (ref 4–49)
ANION GAP SERPL CALC-SCNC: 4 MMOL/L
AST SERPL-CCNC: 77 U/L (ref 17–59)
BASOPHILS # BLD AUTO: 0 K/UL (ref 0–0.2)
BASOPHILS NFR BLD AUTO: 0 %
BUN SERPL-SCNC: 13 MG/DL (ref 9–20)
CALCIUM SPEC-MCNC: 7.6 MG/DL (ref 8.4–10.2)
CHLORIDE SERPL-SCNC: 108 MMOL/L (ref 98–107)
CO2 SERPL-SCNC: 23 MMOL/L (ref 22–30)
EOSINOPHIL # BLD AUTO: 0 K/UL (ref 0–0.7)
EOSINOPHIL NFR BLD AUTO: 0 %
ERYTHROCYTE [DISTWIDTH] IN BLOOD BY AUTOMATED COUNT: 2.74 M/UL (ref 4.3–5.9)
ERYTHROCYTE [DISTWIDTH] IN BLOOD: 12.3 % (ref 11.5–15.5)
GLUCOSE BLD-MCNC: 123 MG/DL (ref 75–99)
GLUCOSE BLD-MCNC: 129 MG/DL (ref 75–99)
GLUCOSE BLD-MCNC: 131 MG/DL (ref 75–99)
GLUCOSE BLD-MCNC: 133 MG/DL (ref 75–99)
GLUCOSE BLD-MCNC: 138 MG/DL (ref 75–99)
GLUCOSE BLD-MCNC: 141 MG/DL (ref 75–99)
GLUCOSE BLD-MCNC: 145 MG/DL (ref 75–99)
GLUCOSE BLD-MCNC: 146 MG/DL (ref 75–99)
GLUCOSE BLD-MCNC: 154 MG/DL (ref 75–99)
GLUCOSE BLD-MCNC: 156 MG/DL (ref 75–99)
GLUCOSE BLD-MCNC: 160 MG/DL (ref 75–99)
GLUCOSE BLD-MCNC: 171 MG/DL (ref 75–99)
GLUCOSE BLD-MCNC: 171 MG/DL (ref 75–99)
GLUCOSE BLD-MCNC: 173 MG/DL (ref 75–99)
GLUCOSE BLD-MCNC: 194 MG/DL (ref 75–99)
GLUCOSE BLD-MCNC: 206 MG/DL (ref 75–99)
GLUCOSE BLD-MCNC: 247 MG/DL (ref 75–99)
GLUCOSE SERPL-MCNC: 117 MG/DL (ref 74–99)
HCT VFR BLD AUTO: 24.9 % (ref 39–53)
HGB BLD-MCNC: 8.8 GM/DL (ref 13–17.5)
LYMPHOCYTES # SPEC AUTO: 0.8 K/UL (ref 1–4.8)
LYMPHOCYTES NFR SPEC AUTO: 8 %
MAGNESIUM SPEC-SCNC: 2.2 MG/DL (ref 1.6–2.3)
MCH RBC QN AUTO: 32.1 PG (ref 25–35)
MCHC RBC AUTO-ENTMCNC: 35.4 G/DL (ref 31–37)
MCV RBC AUTO: 90.6 FL (ref 80–100)
MONOCYTES # BLD AUTO: 0.7 K/UL (ref 0–1)
MONOCYTES NFR BLD AUTO: 7 %
NEUTROPHILS # BLD AUTO: 8 K/UL (ref 1.3–7.7)
NEUTROPHILS NFR BLD AUTO: 84 %
PLATELET # BLD AUTO: 228 K/UL (ref 150–450)
POTASSIUM SERPL-SCNC: 5.4 MMOL/L (ref 3.5–5.1)
PROT SERPL-MCNC: 4.7 G/DL (ref 6.3–8.2)
SODIUM SERPL-SCNC: 135 MMOL/L (ref 137–145)
WBC # BLD AUTO: 9.6 K/UL (ref 3.8–10.6)

## 2021-03-27 RX ADMIN — MUPIROCIN SCH APPLIC: 20 OINTMENT TOPICAL at 08:47

## 2021-03-27 RX ADMIN — SODIUM CHLORIDE, PRESERVATIVE FREE SCH ML: 5 INJECTION INTRAVENOUS at 20:52

## 2021-03-27 RX ADMIN — Medication SCH MG: at 08:36

## 2021-03-27 RX ADMIN — ASPIRIN 325 MG ORAL TABLET SCH MG: 325 PILL ORAL at 08:32

## 2021-03-27 RX ADMIN — NOREPINEPHRINE BITARTRATE SCH MLS/HR: 1 INJECTION, SOLUTION, CONCENTRATE INTRAVENOUS at 18:25

## 2021-03-27 RX ADMIN — INSULIN HUMAN SCH MLS/HR: 100 INJECTION, SOLUTION PARENTERAL at 19:08

## 2021-03-27 RX ADMIN — KETOROLAC TROMETHAMINE SCH MG: 15 INJECTION, SOLUTION INTRAMUSCULAR; INTRAVENOUS at 23:12

## 2021-03-27 RX ADMIN — FOLIC ACID SCH MG: 1 TABLET ORAL at 08:32

## 2021-03-27 RX ADMIN — IPRATROPIUM BROMIDE AND ALBUTEROL SULFATE SCH ML: .5; 3 SOLUTION RESPIRATORY (INHALATION) at 08:33

## 2021-03-27 RX ADMIN — HEPARIN SODIUM SCH UNIT: 5000 INJECTION, SOLUTION INTRAVENOUS; SUBCUTANEOUS at 08:31

## 2021-03-27 RX ADMIN — HYDROCODONE BITARTRATE AND ACETAMINOPHEN PRN EACH: 5; 325 TABLET ORAL at 13:30

## 2021-03-27 RX ADMIN — IPRATROPIUM BROMIDE AND ALBUTEROL SULFATE SCH ML: .5; 3 SOLUTION RESPIRATORY (INHALATION) at 16:33

## 2021-03-27 RX ADMIN — CLOPIDOGREL BISULFATE SCH MG: 75 TABLET ORAL at 08:31

## 2021-03-27 RX ADMIN — KETOROLAC TROMETHAMINE SCH MG: 15 INJECTION, SOLUTION INTRAMUSCULAR; INTRAVENOUS at 18:23

## 2021-03-27 RX ADMIN — NOREPINEPHRINE BITARTRATE SCH MLS/HR: 1 INJECTION, SOLUTION, CONCENTRATE INTRAVENOUS at 13:59

## 2021-03-27 RX ADMIN — HEPARIN SODIUM SCH UNIT: 5000 INJECTION, SOLUTION INTRAVENOUS; SUBCUTANEOUS at 23:11

## 2021-03-27 RX ADMIN — CEFAZOLIN SCH MLS/HR: 330 INJECTION, POWDER, FOR SOLUTION INTRAMUSCULAR; INTRAVENOUS at 19:10

## 2021-03-27 RX ADMIN — DOCUSATE SODIUM AND SENNOSIDES SCH EACH: 50; 8.6 TABLET ORAL at 20:51

## 2021-03-27 RX ADMIN — HEPARIN SODIUM SCH UNIT: 5000 INJECTION, SOLUTION INTRAVENOUS; SUBCUTANEOUS at 15:20

## 2021-03-27 RX ADMIN — MILRINONE LACTATE SCH: 200 INJECTION, SOLUTION INTRAVENOUS at 03:56

## 2021-03-27 RX ADMIN — METOPROLOL TARTRATE SCH MG: 25 TABLET, FILM COATED ORAL at 20:51

## 2021-03-27 RX ADMIN — ONDANSETRON PRN MG: 2 INJECTION INTRAMUSCULAR; INTRAVENOUS at 05:08

## 2021-03-27 RX ADMIN — METOPROLOL TARTRATE SCH MG: 25 TABLET, FILM COATED ORAL at 08:33

## 2021-03-27 RX ADMIN — SODIUM CHLORIDE, PRESERVATIVE FREE SCH ML: 5 INJECTION INTRAVENOUS at 08:36

## 2021-03-27 RX ADMIN — NOREPINEPHRINE BITARTRATE SCH MLS/HR: 1 INJECTION, SOLUTION, CONCENTRATE INTRAVENOUS at 06:12

## 2021-03-27 RX ADMIN — MUPIROCIN SCH APPLIC: 20 OINTMENT TOPICAL at 20:51

## 2021-03-27 RX ADMIN — HYDROCODONE BITARTRATE AND ACETAMINOPHEN PRN EACH: 5; 325 TABLET ORAL at 20:51

## 2021-03-27 RX ADMIN — KETOROLAC TROMETHAMINE SCH MG: 15 INJECTION, SOLUTION INTRAMUSCULAR; INTRAVENOUS at 06:11

## 2021-03-27 RX ADMIN — HYDROCODONE BITARTRATE AND ACETAMINOPHEN PRN EACH: 5; 325 TABLET ORAL at 17:08

## 2021-03-27 RX ADMIN — ATORVASTATIN CALCIUM SCH MG: 80 TABLET, FILM COATED ORAL at 08:32

## 2021-03-27 RX ADMIN — ALBUMIN (HUMAN) PRN MLS/HR: 2.5 SOLUTION INTRAVENOUS at 06:49

## 2021-03-27 RX ADMIN — IPRATROPIUM BROMIDE AND ALBUTEROL SULFATE SCH ML: .5; 3 SOLUTION RESPIRATORY (INHALATION) at 13:18

## 2021-03-27 RX ADMIN — HYDROCODONE BITARTRATE AND ACETAMINOPHEN PRN EACH: 5; 325 TABLET ORAL at 08:25

## 2021-03-27 RX ADMIN — IPRATROPIUM BROMIDE AND ALBUTEROL SULFATE SCH ML: .5; 3 SOLUTION RESPIRATORY (INHALATION) at 20:29

## 2021-03-27 RX ADMIN — ALBUMIN (HUMAN) PRN MLS/HR: 2.5 SOLUTION INTRAVENOUS at 06:10

## 2021-03-27 RX ADMIN — KETOROLAC TROMETHAMINE SCH MG: 15 INJECTION, SOLUTION INTRAMUSCULAR; INTRAVENOUS at 13:31

## 2021-03-27 RX ADMIN — HYDROCODONE BITARTRATE AND ACETAMINOPHEN PRN EACH: 5; 325 TABLET ORAL at 03:57

## 2021-03-27 NOTE — P.PN
Subjective


Progress Note Date: 03/27/21


Principal diagnosis: 





Myocardial infarction





Patient feeling well.  He denied chest pain or shortness of breath.  No fevers 

or chills.  No other complaints.  No overnight events.





Objective





- Vital Signs


Vital signs: 


                                   Vital Signs











Temp  37.2 F L  03/27/21 08:00


 


Pulse  84   03/27/21 15:00


 


Resp  15   03/27/21 15:00


 


BP  104/69   03/27/21 10:15


 


Pulse Ox  96   03/27/21 15:00








                                 Intake & Output











 03/26/21 03/27/21 03/27/21





 18:59 06:59 18:59


 


Intake Total 441.899 0163.583 1381.760


 


Output Total 4340 852 502


 


Balance -3865.307 910.583 879.760


 


Weight  89.9 kg 89.9 kg


 


Intake:   


 


  IV 72 918 601


 


    .9NS Pressure Bag 9 108 81


 


    Cardiac Output 10 160 50


 


    Sodium Chloride 0.9% 1,  550 420





    000 ml @ 20 mls/hr IV .   





    Q24H [facility] Rx#:[id number]   


 


    ceFAZolin 2 gm In Sodium  100 50





    Chloride 0.9% 50 ml @ 100   





    mls/hr IVPB Q8HR [facility] Rx#   





    :[id number]   


 


  Intake, IV Titration 402.693 844.583 540.760





  Amount   


 


    Albumin Human 5% 250 ml @ 250 500 250





    0 mls/hr IVPB .STK-MED   





    ONE Rx#:[id number]   


 


    Insulin Regular 100 unit 2.693 23.533 48.093





    In Sodium Chloride 0.9%   





    100 ml @ Per Protocol IV   





    .Q0M [facility] Rx#:[id number]   


 


    Milrinone-D5w Pmx 20 mg  54.805 





    In Dextrose/Water 1 100ml   





    .bag @ 0.25 MCG/KG/MIN 6.   





    435 mls/hr IV .[id number]   





    [facility] Rx#:[id number]   


 


    Norepinephrine 4 mg In  216.245 242.667





    Sodium Chloride 0.9% 250   





    ml @ 0.05 MCG/KG/MIN 16.   





    345 mls/hr IV .[id number]   





    [facility] Rx#:[id number]   


 


    Sodium Chloride 0.9% 1, 100 50 





    000 ml @ 20 mls/hr IV .   





    Q24H [facility] Rx#:[id number]   


 


    ceFAZolin 2 gm In Sodium 50  





    Chloride 0.9% 50 ml @ 100   





    mls/hr IVPB Q8HR [facility] Rx#   





    :[id number]   


 


  Oral   240


 


Output:   


 


  Chest Tube Drainage 360 422 140


 


    Left Pleural Chest tube 120 182 110


 


    Mediastinal Chest Tubes X 240 240 30





    2   


 


  Drainage  0 0


 


    Left Wrist  0 0


 


  Urine 1980 430 362


 


  Estimated Blood Loss 2000  


 


Other:   


 


  Voiding Method  Indwelling Catheter Indwelling Catheter








                       ABP, PAP, CO, CI - Last Documented











Arterial Blood Pressure        114/53


 


Pulmonary Artery Pressure      28/10


 


Cardiac Output                 4.4


 


Cardiac Index                  2.2

















- Exam





Gen: No acute distress, extubated


HEENT: normocephalic, atraumatic, moist mucous membranes


Resp: Clear bilaterally


CVS: good distal perfusion x 4, regular rate and rhythm without murmurs


GI: soft, NTTP, ND, appropriate bowel sounds


: no SPT, no CVAT, gonzalez catheter is present


MSK: no pitting edema, no clubbing


Neuro: non-focal, moving all extremities





- Labs


CBC & Chem 7: 


                                 03/27/21 04:17





                                 03/27/21 04:17


Labs: 


                  Abnormal Lab Results - Last 24 Hours (Table)











  03/25/21 03/26/21 03/26/21 Range/Units





  08:28 12:49 13:25 


 


RBC     (4.30-5.90)  m/uL


 


Hgb     (13.0-17.5)  gm/dL


 


Hct     (39.0-53.0)  %


 


Neutrophils #     (1.3-7.7)  k/uL


 


Lymphocytes #     (1.0-4.8)  k/uL


 


PT     (9.0-12.0)  sec


 


INR     (<1.2)  


 


ABG pH   7.30 L   (7.35-7.45)  


 


ABG pCO2   47 H   (35-45)  mmHg


 


ABG pO2   266 H  324 H  ()  mmHg


 


ABG Total CO2    25 H  (19-24)  mmol/L


 


ABG O2 Saturation   100.0 H  100.0 H  (94-97)  %


 


ABG Hematocrit   25 L  25 L  (34.0-46.0)  %


 


ABG Sodium    134 L  (135-146)  mmol/L


 


ABG Ionized Calcium   4.3 L  4.2 L  (4.5-5.3)  mg/dL


 


ABG Glucose   276 H  311 H  (75-99)  mg/dL


 


Hemoglobin   8.2 L  8.0 L  (13.0-17.5)  gm/dL


 


Sodium     (137-145)  mmol/L


 


Potassium     (3.5-5.1)  mmol/L


 


Chloride     ()  mmol/L


 


Creatinine     (0.66-1.25)  mg/dL


 


Glucose     (74-99)  mg/dL


 


POC Glucose (mg/dL)     (75-99)  mg/dL


 


Calcium     (8.4-10.2)  mg/dL


 


Magnesium     (1.6-2.3)  mg/dL


 


AST     (17-59)  U/L


 


Alkaline Phosphatase     ()  U/L


 


Total Protein     (6.3-8.2)  g/dL


 


Albumin     (3.5-5.0)  g/dL


 


Arterial Blood Glucose   276 H  311 H  (75-99)  mg/dL


 


Crossmatch  See Detail    














  03/26/21 03/26/21 03/26/21 Range/Units





  14:06 14:33 16:43 


 


RBC     (4.30-5.90)  m/uL


 


Hgb     (13.0-17.5)  gm/dL


 


Hct     (39.0-53.0)  %


 


Neutrophils #     (1.3-7.7)  k/uL


 


Lymphocytes #     (1.0-4.8)  k/uL


 


PT     (9.0-12.0)  sec


 


INR     (<1.2)  


 


ABG pH     (7.35-7.45)  


 


ABG pCO2     (35-45)  mmHg


 


ABG pO2  328 H  294 H   ()  mmHg


 


ABG Total CO2  25 H  25 H   (19-24)  mmol/L


 


ABG O2 Saturation  100.0 H  100.0 H   (94-97)  %


 


ABG Hematocrit  24 L  24 L   (34.0-46.0)  %


 


ABG Sodium     (135-146)  mmol/L


 


ABG Ionized Calcium  4.3 L  4.3 L   (4.5-5.3)  mg/dL


 


ABG Glucose  301 H  280 H   (75-99)  mg/dL


 


Hemoglobin  7.8 L  7.8 L   (13.0-17.5)  gm/dL


 


Sodium     (137-145)  mmol/L


 


Potassium     (3.5-5.1)  mmol/L


 


Chloride     ()  mmol/L


 


Creatinine     (0.66-1.25)  mg/dL


 


Glucose     (74-99)  mg/dL


 


POC Glucose (mg/dL)    183 H  (75-99)  mg/dL


 


Calcium     (8.4-10.2)  mg/dL


 


Magnesium     (1.6-2.3)  mg/dL


 


AST     (17-59)  U/L


 


Alkaline Phosphatase     ()  U/L


 


Total Protein     (6.3-8.2)  g/dL


 


Albumin     (3.5-5.0)  g/dL


 


Arterial Blood Glucose  301 H  280 H   (75-99)  mg/dL


 


Crossmatch     














  03/26/21 03/26/21 03/26/21 Range/Units





  16:45 16:45 16:45 


 


RBC  3.06 L    (4.30-5.90)  m/uL


 


Hgb  9.8 L D    (13.0-17.5)  gm/dL


 


Hct  28.3 L    (39.0-53.0)  %


 


Neutrophils #     (1.3-7.7)  k/uL


 


Lymphocytes #     (1.0-4.8)  k/uL


 


PT   12.2 H   (9.0-12.0)  sec


 


INR   1.2 H   (<1.2)  


 


ABG pH     (7.35-7.45)  


 


ABG pCO2     (35-45)  mmHg


 


ABG pO2     ()  mmHg


 


ABG Total CO2     (19-24)  mmol/L


 


ABG O2 Saturation     (94-97)  %


 


ABG Hematocrit     (34.0-46.0)  %


 


ABG Sodium     (135-146)  mmol/L


 


ABG Ionized Calcium     (4.5-5.3)  mg/dL


 


ABG Glucose     (75-99)  mg/dL


 


Hemoglobin     (13.0-17.5)  gm/dL


 


Sodium    136 L  (137-145)  mmol/L


 


Potassium     (3.5-5.1)  mmol/L


 


Chloride     ()  mmol/L


 


Creatinine    0.63 L  (0.66-1.25)  mg/dL


 


Glucose    173 H  (74-99)  mg/dL


 


POC Glucose (mg/dL)     (75-99)  mg/dL


 


Calcium    7.9 L  (8.4-10.2)  mg/dL


 


Magnesium    2.7 H  (1.6-2.3)  mg/dL


 


AST     (17-59)  U/L


 


Alkaline Phosphatase    34 L  ()  U/L


 


Total Protein    4.3 L  (6.3-8.2)  g/dL


 


Albumin    2.4 L  (3.5-5.0)  g/dL


 


Arterial Blood Glucose     (75-99)  mg/dL


 


Crossmatch     














  03/26/21 03/26/21 03/26/21 Range/Units





  17:09 17:27 18:13 


 


RBC     (4.30-5.90)  m/uL


 


Hgb     (13.0-17.5)  gm/dL


 


Hct     (39.0-53.0)  %


 


Neutrophils #     (1.3-7.7)  k/uL


 


Lymphocytes #     (1.0-4.8)  k/uL


 


PT     (9.0-12.0)  sec


 


INR     (<1.2)  


 


ABG pH     (7.35-7.45)  


 


ABG pCO2     (35-45)  mmHg


 


ABG pO2   155 H   ()  mmHg


 


ABG Total CO2   26 H   (19-24)  mmol/L


 


ABG O2 Saturation   99.2 H   (94-97)  %


 


ABG Hematocrit     (34.0-46.0)  %


 


ABG Sodium     (135-146)  mmol/L


 


ABG Ionized Calcium     (4.5-5.3)  mg/dL


 


ABG Glucose     (75-99)  mg/dL


 


Hemoglobin     (13.0-17.5)  gm/dL


 


Sodium     (137-145)  mmol/L


 


Potassium     (3.5-5.1)  mmol/L


 


Chloride     ()  mmol/L


 


Creatinine     (0.66-1.25)  mg/dL


 


Glucose     (74-99)  mg/dL


 


POC Glucose (mg/dL)  171 H   132 H  (75-99)  mg/dL


 


Calcium     (8.4-10.2)  mg/dL


 


Magnesium     (1.6-2.3)  mg/dL


 


AST     (17-59)  U/L


 


Alkaline Phosphatase     ()  U/L


 


Total Protein     (6.3-8.2)  g/dL


 


Albumin     (3.5-5.0)  g/dL


 


Arterial Blood Glucose     (75-99)  mg/dL


 


Crossmatch     














  03/26/21 03/26/21 03/26/21 Range/Units





  18:56 18:57 20:06 


 


RBC   2.89 L   (4.30-5.90)  m/uL


 


Hgb   9.1 L   (13.0-17.5)  gm/dL


 


Hct   26.5 L   (39.0-53.0)  %


 


Neutrophils #     (1.3-7.7)  k/uL


 


Lymphocytes #   0.4 L   (1.0-4.8)  k/uL


 


PT     (9.0-12.0)  sec


 


INR     (<1.2)  


 


ABG pH     (7.35-7.45)  


 


ABG pCO2     (35-45)  mmHg


 


ABG pO2     ()  mmHg


 


ABG Total CO2     (19-24)  mmol/L


 


ABG O2 Saturation     (94-97)  %


 


ABG Hematocrit     (34.0-46.0)  %


 


ABG Sodium     (135-146)  mmol/L


 


ABG Ionized Calcium     (4.5-5.3)  mg/dL


 


ABG Glucose     (75-99)  mg/dL


 


Hemoglobin     (13.0-17.5)  gm/dL


 


Sodium     (137-145)  mmol/L


 


Potassium     (3.5-5.1)  mmol/L


 


Chloride     ()  mmol/L


 


Creatinine     (0.66-1.25)  mg/dL


 


Glucose     (74-99)  mg/dL


 


POC Glucose (mg/dL)  133 H   161 H  (75-99)  mg/dL


 


Calcium     (8.4-10.2)  mg/dL


 


Magnesium     (1.6-2.3)  mg/dL


 


AST     (17-59)  U/L


 


Alkaline Phosphatase     ()  U/L


 


Total Protein     (6.3-8.2)  g/dL


 


Albumin     (3.5-5.0)  g/dL


 


Arterial Blood Glucose     (75-99)  mg/dL


 


Crossmatch     














  03/26/21 03/26/21 03/26/21 Range/Units





  20:55 20:57 20:59 


 


RBC   2.71 L   (4.30-5.90)  m/uL


 


Hgb   8.4 L   (13.0-17.5)  gm/dL


 


Hct   24.8 L   (39.0-53.0)  %


 


Neutrophils #   7.9 H   (1.3-7.7)  k/uL


 


Lymphocytes #   0.4 L   (1.0-4.8)  k/uL


 


PT     (9.0-12.0)  sec


 


INR     (<1.2)  


 


ABG pH     (7.35-7.45)  


 


ABG pCO2     (35-45)  mmHg


 


ABG pO2     ()  mmHg


 


ABG Total CO2  26 H    (19-24)  mmol/L


 


ABG O2 Saturation  98.6 H    (94-97)  %


 


ABG Hematocrit     (34.0-46.0)  %


 


ABG Sodium     (135-146)  mmol/L


 


ABG Ionized Calcium     (4.5-5.3)  mg/dL


 


ABG Glucose     (75-99)  mg/dL


 


Hemoglobin     (13.0-17.5)  gm/dL


 


Sodium     (137-145)  mmol/L


 


Potassium     (3.5-5.1)  mmol/L


 


Chloride     ()  mmol/L


 


Creatinine     (0.66-1.25)  mg/dL


 


Glucose     (74-99)  mg/dL


 


POC Glucose (mg/dL)    152 H  (75-99)  mg/dL


 


Calcium     (8.4-10.2)  mg/dL


 


Magnesium     (1.6-2.3)  mg/dL


 


AST     (17-59)  U/L


 


Alkaline Phosphatase     ()  U/L


 


Total Protein     (6.3-8.2)  g/dL


 


Albumin     (3.5-5.0)  g/dL


 


Arterial Blood Glucose     (75-99)  mg/dL


 


Crossmatch     














  03/26/21 03/26/21 03/27/21 Range/Units





  22:03 23:02 00:06 


 


RBC     (4.30-5.90)  m/uL


 


Hgb     (13.0-17.5)  gm/dL


 


Hct     (39.0-53.0)  %


 


Neutrophils #     (1.3-7.7)  k/uL


 


Lymphocytes #     (1.0-4.8)  k/uL


 


PT     (9.0-12.0)  sec


 


INR     (<1.2)  


 


ABG pH     (7.35-7.45)  


 


ABG pCO2     (35-45)  mmHg


 


ABG pO2     ()  mmHg


 


ABG Total CO2     (19-24)  mmol/L


 


ABG O2 Saturation     (94-97)  %


 


ABG Hematocrit     (34.0-46.0)  %


 


ABG Sodium     (135-146)  mmol/L


 


ABG Ionized Calcium     (4.5-5.3)  mg/dL


 


ABG Glucose     (75-99)  mg/dL


 


Hemoglobin     (13.0-17.5)  gm/dL


 


Sodium     (137-145)  mmol/L


 


Potassium     (3.5-5.1)  mmol/L


 


Chloride     ()  mmol/L


 


Creatinine     (0.66-1.25)  mg/dL


 


Glucose     (74-99)  mg/dL


 


POC Glucose (mg/dL)  167 H  144 H  131 H  (75-99)  mg/dL


 


Calcium     (8.4-10.2)  mg/dL


 


Magnesium     (1.6-2.3)  mg/dL


 


AST     (17-59)  U/L


 


Alkaline Phosphatase     ()  U/L


 


Total Protein     (6.3-8.2)  g/dL


 


Albumin     (3.5-5.0)  g/dL


 


Arterial Blood Glucose     (75-99)  mg/dL


 


Crossmatch     














  03/27/21 03/27/21 03/27/21 Range/Units





  01:09 04:05 04:17 


 


RBC    2.74 L  (4.30-5.90)  m/uL


 


Hgb    8.8 L  (13.0-17.5)  gm/dL


 


Hct    24.9 L  (39.0-53.0)  %


 


Neutrophils #    8.0 H  (1.3-7.7)  k/uL


 


Lymphocytes #    0.8 L  (1.0-4.8)  k/uL


 


PT     (9.0-12.0)  sec


 


INR     (<1.2)  


 


ABG pH     (7.35-7.45)  


 


ABG pCO2     (35-45)  mmHg


 


ABG pO2     ()  mmHg


 


ABG Total CO2     (19-24)  mmol/L


 


ABG O2 Saturation     (94-97)  %


 


ABG Hematocrit     (34.0-46.0)  %


 


ABG Sodium     (135-146)  mmol/L


 


ABG Ionized Calcium     (4.5-5.3)  mg/dL


 


ABG Glucose     (75-99)  mg/dL


 


Hemoglobin     (13.0-17.5)  gm/dL


 


Sodium     (137-145)  mmol/L


 


Potassium     (3.5-5.1)  mmol/L


 


Chloride     ()  mmol/L


 


Creatinine     (0.66-1.25)  mg/dL


 


Glucose     (74-99)  mg/dL


 


POC Glucose (mg/dL)  133 H  123 H   (75-99)  mg/dL


 


Calcium     (8.4-10.2)  mg/dL


 


Magnesium     (1.6-2.3)  mg/dL


 


AST     (17-59)  U/L


 


Alkaline Phosphatase     ()  U/L


 


Total Protein     (6.3-8.2)  g/dL


 


Albumin     (3.5-5.0)  g/dL


 


Arterial Blood Glucose     (75-99)  mg/dL


 


Crossmatch     














  03/27/21 03/27/21 03/27/21 Range/Units





  04:17 05:11 07:07 


 


RBC     (4.30-5.90)  m/uL


 


Hgb     (13.0-17.5)  gm/dL


 


Hct     (39.0-53.0)  %


 


Neutrophils #     (1.3-7.7)  k/uL


 


Lymphocytes #     (1.0-4.8)  k/uL


 


PT     (9.0-12.0)  sec


 


INR     (<1.2)  


 


ABG pH     (7.35-7.45)  


 


ABG pCO2     (35-45)  mmHg


 


ABG pO2     ()  mmHg


 


ABG Total CO2     (19-24)  mmol/L


 


ABG O2 Saturation     (94-97)  %


 


ABG Hematocrit     (34.0-46.0)  %


 


ABG Sodium     (135-146)  mmol/L


 


ABG Ionized Calcium     (4.5-5.3)  mg/dL


 


ABG Glucose     (75-99)  mg/dL


 


Hemoglobin     (13.0-17.5)  gm/dL


 


Sodium  135 L    (137-145)  mmol/L


 


Potassium  5.4 H    (3.5-5.1)  mmol/L


 


Chloride  108 H    ()  mmol/L


 


Creatinine     (0.66-1.25)  mg/dL


 


Glucose  117 H    (74-99)  mg/dL


 


POC Glucose (mg/dL)   129 H  171 H  (75-99)  mg/dL


 


Calcium  7.6 L    (8.4-10.2)  mg/dL


 


Magnesium     (1.6-2.3)  mg/dL


 


AST  77 H    (17-59)  U/L


 


Alkaline Phosphatase     ()  U/L


 


Total Protein  4.7 L    (6.3-8.2)  g/dL


 


Albumin  2.8 L    (3.5-5.0)  g/dL


 


Arterial Blood Glucose     (75-99)  mg/dL


 


Crossmatch     














  03/27/21 03/27/21 03/27/21 Range/Units





  08:07 09:19 10:15 


 


RBC     (4.30-5.90)  m/uL


 


Hgb     (13.0-17.5)  gm/dL


 


Hct     (39.0-53.0)  %


 


Neutrophils #     (1.3-7.7)  k/uL


 


Lymphocytes #     (1.0-4.8)  k/uL


 


PT     (9.0-12.0)  sec


 


INR     (<1.2)  


 


ABG pH     (7.35-7.45)  


 


ABG pCO2     (35-45)  mmHg


 


ABG pO2     ()  mmHg


 


ABG Total CO2     (19-24)  mmol/L


 


ABG O2 Saturation     (94-97)  %


 


ABG Hematocrit     (34.0-46.0)  %


 


ABG Sodium     (135-146)  mmol/L


 


ABG Ionized Calcium     (4.5-5.3)  mg/dL


 


ABG Glucose     (75-99)  mg/dL


 


Hemoglobin     (13.0-17.5)  gm/dL


 


Sodium     (137-145)  mmol/L


 


Potassium     (3.5-5.1)  mmol/L


 


Chloride     ()  mmol/L


 


Creatinine     (0.66-1.25)  mg/dL


 


Glucose     (74-99)  mg/dL


 


POC Glucose (mg/dL)  171 H  247 H  206 H  (75-99)  mg/dL


 


Calcium     (8.4-10.2)  mg/dL


 


Magnesium     (1.6-2.3)  mg/dL


 


AST     (17-59)  U/L


 


Alkaline Phosphatase     ()  U/L


 


Total Protein     (6.3-8.2)  g/dL


 


Albumin     (3.5-5.0)  g/dL


 


Arterial Blood Glucose     (75-99)  mg/dL


 


Crossmatch     














  03/27/21 03/27/21 03/27/21 Range/Units





  11:07 13:19 14:02 


 


RBC     (4.30-5.90)  m/uL


 


Hgb     (13.0-17.5)  gm/dL


 


Hct     (39.0-53.0)  %


 


Neutrophils #     (1.3-7.7)  k/uL


 


Lymphocytes #     (1.0-4.8)  k/uL


 


PT     (9.0-12.0)  sec


 


INR     (<1.2)  


 


ABG pH     (7.35-7.45)  


 


ABG pCO2     (35-45)  mmHg


 


ABG pO2     ()  mmHg


 


ABG Total CO2     (19-24)  mmol/L


 


ABG O2 Saturation     (94-97)  %


 


ABG Hematocrit     (34.0-46.0)  %


 


ABG Sodium     (135-146)  mmol/L


 


ABG Ionized Calcium     (4.5-5.3)  mg/dL


 


ABG Glucose     (75-99)  mg/dL


 


Hemoglobin     (13.0-17.5)  gm/dL


 


Sodium     (137-145)  mmol/L


 


Potassium     (3.5-5.1)  mmol/L


 


Chloride     ()  mmol/L


 


Creatinine     (0.66-1.25)  mg/dL


 


Glucose     (74-99)  mg/dL


 


POC Glucose (mg/dL)  173 H  146 H  154 H  (75-99)  mg/dL


 


Calcium     (8.4-10.2)  mg/dL


 


Magnesium     (1.6-2.3)  mg/dL


 


AST     (17-59)  U/L


 


Alkaline Phosphatase     ()  U/L


 


Total Protein     (6.3-8.2)  g/dL


 


Albumin     (3.5-5.0)  g/dL


 


Arterial Blood Glucose     (75-99)  mg/dL


 


Crossmatch     














  03/27/21 Range/Units





  15:17 


 


RBC   (4.30-5.90)  m/uL


 


Hgb   (13.0-17.5)  gm/dL


 


Hct   (39.0-53.0)  %


 


Neutrophils #   (1.3-7.7)  k/uL


 


Lymphocytes #   (1.0-4.8)  k/uL


 


PT   (9.0-12.0)  sec


 


INR   (<1.2)  


 


ABG pH   (7.35-7.45)  


 


ABG pCO2   (35-45)  mmHg


 


ABG pO2   ()  mmHg


 


ABG Total CO2   (19-24)  mmol/L


 


ABG O2 Saturation   (94-97)  %


 


ABG Hematocrit   (34.0-46.0)  %


 


ABG Sodium   (135-146)  mmol/L


 


ABG Ionized Calcium   (4.5-5.3)  mg/dL


 


ABG Glucose   (75-99)  mg/dL


 


Hemoglobin   (13.0-17.5)  gm/dL


 


Sodium   (137-145)  mmol/L


 


Potassium   (3.5-5.1)  mmol/L


 


Chloride   ()  mmol/L


 


Creatinine   (0.66-1.25)  mg/dL


 


Glucose   (74-99)  mg/dL


 


POC Glucose (mg/dL)  138 H  (75-99)  mg/dL


 


Calcium   (8.4-10.2)  mg/dL


 


Magnesium   (1.6-2.3)  mg/dL


 


AST   (17-59)  U/L


 


Alkaline Phosphatase   ()  U/L


 


Total Protein   (6.3-8.2)  g/dL


 


Albumin   (3.5-5.0)  g/dL


 


Arterial Blood Glucose   (75-99)  mg/dL


 


Crossmatch   














Assessment and Plan


Plan: 





NSTEMI


-Troponin 3.120. Troponins trended upward with initial troponin is 3.120  with 

repeats of 6.080 and 7.840.


-EKG showing sinus tachycardia at 105 bpm with T-wave inversion in leads III and

aVF, no signs of ST elevation.  No previous EKGs available for comparison.


-Continue heparin infusion, pharmacy to dose.


-S/P CABGX4 3/26


-Telemetry monitoring.


-Close monitoring of I's and O's.


-Continue daily aspirin, atorvastatin, and metoprolol.


-Lipid profile revealed elevation of triglycerides 200, cholesterol 241, LDL 

164, HDL of 37.  Atorvastatin increased to 80 mg nightly.





Insulin-dependent diabetes mellitus type II


-Glycemic protocol with insulin drip


-Will resume home NovoLog sliding scale and continuation of Lantus 20 units 

daily once more stable and able to eat..


-Hemoglobin A1c = 8.2%





CODE STATUS: Full code


DVT prophylaxis: Heparin


Discussed with: Patient


Anticipated discharge date: Clinical course to determine


Anticipated discharge place: Home

## 2021-03-27 NOTE — P.PN
Subjective


Progress Note Date: 03/27/21


Principal diagnosis: 





Coronary artery disease, mitral regurgitation.  Previous medical history of 

insulin-dependent diabetes, family history of premature coronary artery disease,

EtOH use most days without history of withdrawal, and newly diagnosed 

hyperlipidemia and left carotid stenosis





POD #1 coronary artery bypass grafting 4 vessels with the left internal mammary

artery to the left anterior descending artery, left radial artery to the distal 

circumflex, reverse saphenous vein graft from the aorta to the first diagonal 

artery, reverse saphenous vein graft from the aorta to the right coronary 

artery, mitral valve repair with #28 [employer] AnnuloFlex ring, exclusion of 

the left atrial appendage with a 35 mm AtriClip, graft flow measurements using 

the Medistim system, left radial artery and left endoscopic vein harvest, 

intraoperative transesophageal echocardiogram and epi-aortic scanning.





Postoperative acute blood loss anemia, expected given hemodilution and ca

rdiopulmonary bypass pump





Patient currently sitting up in bed in a recliner in the intensive care unit no 

acute distress.  He was successfully extubated last night at 21:15.  Complains 

of postoperative chest pain with difficulty taking a deep breath, actively 

attempting incentive spirometry.  Currently in sinus rhythm.  Did have a bit of 

hypotension this morning when getting up to chair due to expected fluid shifts 

post cardiac surgery, was initiated on IV levo and given volume resuscitation, c

urrently normotensive.  Did complain of seeing some spots this morning when 

getting up to chair, currently resolved.  Mediastinal chest tubes, left pleural 

chest tube, right internal jugular [place], right radial arterial line, left

arm MAKAYLA drain all remain present.  No other new concerns.





Objective





- Vital Signs


Vital signs: 


                                   Vital Signs











Temp  99.5 F   03/27/21 06:00


 


Pulse  75   03/27/21 07:00


 


Resp  21   03/27/21 07:00


 


BP  96/63   03/27/21 06:00


 


Pulse Ox  93 L  03/27/21 07:00








                                 Intake & Output











 03/26/21 03/27/21 03/27/21





 18:59 06:59 18:59


 


Intake Total 063.093 1266.583 313.57


 


Output Total 4340 852 20


 


Balance -3865.307 910.583 293.57


 


Weight  89.9 kg 


 


Intake:   


 


  IV 72 918 59


 


    .9NS Pressure Bag 9 108 9


 


    Cardiac Output 10 160 


 


    Sodium Chloride 0.9% 1,  550 50





    000 ml @ 50 mls/hr IV .   





    Q20H [facility] Rx#:[id number]   


 


    ceFAZolin 2 gm In Sodium  100 





    Chloride 0.9% 50 ml @ 100   





    mls/hr IVPB Q8HR [facility] Rx#   





    :[id number]   


 


  Intake, IV Titration 402.693 844.583 254.57





  Amount   


 


    Albumin Human 5% 250 ml @ 250 500 250





    0 mls/hr IVPB .K-MED   





    [facility] Rx#:[id number]   


 


    Insulin Regular 100 unit 2.693 23.533 4.57





    In Sodium Chloride 0.9%   





    100 ml @ Per Protocol IV   





    .Q0M [facility] Rx#:[id number]   


 


    Milrinone-D5w Pmx 20 mg  54.805 





    In Dextrose/Water 1 100ml   





    .bag @ 0.25 MCG/KG/MIN 6.   





    435 mls/hr IV .[id number]   





    [facility] Rx#:[id number]   


 


    Norepinephrine 4 mg In  216.245 





    Sodium Chloride 0.9% 250   





    ml @ 0.05 MCG/KG/MIN 16.   





    345 mls/hr IV .[id number]   





    [facility] Rx#:[id number]   


 


    Sodium Chloride 0.9% 1, 100 50 





    000 ml @ 50 mls/hr IV .   





    Q20H [facility] Rx#:[id number]   


 


    ceFAZolin 2 gm In Sodium 50  





    Chloride 0.9% 50 ml @ 100   





    mls/hr IVPB Q8HR [facility] Rx#   





    :[id number]   


 


Output:   


 


  Chest Tube Drainage 360 422 0


 


    Left Pleural Chest tube 120 182 0


 


    Mediastinal Chest Tubes X 240 240 0





    2   


 


  Drainage  0 0


 


    Left Wrist  0 0


 


  Urine 1980 430 20


 


  Estimated Blood Loss 2000  


 


Other:   


 


  Voiding Method  Indwelling Catheter 








                       ABP, PAP, CO, CI - Last Documented











Arterial Blood Pressure        106/62


 


Pulmonary Artery Pressure      46/22


 


Cardiac Output                 5.4


 


Cardiac Index                  2.7

















- Exam





CONSTITUTIONAL: Appears comfortable, cooperative, no acute distress


RESPIRATORY:  Lungs sounds diminished bilaterally.  Respirations even, 

nonlabored.  Currently on 2 L nasal cannula with oxygen saturation 99%.  Able to

achieve 500-600 mL on incentive spirometry.  Strong cough.  


CARDIOVASCULAR:  S1, S2 present.  Regular rate and rhythm, sinus rhythm on 

telemetry.  Sternum stable.   Palpable peripheral pulses bilaterally.  

Generalized edema present.  No calf pain or tenderness noted.  Heart hugger in 

place with patient demonstrating appropriate use.  Antiembolism stockings, SCDs 

present.


GASTROINTESTINAL:  Abdomen soft, nontender, nondistended.  Hypoactive bowel 

sounds present 4 quadrants.  Tolerating clear liquid diet.  Positive belching, 

negative flatus.


GENITOURINARY:  Ramirez present draining clear, yellow urine.  Output overnight 

20-30 mL per hour


INTEGUMENTARY:  Skin is warm and dry with evidence of good perfusion.  Anterior 

chest incision well approximated and covered with dry intact dressing.  EVH site

well approximated without redness or drainage.  Left radial artery harvest site 

well approximated, MAKAYLA drain present with minimal drainage, patient denies n

umbness or tingling, able to wiggle all fingers and  appropriately, good cap

refill.


NEUROLOGIC:  Cranial nerves II through XII intact


MUSKULOSKELETAL:  Able to move all extremities, strength equal bilaterally


PSYCHIATRIC:  Alert and oriented to person place and time, appropriate affect, 

intact judgment and insight


INVASIVE LINES AND TUBES:  Mediastinal/left/right pleural chest tubes present a

nd connected to wall suction, no air leaks present.  Mediastinal tube with 170 

mL serosanguineous drainage overnight, 520 mL since surgery.  Left pleural chest

tube with 125 mL serosanguineous drainage overnight, 320 mL since surgery.  A/V 

epicardial pacemaker wires present, connected to generator, backup rate 50 bpm. 

Right internal jugular [place], right radial arterial line present.  Last C

O/CI 5.4/2.7, PA 39/18, CVP 18.








- Labs


CBC & Chem 7: 


                                 03/27/21 04:17





                                 03/27/21 04:17


Labs: 


                  Abnormal Lab Results - Last 24 Hours (Table)











  03/25/21 03/26/21 03/26/21 Range/Units





  08:28 08:38 10:39 


 


RBC     (4.30-5.90)  m/uL


 


Hgb     (13.0-17.5)  gm/dL


 


Hct     (39.0-53.0)  %


 


Neutrophils #     (1.3-7.7)  k/uL


 


Lymphocytes #     (1.0-4.8)  k/uL


 


PT     (9.0-12.0)  sec


 


INR     (<1.2)  


 


ABG pH     (7.35-7.45)  


 


ABG pCO2     (35-45)  mmHg


 


ABG pO2   307 H  121 H  ()  mmHg


 


ABG Total CO2   25 H  25 H  (19-24)  mmol/L


 


ABG O2 Saturation   100.0 H  99.2 H  (94-97)  %


 


ABG Hematocrit   33 L  30 L  (34.0-46.0)  %


 


ABG Sodium     (135-146)  mmol/L


 


ABG Potassium     (3.4-4.5)  mmol/L


 


ABG Ionized Calcium     (4.5-5.3)  mg/dL


 


ABG Glucose   154 H  171 H  (75-99)  mg/dL


 


Hemoglobin   10.8 L  9.9 L  (13.0-17.5)  gm/dL


 


Sodium     (137-145)  mmol/L


 


Potassium     (3.5-5.1)  mmol/L


 


Chloride     ()  mmol/L


 


Creatinine     (0.66-1.25)  mg/dL


 


Glucose     (74-99)  mg/dL


 


POC Glucose (mg/dL)     (75-99)  mg/dL


 


Calcium     (8.4-10.2)  mg/dL


 


Magnesium     (1.6-2.3)  mg/dL


 


AST     (17-59)  U/L


 


Alkaline Phosphatase     ()  U/L


 


Total Protein     (6.3-8.2)  g/dL


 


Albumin     (3.5-5.0)  g/dL


 


Arterial Blood Potassium     (3.4-4.5)  mmol/L


 


Arterial Blood Glucose   154 H  171 H  (75-99)  mg/dL


 


Crossmatch  See Detail    














  03/26/21 03/26/21 03/26/21 Range/Units





  11:38 11:52 12:25 


 


RBC     (4.30-5.90)  m/uL


 


Hgb     (13.0-17.5)  gm/dL


 


Hct     (39.0-53.0)  %


 


Neutrophils #     (1.3-7.7)  k/uL


 


Lymphocytes #     (1.0-4.8)  k/uL


 


PT     (9.0-12.0)  sec


 


INR     (<1.2)  


 


ABG pH    7.34 L  (7.35-7.45)  


 


ABG pCO2     (35-45)  mmHg


 


ABG pO2  142 H  371 H  292 H  ()  mmHg


 


ABG Total CO2  25 H  25 H  25 H  (19-24)  mmol/L


 


ABG O2 Saturation  99.6 H  100.0 H  100.0 H  (94-97)  %


 


ABG Hematocrit  31 L  28 L  25 L  (34.0-46.0)  %


 


ABG Sodium    133 L  (135-146)  mmol/L


 


ABG Potassium    5.2 H  (3.4-4.5)  mmol/L


 


ABG Ionized Calcium   4.3 L  4.2 L  (4.5-5.3)  mg/dL


 


ABG Glucose  183 H  181 H  260 H  (75-99)  mg/dL


 


Hemoglobin  10.1 L  9.3 L  8.2 L  (13.0-17.5)  gm/dL


 


Sodium     (137-145)  mmol/L


 


Potassium     (3.5-5.1)  mmol/L


 


Chloride     ()  mmol/L


 


Creatinine     (0.66-1.25)  mg/dL


 


Glucose     (74-99)  mg/dL


 


POC Glucose (mg/dL)     (75-99)  mg/dL


 


Calcium     (8.4-10.2)  mg/dL


 


Magnesium     (1.6-2.3)  mg/dL


 


AST     (17-59)  U/L


 


Alkaline Phosphatase     ()  U/L


 


Total Protein     (6.3-8.2)  g/dL


 


Albumin     (3.5-5.0)  g/dL


 


Arterial Blood Potassium    5.2 H  (3.4-4.5)  mmol/L


 


Arterial Blood Glucose  183 H  181 H  260 H  (75-99)  mg/dL


 


Crossmatch     














  03/26/21 03/26/21 03/26/21 Range/Units





  12:49 13:25 14:06 


 


RBC     (4.30-5.90)  m/uL


 


Hgb     (13.0-17.5)  gm/dL


 


Hct     (39.0-53.0)  %


 


Neutrophils #     (1.3-7.7)  k/uL


 


Lymphocytes #     (1.0-4.8)  k/uL


 


PT     (9.0-12.0)  sec


 


INR     (<1.2)  


 


ABG pH  7.30 L    (7.35-7.45)  


 


ABG pCO2  47 H    (35-45)  mmHg


 


ABG pO2  266 H  324 H  328 H  ()  mmHg


 


ABG Total CO2   25 H  25 H  (19-24)  mmol/L


 


ABG O2 Saturation  100.0 H  100.0 H  100.0 H  (94-97)  %


 


ABG Hematocrit  25 L  25 L  24 L  (34.0-46.0)  %


 


ABG Sodium   134 L   (135-146)  mmol/L


 


ABG Potassium     (3.4-4.5)  mmol/L


 


ABG Ionized Calcium  4.3 L  4.2 L  4.3 L  (4.5-5.3)  mg/dL


 


ABG Glucose  276 H  311 H  301 H  (75-99)  mg/dL


 


Hemoglobin  8.2 L  8.0 L  7.8 L  (13.0-17.5)  gm/dL


 


Sodium     (137-145)  mmol/L


 


Potassium     (3.5-5.1)  mmol/L


 


Chloride     ()  mmol/L


 


Creatinine     (0.66-1.25)  mg/dL


 


Glucose     (74-99)  mg/dL


 


POC Glucose (mg/dL)     (75-99)  mg/dL


 


Calcium     (8.4-10.2)  mg/dL


 


Magnesium     (1.6-2.3)  mg/dL


 


AST     (17-59)  U/L


 


Alkaline Phosphatase     ()  U/L


 


Total Protein     (6.3-8.2)  g/dL


 


Albumin     (3.5-5.0)  g/dL


 


Arterial Blood Potassium     (3.4-4.5)  mmol/L


 


Arterial Blood Glucose  276 H  311 H  301 H  (75-99)  mg/dL


 


Crossmatch     














  03/26/21 03/26/21 03/26/21 Range/Units





  14:33 16:43 16:45 


 


RBC    3.06 L  (4.30-5.90)  m/uL


 


Hgb    9.8 L D  (13.0-17.5)  gm/dL


 


Hct    28.3 L  (39.0-53.0)  %


 


Neutrophils #     (1.3-7.7)  k/uL


 


Lymphocytes #     (1.0-4.8)  k/uL


 


PT     (9.0-12.0)  sec


 


INR     (<1.2)  


 


ABG pH     (7.35-7.45)  


 


ABG pCO2     (35-45)  mmHg


 


ABG pO2  294 H    ()  mmHg


 


ABG Total CO2  25 H    (19-24)  mmol/L


 


ABG O2 Saturation  100.0 H    (94-97)  %


 


ABG Hematocrit  24 L    (34.0-46.0)  %


 


ABG Sodium     (135-146)  mmol/L


 


ABG Potassium     (3.4-4.5)  mmol/L


 


ABG Ionized Calcium  4.3 L    (4.5-5.3)  mg/dL


 


ABG Glucose  280 H    (75-99)  mg/dL


 


Hemoglobin  7.8 L    (13.0-17.5)  gm/dL


 


Sodium     (137-145)  mmol/L


 


Potassium     (3.5-5.1)  mmol/L


 


Chloride     ()  mmol/L


 


Creatinine     (0.66-1.25)  mg/dL


 


Glucose     (74-99)  mg/dL


 


POC Glucose (mg/dL)   183 H   (75-99)  mg/dL


 


Calcium     (8.4-10.2)  mg/dL


 


Magnesium     (1.6-2.3)  mg/dL


 


AST     (17-59)  U/L


 


Alkaline Phosphatase     ()  U/L


 


Total Protein     (6.3-8.2)  g/dL


 


Albumin     (3.5-5.0)  g/dL


 


Arterial Blood Potassium     (3.4-4.5)  mmol/L


 


Arterial Blood Glucose  280 H    (75-99)  mg/dL


 


Crossmatch     














  03/26/21 03/26/21 03/26/21 Range/Units





  16:45 16:45 17:09 


 


RBC     (4.30-5.90)  m/uL


 


Hgb     (13.0-17.5)  gm/dL


 


Hct     (39.0-53.0)  %


 


Neutrophils #     (1.3-7.7)  k/uL


 


Lymphocytes #     (1.0-4.8)  k/uL


 


PT  12.2 H    (9.0-12.0)  sec


 


INR  1.2 H    (<1.2)  


 


ABG pH     (7.35-7.45)  


 


ABG pCO2     (35-45)  mmHg


 


ABG pO2     ()  mmHg


 


ABG Total CO2     (19-24)  mmol/L


 


ABG O2 Saturation     (94-97)  %


 


ABG Hematocrit     (34.0-46.0)  %


 


ABG Sodium     (135-146)  mmol/L


 


ABG Potassium     (3.4-4.5)  mmol/L


 


ABG Ionized Calcium     (4.5-5.3)  mg/dL


 


ABG Glucose     (75-99)  mg/dL


 


Hemoglobin     (13.0-17.5)  gm/dL


 


Sodium   136 L   (137-145)  mmol/L


 


Potassium     (3.5-5.1)  mmol/L


 


Chloride     ()  mmol/L


 


Creatinine   0.63 L   (0.66-1.25)  mg/dL


 


Glucose   173 H   (74-99)  mg/dL


 


POC Glucose (mg/dL)    171 H  (75-99)  mg/dL


 


Calcium   7.9 L   (8.4-10.2)  mg/dL


 


Magnesium   2.7 H   (1.6-2.3)  mg/dL


 


AST     (17-59)  U/L


 


Alkaline Phosphatase   34 L   ()  U/L


 


Total Protein   4.3 L   (6.3-8.2)  g/dL


 


Albumin   2.4 L   (3.5-5.0)  g/dL


 


Arterial Blood Potassium     (3.4-4.5)  mmol/L


 


Arterial Blood Glucose     (75-99)  mg/dL


 


Crossmatch     














  03/26/21 03/26/21 03/26/21 Range/Units





  17:27 18:13 18:56 


 


RBC     (4.30-5.90)  m/uL


 


Hgb     (13.0-17.5)  gm/dL


 


Hct     (39.0-53.0)  %


 


Neutrophils #     (1.3-7.7)  k/uL


 


Lymphocytes #     (1.0-4.8)  k/uL


 


PT     (9.0-12.0)  sec


 


INR     (<1.2)  


 


ABG pH     (7.35-7.45)  


 


ABG pCO2     (35-45)  mmHg


 


ABG pO2  155 H    ()  mmHg


 


ABG Total CO2  26 H    (19-24)  mmol/L


 


ABG O2 Saturation  99.2 H    (94-97)  %


 


ABG Hematocrit     (34.0-46.0)  %


 


ABG Sodium     (135-146)  mmol/L


 


ABG Potassium     (3.4-4.5)  mmol/L


 


ABG Ionized Calcium     (4.5-5.3)  mg/dL


 


ABG Glucose     (75-99)  mg/dL


 


Hemoglobin     (13.0-17.5)  gm/dL


 


Sodium     (137-145)  mmol/L


 


Potassium     (3.5-5.1)  mmol/L


 


Chloride     ()  mmol/L


 


Creatinine     (0.66-1.25)  mg/dL


 


Glucose     (74-99)  mg/dL


 


POC Glucose (mg/dL)   132 H  133 H  (75-99)  mg/dL


 


Calcium     (8.4-10.2)  mg/dL


 


Magnesium     (1.6-2.3)  mg/dL


 


AST     (17-59)  U/L


 


Alkaline Phosphatase     ()  U/L


 


Total Protein     (6.3-8.2)  g/dL


 


Albumin     (3.5-5.0)  g/dL


 


Arterial Blood Potassium     (3.4-4.5)  mmol/L


 


Arterial Blood Glucose     (75-99)  mg/dL


 


Crossmatch     














  03/26/21 03/26/21 03/26/21 Range/Units





  18:57 20:06 20:55 


 


RBC  2.89 L    (4.30-5.90)  m/uL


 


Hgb  9.1 L    (13.0-17.5)  gm/dL


 


Hct  26.5 L    (39.0-53.0)  %


 


Neutrophils #     (1.3-7.7)  k/uL


 


Lymphocytes #  0.4 L    (1.0-4.8)  k/uL


 


PT     (9.0-12.0)  sec


 


INR     (<1.2)  


 


ABG pH     (7.35-7.45)  


 


ABG pCO2     (35-45)  mmHg


 


ABG pO2     ()  mmHg


 


ABG Total CO2    26 H  (19-24)  mmol/L


 


ABG O2 Saturation    98.6 H  (94-97)  %


 


ABG Hematocrit     (34.0-46.0)  %


 


ABG Sodium     (135-146)  mmol/L


 


ABG Potassium     (3.4-4.5)  mmol/L


 


ABG Ionized Calcium     (4.5-5.3)  mg/dL


 


ABG Glucose     (75-99)  mg/dL


 


Hemoglobin     (13.0-17.5)  gm/dL


 


Sodium     (137-145)  mmol/L


 


Potassium     (3.5-5.1)  mmol/L


 


Chloride     ()  mmol/L


 


Creatinine     (0.66-1.25)  mg/dL


 


Glucose     (74-99)  mg/dL


 


POC Glucose (mg/dL)   161 H   (75-99)  mg/dL


 


Calcium     (8.4-10.2)  mg/dL


 


Magnesium     (1.6-2.3)  mg/dL


 


AST     (17-59)  U/L


 


Alkaline Phosphatase     ()  U/L


 


Total Protein     (6.3-8.2)  g/dL


 


Albumin     (3.5-5.0)  g/dL


 


Arterial Blood Potassium     (3.4-4.5)  mmol/L


 


Arterial Blood Glucose     (75-99)  mg/dL


 


Crossmatch     














  03/26/21 03/26/21 03/26/21 Range/Units





  20:57 20:59 22:03 


 


RBC  2.71 L    (4.30-5.90)  m/uL


 


Hgb  8.4 L    (13.0-17.5)  gm/dL


 


Hct  24.8 L    (39.0-53.0)  %


 


Neutrophils #  7.9 H    (1.3-7.7)  k/uL


 


Lymphocytes #  0.4 L    (1.0-4.8)  k/uL


 


PT     (9.0-12.0)  sec


 


INR     (<1.2)  


 


ABG pH     (7.35-7.45)  


 


ABG pCO2     (35-45)  mmHg


 


ABG pO2     ()  mmHg


 


ABG Total CO2     (19-24)  mmol/L


 


ABG O2 Saturation     (94-97)  %


 


ABG Hematocrit     (34.0-46.0)  %


 


ABG Sodium     (135-146)  mmol/L


 


ABG Potassium     (3.4-4.5)  mmol/L


 


ABG Ionized Calcium     (4.5-5.3)  mg/dL


 


ABG Glucose     (75-99)  mg/dL


 


Hemoglobin     (13.0-17.5)  gm/dL


 


Sodium     (137-145)  mmol/L


 


Potassium     (3.5-5.1)  mmol/L


 


Chloride     ()  mmol/L


 


Creatinine     (0.66-1.25)  mg/dL


 


Glucose     (74-99)  mg/dL


 


POC Glucose (mg/dL)   152 H  167 H  (75-99)  mg/dL


 


Calcium     (8.4-10.2)  mg/dL


 


Magnesium     (1.6-2.3)  mg/dL


 


AST     (17-59)  U/L


 


Alkaline Phosphatase     ()  U/L


 


Total Protein     (6.3-8.2)  g/dL


 


Albumin     (3.5-5.0)  g/dL


 


Arterial Blood Potassium     (3.4-4.5)  mmol/L


 


Arterial Blood Glucose     (75-99)  mg/dL


 


Crossmatch     














  03/26/21 03/27/21 03/27/21 Range/Units





  23:02 00:06 01:09 


 


RBC     (4.30-5.90)  m/uL


 


Hgb     (13.0-17.5)  gm/dL


 


Hct     (39.0-53.0)  %


 


Neutrophils #     (1.3-7.7)  k/uL


 


Lymphocytes #     (1.0-4.8)  k/uL


 


PT     (9.0-12.0)  sec


 


INR     (<1.2)  


 


ABG pH     (7.35-7.45)  


 


ABG pCO2     (35-45)  mmHg


 


ABG pO2     ()  mmHg


 


ABG Total CO2     (19-24)  mmol/L


 


ABG O2 Saturation     (94-97)  %


 


ABG Hematocrit     (34.0-46.0)  %


 


ABG Sodium     (135-146)  mmol/L


 


ABG Potassium     (3.4-4.5)  mmol/L


 


ABG Ionized Calcium     (4.5-5.3)  mg/dL


 


ABG Glucose     (75-99)  mg/dL


 


Hemoglobin     (13.0-17.5)  gm/dL


 


Sodium     (137-145)  mmol/L


 


Potassium     (3.5-5.1)  mmol/L


 


Chloride     ()  mmol/L


 


Creatinine     (0.66-1.25)  mg/dL


 


Glucose     (74-99)  mg/dL


 


POC Glucose (mg/dL)  144 H  131 H  133 H  (75-99)  mg/dL


 


Calcium     (8.4-10.2)  mg/dL


 


Magnesium     (1.6-2.3)  mg/dL


 


AST     (17-59)  U/L


 


Alkaline Phosphatase     ()  U/L


 


Total Protein     (6.3-8.2)  g/dL


 


Albumin     (3.5-5.0)  g/dL


 


Arterial Blood Potassium     (3.4-4.5)  mmol/L


 


Arterial Blood Glucose     (75-99)  mg/dL


 


Crossmatch     














  03/27/21 03/27/21 03/27/21 Range/Units





  04:05 04:17 04:17 


 


RBC   2.74 L   (4.30-5.90)  m/uL


 


Hgb   8.8 L   (13.0-17.5)  gm/dL


 


Hct   24.9 L   (39.0-53.0)  %


 


Neutrophils #   8.0 H   (1.3-7.7)  k/uL


 


Lymphocytes #   0.8 L   (1.0-4.8)  k/uL


 


PT     (9.0-12.0)  sec


 


INR     (<1.2)  


 


ABG pH     (7.35-7.45)  


 


ABG pCO2     (35-45)  mmHg


 


ABG pO2     ()  mmHg


 


ABG Total CO2     (19-24)  mmol/L


 


ABG O2 Saturation     (94-97)  %


 


ABG Hematocrit     (34.0-46.0)  %


 


ABG Sodium     (135-146)  mmol/L


 


ABG Potassium     (3.4-4.5)  mmol/L


 


ABG Ionized Calcium     (4.5-5.3)  mg/dL


 


ABG Glucose     (75-99)  mg/dL


 


Hemoglobin     (13.0-17.5)  gm/dL


 


Sodium    135 L  (137-145)  mmol/L


 


Potassium    5.4 H  (3.5-5.1)  mmol/L


 


Chloride    108 H  ()  mmol/L


 


Creatinine     (0.66-1.25)  mg/dL


 


Glucose    117 H  (74-99)  mg/dL


 


POC Glucose (mg/dL)  123 H    (75-99)  mg/dL


 


Calcium    7.6 L  (8.4-10.2)  mg/dL


 


Magnesium     (1.6-2.3)  mg/dL


 


AST    77 H  (17-59)  U/L


 


Alkaline Phosphatase     ()  U/L


 


Total Protein    4.7 L  (6.3-8.2)  g/dL


 


Albumin    2.8 L  (3.5-5.0)  g/dL


 


Arterial Blood Potassium     (3.4-4.5)  mmol/L


 


Arterial Blood Glucose     (75-99)  mg/dL


 


Crossmatch     














  03/27/21 03/27/21 Range/Units





  05:11 07:07 


 


RBC    (4.30-5.90)  m/uL


 


Hgb    (13.0-17.5)  gm/dL


 


Hct    (39.0-53.0)  %


 


Neutrophils #    (1.3-7.7)  k/uL


 


Lymphocytes #    (1.0-4.8)  k/uL


 


PT    (9.0-12.0)  sec


 


INR    (<1.2)  


 


ABG pH    (7.35-7.45)  


 


ABG pCO2    (35-45)  mmHg


 


ABG pO2    ()  mmHg


 


ABG Total CO2    (19-24)  mmol/L


 


ABG O2 Saturation    (94-97)  %


 


ABG Hematocrit    (34.0-46.0)  %


 


ABG Sodium    (135-146)  mmol/L


 


ABG Potassium    (3.4-4.5)  mmol/L


 


ABG Ionized Calcium    (4.5-5.3)  mg/dL


 


ABG Glucose    (75-99)  mg/dL


 


Hemoglobin    (13.0-17.5)  gm/dL


 


Sodium    (137-145)  mmol/L


 


Potassium    (3.5-5.1)  mmol/L


 


Chloride    ()  mmol/L


 


Creatinine    (0.66-1.25)  mg/dL


 


Glucose    (74-99)  mg/dL


 


POC Glucose (mg/dL)  129 H  171 H  (75-99)  mg/dL


 


Calcium    (8.4-10.2)  mg/dL


 


Magnesium    (1.6-2.3)  mg/dL


 


AST    (17-59)  U/L


 


Alkaline Phosphatase    ()  U/L


 


Total Protein    (6.3-8.2)  g/dL


 


Albumin    (3.5-5.0)  g/dL


 


Arterial Blood Potassium    (3.4-4.5)  mmol/L


 


Arterial Blood Glucose    (75-99)  mg/dL


 


Crossmatch    














Assessment and Plan


Assessment: 





1.  Triple-vessel coronary artery disease, non-STEMI this admission, status post

four-vessel CABG


2.  Moderate to severe predominantly centrally directed mitral regurgitation, 

status post mitral valve repair


3.  Insulin-dependent diabetes, hemoglobin A1c 8.2%


4.  Hyperlipidemia, cholesterol 241, triglyceride 200, 


5.  Left internal carotid artery stenosis, greater than 70% per carotid Doppler,

greater than 90% per neck CTA


6.  Lifelong nonsmoker with FEV1 58% of predicted 


7.  Family history of premature coronary artery disease, father and 2 brothers 

diagnosed with CAD with subsequent CABG in their early to mid 50s


8.  EtOH use most days without history of withdrawal


9.  Postoperative acute blood loss anemia, expected


Plan: 





1.  Continue aspirin, statin, Plavix, beta blocker therapy.  Will increase beta 

blocker therapy as tolerated.  Will give extra Plavix today secondary to left 

carotid stenosis


2.  Wean levo as tolerated


3.  Will discontinue IV nitro and add oral CCB for radial artery spasm


4.  Wean O2 as tolerated.  Encourage incentive spirometry use 10 times every 

hour while awake


5.  Increase activity, ambulate as tolerated.  PT/OT/cardiac rehab consulted


6.  Will monitor daily labs and x-rays.  Electrolyte replacement per protocol.  

No transfusion


7.  GI/DVT prophylaxis


8.  Pain control with current medication regimen


9.  Insulin management per primary care service.  Patient to remain on insulin 

drip for 24 hours minimum.  Needs tight blood sugar control for healing


10.  [facility] protocol to monitor for alcohol withdrawal.  Continue thiamine, folic 

acid


11.  Left carotid stenosis intervention to be completed in the future.  Avoid 

hypotension


12.  Discontinue [place].  Connect [place] to continue CVP monitoring


13.  Will discontinue left forearm MAKAYLA drain.  Continue mediastinal, left pleural

chest tubes for 24 hours


14.  Continue [place] for another 24 hours for strict accurate intake and output. 

Daily weights.


15.  More recommendations to follow depending on patient's progress


Time with Patient: Greater than 30

## 2021-03-27 NOTE — P.PN
Subjective


Progress Note Date: 03/27/21











On today's evaluation of 03/27/2021, the patient is postop day #1.  The patient 

was brought into the intensive care unit and he was hemodynamically stable and 

was extubated at around 9:15 PM yesterday.  This morning, the patient is able to

sit up on a recliner.  He remains extubated.  He remains on oxygen at 2 L per 

minute nasal cannula.  Note that since his surgery, the patient initially 

received 500 mL bolus of albumin 5%, after sitting up on a chair his blood 

pressure dropped again and we had to give another bolus of 500 mL of albumin.  

His urine output is improving.  Overnight, he was taken off the Primacor and he 

was started on levo fed initially 0.05 g and currently he is running at 0.11 

mcg/kg per minute.  Urine output is adequate for now.  BP is showing a systolic 

of 100-110.  Cardiac output is 5.4 with an index of 2.7.  No cardiac 

arrhythmias.  No atrial fibrillation.  His cardiac rhythm is sinus.  Chest tubes

are still in place.  Output from the mediastinal was 500 mL yesterday and left 

pleural is 300.  The patient has a total of 3 chest tubes left pleural, 

mediastinal 2.  The patient otherwise is doing well.  He did have a bout of 

blurred vision in his left eye yesterday without any neurologic deficit and 

currently is neurologically intact.  No aphasia.  No headaches.  No altered 

mentation.  Insulin drip is running at 1.5 units an hour and nitroglycerin drip 

drip is running at 5 units an hour regarding his radial artery harvest.  

Otherwise, no other significant events.  Chest x-ray shows adequate expansion of

both lungs.  No evidence of any pneumothorax.  There is a gastric bubble.  Elberta-

Raj catheter is still in place.  Hemoglobin today is at 8.8.  Rest of the blood

work is all within normal limits.





Objective





- Vital Signs


Vital signs: 


                                   Vital Signs











Temp  99.5 F   03/27/21 06:00


 


Pulse  80   03/27/21 06:00


 


Resp  19   03/27/21 06:00


 


BP  96/63   03/27/21 06:00


 


Pulse Ox  97   03/27/21 06:00








                                 Intake & Output











 03/26/21 03/26/21 03/27/21





 06:59 18:59 06:59


 


Intake Total 390 741.028 4292.583


 


Output Total  4340 852


 


Balance 390 -3865.307 660.583


 


Weight 85.8 kg  89.9 kg


 


Intake:   


 


   72 918


 


    .9NS Pressure Bag  9 108


 


    Cardiac Output  10 160


 


    Sodium Chloride 0.9% 1,   550





    000 ml @ 50 mls/hr IV .   





    Q20H [facility] Rx#:[id number]   


 


    ceFAZolin 2 gm In Sodium   100





    Chloride 0.9% 50 ml @ 100   





    mls/hr IVPB Q8HR [facility] Rx#   





    :[id number]   


 


  Intake, IV Titration  402.693 594.583





  Amount   


 


    Albumin Human 5% 250 ml @  250 250





    0 mls/hr IVPB .STK-MED   





    [facility] Rx#:[id number]   


 


    Insulin Regular 100 unit  2.693 23.533





    In Sodium Chloride 0.9%   





    100 ml @ Per Protocol IV   





    .Q0M [facility] Rx#:[id number]   


 


    Milrinone-D5w Pmx 20 mg   54.805





    In Dextrose/Water 1 100ml   





    .bag @ 0.25 MCG/KG/MIN 6.   





    435 mls/hr IV .[id number]   





    [facility] Rx#:[id number]   


 


    Norepinephrine 4 mg In   216.245





    Sodium Chloride 0.9% 250   





    ml @ 0.05 MCG/KG/MIN 16.   





    345 mls/hr IV .[id number]   





    [facility] Rx#:[id number]   


 


    Sodium Chloride 0.9% 1,  100 50





    000 ml @ 50 mls/hr IV .   





    Q20H [facility] Rx#:[id number]   


 


    ceFAZolin 2 gm In Sodium  50 





    Chloride 0.9% 50 ml @ 100   





    mls/hr IVPB Q8HR [facility] Rx#   





    :[id number]   


 


  Oral 290  


 


Output:   


 


  Chest Tube Drainage  360 422


 


    Left Pleural Chest tube  120 182


 


    Mediastinal Chest Tubes X  240 240





    2   


 


  Drainage   0


 


    Left Wrist   0


 


  Urine  1980 430


 


  Estimated Blood Loss  2000 


 


Other:   


 


  Voiding Method Toilet  Indwelling Catheter


 


  # Voids 3  








                       ABP, PAP, CO, CI - Last Documented











Arterial Blood Pressure        107/58


 


Pulmonary Artery Pressure      43/22


 


Cardiac Output                 5.4


 


Cardiac Index                  2.7

















- Exam








Gen. appearance, comfortable not in acute distress, sitting up on a chair, 

communicating, no signs of any respiratory distress.  The patient has a right IJ

Cordis and Elberta-Raj catheter in place.  He is extubated.


Neck was supple and without jugular venous distension, thyromegaly, or carotid 

bruits. Carotids were easily palpable bilaterally. There was no adenopathy.  The

patient has a right IJ Elberta-Raj catheter in place.


Lungs sounds are diminished bilaterally.  The patient has 3 chest tubes, left 

pleural and 2 mediastinal chest tubes and all of them are in place.  Sternum is 

stable clean and intact.


Cardiac exam revealed the PMI to be normally situated and sized. The rhythm was 

regular and no extrasystoles were noted during several minutes of auscultation. 

The first and second heart sounds were normal and physiologic splitting of the 

second heart sound was noted. There were no murmurs, rubs, clicks, or gallops.


Abdominal exam revealed normal bowel sounds. The abdomen was soft, non-tender, 

and without masses, organomegaly, or appreciable enlargement of the abdominal 

aorta.


Examination of the extremities revealed easily palpable radial, femoral and 

pedal pulses. There was no cyanosis, clubbing or edema.  Surgical wound site 

over the left radial area is dry clean and intact.  There is a MAKAYLA drain in place

without any significant output.  Surgical wound site over the lower extremity 

are also dry clean and intact.


Examination of the skin revealed no evidence of significant rashes, suspicious 

appearing nevi or other concerning lesions.


Neurologically, the patient is awake and alert and the patient does not have any

focal neurological deficit.  Cranial nerves are essentially intact.





- Labs


CBC & Chem 7: 


                                 03/27/21 04:17





                                 03/27/21 04:17


Labs: 


                  Abnormal Lab Results - Last 24 Hours (Table)











  03/25/21 03/26/21 03/26/21 Range/Units





  08:28 08:38 10:39 


 


RBC     (4.30-5.90)  m/uL


 


Hgb     (13.0-17.5)  gm/dL


 


Hct     (39.0-53.0)  %


 


Neutrophils #     (1.3-7.7)  k/uL


 


Lymphocytes #     (1.0-4.8)  k/uL


 


PT     (9.0-12.0)  sec


 


INR     (<1.2)  


 


ABG pH     (7.35-7.45)  


 


ABG pCO2     (35-45)  mmHg


 


ABG pO2   307 H  121 H  ()  mmHg


 


ABG Total CO2   25 H  25 H  (19-24)  mmol/L


 


ABG O2 Saturation   100.0 H  99.2 H  (94-97)  %


 


ABG Hematocrit   33 L  30 L  (34.0-46.0)  %


 


ABG Sodium     (135-146)  mmol/L


 


ABG Potassium     (3.4-4.5)  mmol/L


 


ABG Ionized Calcium     (4.5-5.3)  mg/dL


 


ABG Glucose   154 H  171 H  (75-99)  mg/dL


 


Hemoglobin   10.8 L  9.9 L  (13.0-17.5)  gm/dL


 


Sodium     (137-145)  mmol/L


 


Potassium     (3.5-5.1)  mmol/L


 


Chloride     ()  mmol/L


 


Creatinine     (0.66-1.25)  mg/dL


 


Glucose     (74-99)  mg/dL


 


POC Glucose (mg/dL)     (75-99)  mg/dL


 


Calcium     (8.4-10.2)  mg/dL


 


Magnesium     (1.6-2.3)  mg/dL


 


AST     (17-59)  U/L


 


Alkaline Phosphatase     ()  U/L


 


Total Protein     (6.3-8.2)  g/dL


 


Albumin     (3.5-5.0)  g/dL


 


Arterial Blood Potassium     (3.4-4.5)  mmol/L


 


Arterial Blood Glucose   154 H  171 H  (75-99)  mg/dL


 


Crossmatch  See Detail    














  03/26/21 03/26/21 03/26/21 Range/Units





  11:38 11:52 12:25 


 


RBC     (4.30-5.90)  m/uL


 


Hgb     (13.0-17.5)  gm/dL


 


Hct     (39.0-53.0)  %


 


Neutrophils #     (1.3-7.7)  k/uL


 


Lymphocytes #     (1.0-4.8)  k/uL


 


PT     (9.0-12.0)  sec


 


INR     (<1.2)  


 


ABG pH    7.34 L  (7.35-7.45)  


 


ABG pCO2     (35-45)  mmHg


 


ABG pO2  142 H  371 H  292 H  ()  mmHg


 


ABG Total CO2  25 H  25 H  25 H  (19-24)  mmol/L


 


ABG O2 Saturation  99.6 H  100.0 H  100.0 H  (94-97)  %


 


ABG Hematocrit  31 L  28 L  25 L  (34.0-46.0)  %


 


ABG Sodium    133 L  (135-146)  mmol/L


 


ABG Potassium    5.2 H  (3.4-4.5)  mmol/L


 


ABG Ionized Calcium   4.3 L  4.2 L  (4.5-5.3)  mg/dL


 


ABG Glucose  183 H  181 H  260 H  (75-99)  mg/dL


 


Hemoglobin  10.1 L  9.3 L  8.2 L  (13.0-17.5)  gm/dL


 


Sodium     (137-145)  mmol/L


 


Potassium     (3.5-5.1)  mmol/L


 


Chloride     ()  mmol/L


 


Creatinine     (0.66-1.25)  mg/dL


 


Glucose     (74-99)  mg/dL


 


POC Glucose (mg/dL)     (75-99)  mg/dL


 


Calcium     (8.4-10.2)  mg/dL


 


Magnesium     (1.6-2.3)  mg/dL


 


AST     (17-59)  U/L


 


Alkaline Phosphatase     ()  U/L


 


Total Protein     (6.3-8.2)  g/dL


 


Albumin     (3.5-5.0)  g/dL


 


Arterial Blood Potassium    5.2 H  (3.4-4.5)  mmol/L


 


Arterial Blood Glucose  183 H  181 H  260 H  (75-99)  mg/dL


 


Crossmatch     














  03/26/21 03/26/21 03/26/21 Range/Units





  12:49 13:25 14:06 


 


RBC     (4.30-5.90)  m/uL


 


Hgb     (13.0-17.5)  gm/dL


 


Hct     (39.0-53.0)  %


 


Neutrophils #     (1.3-7.7)  k/uL


 


Lymphocytes #     (1.0-4.8)  k/uL


 


PT     (9.0-12.0)  sec


 


INR     (<1.2)  


 


ABG pH  7.30 L    (7.35-7.45)  


 


ABG pCO2  47 H    (35-45)  mmHg


 


ABG pO2  266 H  324 H  328 H  ()  mmHg


 


ABG Total CO2   25 H  25 H  (19-24)  mmol/L


 


ABG O2 Saturation  100.0 H  100.0 H  100.0 H  (94-97)  %


 


ABG Hematocrit  25 L  25 L  24 L  (34.0-46.0)  %


 


ABG Sodium   134 L   (135-146)  mmol/L


 


ABG Potassium     (3.4-4.5)  mmol/L


 


ABG Ionized Calcium  4.3 L  4.2 L  4.3 L  (4.5-5.3)  mg/dL


 


ABG Glucose  276 H  311 H  301 H  (75-99)  mg/dL


 


Hemoglobin  8.2 L  8.0 L  7.8 L  (13.0-17.5)  gm/dL


 


Sodium     (137-145)  mmol/L


 


Potassium     (3.5-5.1)  mmol/L


 


Chloride     ()  mmol/L


 


Creatinine     (0.66-1.25)  mg/dL


 


Glucose     (74-99)  mg/dL


 


POC Glucose (mg/dL)     (75-99)  mg/dL


 


Calcium     (8.4-10.2)  mg/dL


 


Magnesium     (1.6-2.3)  mg/dL


 


AST     (17-59)  U/L


 


Alkaline Phosphatase     ()  U/L


 


Total Protein     (6.3-8.2)  g/dL


 


Albumin     (3.5-5.0)  g/dL


 


Arterial Blood Potassium     (3.4-4.5)  mmol/L


 


Arterial Blood Glucose  276 H  311 H  301 H  (75-99)  mg/dL


 


Crossmatch     














  03/26/21 03/26/21 03/26/21 Range/Units





  14:33 16:43 16:45 


 


RBC    3.06 L  (4.30-5.90)  m/uL


 


Hgb    9.8 L D  (13.0-17.5)  gm/dL


 


Hct    28.3 L  (39.0-53.0)  %


 


Neutrophils #     (1.3-7.7)  k/uL


 


Lymphocytes #     (1.0-4.8)  k/uL


 


PT     (9.0-12.0)  sec


 


INR     (<1.2)  


 


ABG pH     (7.35-7.45)  


 


ABG pCO2     (35-45)  mmHg


 


ABG pO2  294 H    ()  mmHg


 


ABG Total CO2  25 H    (19-24)  mmol/L


 


ABG O2 Saturation  100.0 H    (94-97)  %


 


ABG Hematocrit  24 L    (34.0-46.0)  %


 


ABG Sodium     (135-146)  mmol/L


 


ABG Potassium     (3.4-4.5)  mmol/L


 


ABG Ionized Calcium  4.3 L    (4.5-5.3)  mg/dL


 


ABG Glucose  280 H    (75-99)  mg/dL


 


Hemoglobin  7.8 L    (13.0-17.5)  gm/dL


 


Sodium     (137-145)  mmol/L


 


Potassium     (3.5-5.1)  mmol/L


 


Chloride     ()  mmol/L


 


Creatinine     (0.66-1.25)  mg/dL


 


Glucose     (74-99)  mg/dL


 


POC Glucose (mg/dL)   183 H   (75-99)  mg/dL


 


Calcium     (8.4-10.2)  mg/dL


 


Magnesium     (1.6-2.3)  mg/dL


 


AST     (17-59)  U/L


 


Alkaline Phosphatase     ()  U/L


 


Total Protein     (6.3-8.2)  g/dL


 


Albumin     (3.5-5.0)  g/dL


 


Arterial Blood Potassium     (3.4-4.5)  mmol/L


 


Arterial Blood Glucose  280 H    (75-99)  mg/dL


 


Crossmatch     














  03/26/21 03/26/21 03/26/21 Range/Units





  16:45 16:45 17:09 


 


RBC     (4.30-5.90)  m/uL


 


Hgb     (13.0-17.5)  gm/dL


 


Hct     (39.0-53.0)  %


 


Neutrophils #     (1.3-7.7)  k/uL


 


Lymphocytes #     (1.0-4.8)  k/uL


 


PT  12.2 H    (9.0-12.0)  sec


 


INR  1.2 H    (<1.2)  


 


ABG pH     (7.35-7.45)  


 


ABG pCO2     (35-45)  mmHg


 


ABG pO2     ()  mmHg


 


ABG Total CO2     (19-24)  mmol/L


 


ABG O2 Saturation     (94-97)  %


 


ABG Hematocrit     (34.0-46.0)  %


 


ABG Sodium     (135-146)  mmol/L


 


ABG Potassium     (3.4-4.5)  mmol/L


 


ABG Ionized Calcium     (4.5-5.3)  mg/dL


 


ABG Glucose     (75-99)  mg/dL


 


Hemoglobin     (13.0-17.5)  gm/dL


 


Sodium   136 L   (137-145)  mmol/L


 


Potassium     (3.5-5.1)  mmol/L


 


Chloride     ()  mmol/L


 


Creatinine   0.63 L   (0.66-1.25)  mg/dL


 


Glucose   173 H   (74-99)  mg/dL


 


POC Glucose (mg/dL)    171 H  (75-99)  mg/dL


 


Calcium   7.9 L   (8.4-10.2)  mg/dL


 


Magnesium   2.7 H   (1.6-2.3)  mg/dL


 


AST     (17-59)  U/L


 


Alkaline Phosphatase   34 L   ()  U/L


 


Total Protein   4.3 L   (6.3-8.2)  g/dL


 


Albumin   2.4 L   (3.5-5.0)  g/dL


 


Arterial Blood Potassium     (3.4-4.5)  mmol/L


 


Arterial Blood Glucose     (75-99)  mg/dL


 


Crossmatch     














  03/26/21 03/26/21 03/26/21 Range/Units





  17:27 18:13 18:56 


 


RBC     (4.30-5.90)  m/uL


 


Hgb     (13.0-17.5)  gm/dL


 


Hct     (39.0-53.0)  %


 


Neutrophils #     (1.3-7.7)  k/uL


 


Lymphocytes #     (1.0-4.8)  k/uL


 


PT     (9.0-12.0)  sec


 


INR     (<1.2)  


 


ABG pH     (7.35-7.45)  


 


ABG pCO2     (35-45)  mmHg


 


ABG pO2  155 H    ()  mmHg


 


ABG Total CO2  26 H    (19-24)  mmol/L


 


ABG O2 Saturation  99.2 H    (94-97)  %


 


ABG Hematocrit     (34.0-46.0)  %


 


ABG Sodium     (135-146)  mmol/L


 


ABG Potassium     (3.4-4.5)  mmol/L


 


ABG Ionized Calcium     (4.5-5.3)  mg/dL


 


ABG Glucose     (75-99)  mg/dL


 


Hemoglobin     (13.0-17.5)  gm/dL


 


Sodium     (137-145)  mmol/L


 


Potassium     (3.5-5.1)  mmol/L


 


Chloride     ()  mmol/L


 


Creatinine     (0.66-1.25)  mg/dL


 


Glucose     (74-99)  mg/dL


 


POC Glucose (mg/dL)   132 H  133 H  (75-99)  mg/dL


 


Calcium     (8.4-10.2)  mg/dL


 


Magnesium     (1.6-2.3)  mg/dL


 


AST     (17-59)  U/L


 


Alkaline Phosphatase     ()  U/L


 


Total Protein     (6.3-8.2)  g/dL


 


Albumin     (3.5-5.0)  g/dL


 


Arterial Blood Potassium     (3.4-4.5)  mmol/L


 


Arterial Blood Glucose     (75-99)  mg/dL


 


Crossmatch     














  03/26/21 03/26/21 03/26/21 Range/Units





  18:57 20:06 20:55 


 


RBC  2.89 L    (4.30-5.90)  m/uL


 


Hgb  9.1 L    (13.0-17.5)  gm/dL


 


Hct  26.5 L    (39.0-53.0)  %


 


Neutrophils #     (1.3-7.7)  k/uL


 


Lymphocytes #  0.4 L    (1.0-4.8)  k/uL


 


PT     (9.0-12.0)  sec


 


INR     (<1.2)  


 


ABG pH     (7.35-7.45)  


 


ABG pCO2     (35-45)  mmHg


 


ABG pO2     ()  mmHg


 


ABG Total CO2    26 H  (19-24)  mmol/L


 


ABG O2 Saturation    98.6 H  (94-97)  %


 


ABG Hematocrit     (34.0-46.0)  %


 


ABG Sodium     (135-146)  mmol/L


 


ABG Potassium     (3.4-4.5)  mmol/L


 


ABG Ionized Calcium     (4.5-5.3)  mg/dL


 


ABG Glucose     (75-99)  mg/dL


 


Hemoglobin     (13.0-17.5)  gm/dL


 


Sodium     (137-145)  mmol/L


 


Potassium     (3.5-5.1)  mmol/L


 


Chloride     ()  mmol/L


 


Creatinine     (0.66-1.25)  mg/dL


 


Glucose     (74-99)  mg/dL


 


POC Glucose (mg/dL)   161 H   (75-99)  mg/dL


 


Calcium     (8.4-10.2)  mg/dL


 


Magnesium     (1.6-2.3)  mg/dL


 


AST     (17-59)  U/L


 


Alkaline Phosphatase     ()  U/L


 


Total Protein     (6.3-8.2)  g/dL


 


Albumin     (3.5-5.0)  g/dL


 


Arterial Blood Potassium     (3.4-4.5)  mmol/L


 


Arterial Blood Glucose     (75-99)  mg/dL


 


Crossmatch     














  03/26/21 03/26/21 03/26/21 Range/Units





  20:57 20:59 22:03 


 


RBC  2.71 L    (4.30-5.90)  m/uL


 


Hgb  8.4 L    (13.0-17.5)  gm/dL


 


Hct  24.8 L    (39.0-53.0)  %


 


Neutrophils #  7.9 H    (1.3-7.7)  k/uL


 


Lymphocytes #  0.4 L    (1.0-4.8)  k/uL


 


PT     (9.0-12.0)  sec


 


INR     (<1.2)  


 


ABG pH     (7.35-7.45)  


 


ABG pCO2     (35-45)  mmHg


 


ABG pO2     ()  mmHg


 


ABG Total CO2     (19-24)  mmol/L


 


ABG O2 Saturation     (94-97)  %


 


ABG Hematocrit     (34.0-46.0)  %


 


ABG Sodium     (135-146)  mmol/L


 


ABG Potassium     (3.4-4.5)  mmol/L


 


ABG Ionized Calcium     (4.5-5.3)  mg/dL


 


ABG Glucose     (75-99)  mg/dL


 


Hemoglobin     (13.0-17.5)  gm/dL


 


Sodium     (137-145)  mmol/L


 


Potassium     (3.5-5.1)  mmol/L


 


Chloride     ()  mmol/L


 


Creatinine     (0.66-1.25)  mg/dL


 


Glucose     (74-99)  mg/dL


 


POC Glucose (mg/dL)   152 H  167 H  (75-99)  mg/dL


 


Calcium     (8.4-10.2)  mg/dL


 


Magnesium     (1.6-2.3)  mg/dL


 


AST     (17-59)  U/L


 


Alkaline Phosphatase     ()  U/L


 


Total Protein     (6.3-8.2)  g/dL


 


Albumin     (3.5-5.0)  g/dL


 


Arterial Blood Potassium     (3.4-4.5)  mmol/L


 


Arterial Blood Glucose     (75-99)  mg/dL


 


Crossmatch     














  03/26/21 03/27/21 03/27/21 Range/Units





  23:02 00:06 01:09 


 


RBC     (4.30-5.90)  m/uL


 


Hgb     (13.0-17.5)  gm/dL


 


Hct     (39.0-53.0)  %


 


Neutrophils #     (1.3-7.7)  k/uL


 


Lymphocytes #     (1.0-4.8)  k/uL


 


PT     (9.0-12.0)  sec


 


INR     (<1.2)  


 


ABG pH     (7.35-7.45)  


 


ABG pCO2     (35-45)  mmHg


 


ABG pO2     ()  mmHg


 


ABG Total CO2     (19-24)  mmol/L


 


ABG O2 Saturation     (94-97)  %


 


ABG Hematocrit     (34.0-46.0)  %


 


ABG Sodium     (135-146)  mmol/L


 


ABG Potassium     (3.4-4.5)  mmol/L


 


ABG Ionized Calcium     (4.5-5.3)  mg/dL


 


ABG Glucose     (75-99)  mg/dL


 


Hemoglobin     (13.0-17.5)  gm/dL


 


Sodium     (137-145)  mmol/L


 


Potassium     (3.5-5.1)  mmol/L


 


Chloride     ()  mmol/L


 


Creatinine     (0.66-1.25)  mg/dL


 


Glucose     (74-99)  mg/dL


 


POC Glucose (mg/dL)  144 H  131 H  133 H  (75-99)  mg/dL


 


Calcium     (8.4-10.2)  mg/dL


 


Magnesium     (1.6-2.3)  mg/dL


 


AST     (17-59)  U/L


 


Alkaline Phosphatase     ()  U/L


 


Total Protein     (6.3-8.2)  g/dL


 


Albumin     (3.5-5.0)  g/dL


 


Arterial Blood Potassium     (3.4-4.5)  mmol/L


 


Arterial Blood Glucose     (75-99)  mg/dL


 


Crossmatch     














  03/27/21 03/27/21 03/27/21 Range/Units





  04:05 04:17 04:17 


 


RBC   2.74 L   (4.30-5.90)  m/uL


 


Hgb   8.8 L   (13.0-17.5)  gm/dL


 


Hct   24.9 L   (39.0-53.0)  %


 


Neutrophils #   8.0 H   (1.3-7.7)  k/uL


 


Lymphocytes #   0.8 L   (1.0-4.8)  k/uL


 


PT     (9.0-12.0)  sec


 


INR     (<1.2)  


 


ABG pH     (7.35-7.45)  


 


ABG pCO2     (35-45)  mmHg


 


ABG pO2     ()  mmHg


 


ABG Total CO2     (19-24)  mmol/L


 


ABG O2 Saturation     (94-97)  %


 


ABG Hematocrit     (34.0-46.0)  %


 


ABG Sodium     (135-146)  mmol/L


 


ABG Potassium     (3.4-4.5)  mmol/L


 


ABG Ionized Calcium     (4.5-5.3)  mg/dL


 


ABG Glucose     (75-99)  mg/dL


 


Hemoglobin     (13.0-17.5)  gm/dL


 


Sodium    135 L  (137-145)  mmol/L


 


Potassium    5.4 H  (3.5-5.1)  mmol/L


 


Chloride    108 H  ()  mmol/L


 


Creatinine     (0.66-1.25)  mg/dL


 


Glucose    117 H  (74-99)  mg/dL


 


POC Glucose (mg/dL)  123 H    (75-99)  mg/dL


 


Calcium    7.6 L  (8.4-10.2)  mg/dL


 


Magnesium     (1.6-2.3)  mg/dL


 


AST    77 H  (17-59)  U/L


 


Alkaline Phosphatase     ()  U/L


 


Total Protein    4.7 L  (6.3-8.2)  g/dL


 


Albumin    2.8 L  (3.5-5.0)  g/dL


 


Arterial Blood Potassium     (3.4-4.5)  mmol/L


 


Arterial Blood Glucose     (75-99)  mg/dL


 


Crossmatch     














  03/27/21 Range/Units





  05:11 


 


RBC   (4.30-5.90)  m/uL


 


Hgb   (13.0-17.5)  gm/dL


 


Hct   (39.0-53.0)  %


 


Neutrophils #   (1.3-7.7)  k/uL


 


Lymphocytes #   (1.0-4.8)  k/uL


 


PT   (9.0-12.0)  sec


 


INR   (<1.2)  


 


ABG pH   (7.35-7.45)  


 


ABG pCO2   (35-45)  mmHg


 


ABG pO2   ()  mmHg


 


ABG Total CO2   (19-24)  mmol/L


 


ABG O2 Saturation   (94-97)  %


 


ABG Hematocrit   (34.0-46.0)  %


 


ABG Sodium   (135-146)  mmol/L


 


ABG Potassium   (3.4-4.5)  mmol/L


 


ABG Ionized Calcium   (4.5-5.3)  mg/dL


 


ABG Glucose   (75-99)  mg/dL


 


Hemoglobin   (13.0-17.5)  gm/dL


 


Sodium   (137-145)  mmol/L


 


Potassium   (3.5-5.1)  mmol/L


 


Chloride   ()  mmol/L


 


Creatinine   (0.66-1.25)  mg/dL


 


Glucose   (74-99)  mg/dL


 


POC Glucose (mg/dL)  129 H  (75-99)  mg/dL


 


Calcium   (8.4-10.2)  mg/dL


 


Magnesium   (1.6-2.3)  mg/dL


 


AST   (17-59)  U/L


 


Alkaline Phosphatase   ()  U/L


 


Total Protein   (6.3-8.2)  g/dL


 


Albumin   (3.5-5.0)  g/dL


 


Arterial Blood Potassium   (3.4-4.5)  mmol/L


 


Arterial Blood Glucose   (75-99)  mg/dL


 


Crossmatch   














Assessment and Plan


Plan: 








#1.  Multivessel coronary artery disease, status post four-vessel coronary 

artery bypass grafting, with LIMA to the LAD, left radial artery graft to the 

circumflex, SVG to the diagonal, SVG to the PDA, and mitral valve repair, and le

ft atrial appendage exclusion, postoperative day #1 the patient was extubated on

9:15 PM.  He is doing well.  He is hemodynamically requiring some pressors and 

is currently on norepinephrine infusion and symptom is receiving IV albumin 

another 500 mL bolus.  His cardiac output is at 5.7 with an index of 2.7.  

Adequate urine output for now.  Output from the chest tubes are minimal.  

Neurologically intact.  He did have a episodes of blurred vision and is being 

monitored.  His vision is improved.  Cardiac rhythm is still sinus for now.  The

patient is also on a nitroglycerin drip running at 5 g per minute and 

norepinephrine infusion running at 0.1 mcg/kg per minute.  Note that the patient

has a carotid artery stenosis on 70% and the vision impairment of hurting on the

left could be related to low flow state or lower blood pressure in the setting 

of bypass and carotid artery stenosis.





#2.   Thoracotomy, chest tubes are all in place.  Currently on 2 L about 2 by 

nasal cannula.  Using I asked pulling approximately 7 50 mL.





#3.  Acute non-ST elevated myocardial infarction





#4.  Multivessel coronary artery disease,  





#5.  Moderate to severe mitral regurgitation of 3+, posterior repair





#6.  Diabetes mellitus type 2, insulin dependent, currently on insulin drip at 

1.5 units an hour





#7.  Lifetime nonsmoker, preop FEV1  58% possibly related to restrictive 

alveolar pattern, related to interstitial edema





#8.   CHF with systolic dysfunction, and mild to moderate impairment of left ve

ntricular systolic function EF of 40-45%





#9.   Anemia with a postoperative drop in hemoglobin down to 8.8, expected 

outcome of surgery





#10.  Left internal carotid stenosis of greater than 70%





#11.  Regular EtOH intake of 3-4 alcoholic drinks on a daily basis and no signs 

of any delirium tremens for now





#12.  Hyperlipidemia





Plan





Complete the bolus with albumin


Monitor blood pressure and urine output.  Maintain a systolic blood pressure was

around 110 above and monitor the neurologic function including the liver's 

vision that occurred earlier


He is on aspirin and Plavix for now


Gradually wean off norepinephrine infusion and Metamucil drip can be 

discontinued today


[place]ber[place]-Raj management per cardiothoracic surgery.  Hemodynamic parameters are 

adequate for now and a cardiac rhythm is sinus


Keep the chest tubes in place for today


Continue with insulin drip for today.  


Encouraged use of incentive spirometer


Monitor the output from the MAKAYLA drain


[place] for pain control and continue using incentive spirometer


Obviously give the patient ICU for today.  Chest x-ray was noted.


Critically care evaluation, > 30 min














Time with Patient: Greater than 30

## 2021-03-27 NOTE — XR
EXAMINATION TYPE: XR chest 1V portable

 

DATE OF EXAM: 3/27/2021

 

COMPARISON: 3/26/2021

 

HISTORY: Shortness of breath

 

TECHNIQUE: Single frontal view of the chest is obtained.

 

FINDINGS:

 

There has been interval decrease in the prominent interstitial markings seen on the prior study likel
y reflecting a decrease in interstitial edema. There is a moderate retrocardiac partially consolidati
ve opacity which is unchanged. There is a small focal opacity in the right lung base/infrahilar regio
n which is stable.

 

There is been interval removal of the ET tube and NG tube. The [place]-Raj catheter is unchanged in pos
ition.

 

No pneumothorax. The osseous structures are grossly intact.

 

IMPRESSION:

 

Interval improvement in the interstitial markings in the prior study likely reflecting decreased inte
rstitial edema. Bibasilar opacities are unchanged.

## 2021-03-27 NOTE — P.PN
Subjective


Progress Note Date: 03/27/21





HISTORY OF PRESENT ILLNESS: 





Patient is status post CABG 4: left internal mammary artery to the left 

anterior descending artery, left radial artery to the distal circumflex, reverse

saphenous vein graft from the aorta to the first diagonal artery, reverse 

saphenous vein graft from the aorta to the right coronary artery. patient 

examined this morning the intensive care unit.  Patient was extubated yesterday.

 He is currently sitting up in the chair.  Patient did become slightly h

ypotensive this morning and is on vasopressors.  Patient reports mild surgical 

discomfort in the chest.  He states his breathing does not feel labored.  He 

does not get short of breath.  Patient does report difficulty taking a deep 

breath due to pain.  He is pulling 5007 150 mL on his incentive spirometer. he 

is maintaining sinus mechanism on telemetry.





PHYSICAL EXAM: 


VITAL SIGNS: Reviewed.


GENERAL: Well-developed in no acute distress. 


NECK: Supple. No JVD or thyromegaly


LUNGS: Respirations even and unlabored. Lung sounds diminished bilaterally


HEART: Regular rate and rhythm.  S1 and S2 heard. Chest tubes noted.


EXTREMITIES: Normal range of motion.  No clubbing or cyanosis.  Peripheral 

pulses intact.  trace bilateral lower extremity edema





ASSESSMENT: 


Non-STEMI, triple vessel coronary artery disease status post CABG 4


Moderate to severe mitral regurgitation, status post mitral valve repair


Left internal carotid artery stenosis, greater than 70% per carotid Doppler, 

greater than 90% per neck CTA


Diabetes mellitus


Daily alcohol intake


Dyslipidemia


Family history of premature coronary artery disease


Acute blood loss anemia





PLAN: 


Continue postoperative management per CTS


wean vasopressors as tolerated


amlodipine added per CTS 


Continue aspirin, plavix, metoprolol, and atorvastatin


increase activity as tolerated


Encourage use of incentive spirometry


further recommendations pending patient's course








Nurse practitioner note has been reviewed by physician. Signing provider agrees 

with the documented findings, assessment, and plan of care. 








Objective





- Vital Signs


Vital signs: 


                                   Vital Signs











Temp  37.2 F L  03/27/21 08:00


 


Pulse  73   03/27/21 11:00


 


Resp  11 L  03/27/21 11:00


 


BP  104/69   03/27/21 10:15


 


Pulse Ox  96   03/27/21 11:00








                                 Intake & Output











 03/26/21 03/27/21 03/27/21





 18:59 06:59 18:59


 


Intake Total 028.108 7965.583 887.840


 


Output Total 4340 852 227


 


Balance -3865.307 910.583 660.840


 


Weight  89.9 kg 89.9 kg


 


Intake:   


 


  IV 72 918 395


 


    .9NS Pressure Bag 9 108 45


 


    Cardiac Output 10 160 50


 


    Sodium Chloride 0.9% 1,  550 250





    000 ml @ 20 mls/hr IV .   





    Q24H [facility] Rx#:[id number]   


 


    ceFAZolin 2 gm In Sodium  100 50





    Chloride 0.9% 50 ml @ 100   





    mls/hr IVPB Q8HR [facility] Rx#   





    :[id number]   


 


  Intake, IV Titration 402.693 844.583 372.840





  Amount   


 


    Albumin Human 5% 250 ml @ 250 500 250





    0 mls/hr IVPB .STK-MED   





    [facility] Rx#:[id number]   


 


    Insulin Regular 100 unit 2.693 23.533 26.622





    In Sodium Chloride 0.9%   





    100 ml @ Per Protocol IV   





    .Q0M [facility] Rx#:[id number]   


 


    Milrinone-D5w Pmx 20 mg  54.805 





    In Dextrose/Water 1 100ml   





    .bag @ 0.25 MCG/KG/MIN 6.   





    435 mls/hr IV .[id number]   





    [facility] Rx#:[id number]   


 


    Norepinephrine 4 mg In  216.245 96.218





    Sodium Chloride 0.9% 250   





    ml @ 0.05 MCG/KG/MIN 16.   





    345 mls/hr IV .[id number]   





    [facility] Rx#:[id number]   


 


    Sodium Chloride 0.9% 1, 100 50 





    000 ml @ 20 mls/hr IV .   





    Q24H [facility] Rx#:[id number]   


 


    ceFAZolin 2 gm In Sodium 50  





    Chloride 0.9% 50 ml @ 100   





    mls/hr IVPB Q8HR [facility] Rx#   





    :[id number]   


 


  Oral   120


 


Output:   


 


  Chest Tube Drainage 360 422 120


 


    Left Pleural Chest tube 120 182 100


 


    Mediastinal Chest Tubes X 240 240 20





    2   


 


  Drainage  0 0


 


    Left Wrist  0 0


 


  Urine 1980 430 107


 


  Estimated Blood Loss 2000  


 


Other:   


 


  Voiding Method  Indwelling Catheter Indwelling Catheter








                       ABP, PAP, CO, CI - Last Documented











Arterial Blood Pressure        91/45


 


Pulmonary Artery Pressure      27/7


 


Cardiac Output                 4.4


 


Cardiac Index                  2.2

















- Labs


CBC & Chem 7: 


                                 03/27/21 04:17





                                 03/27/21 04:17


Labs: 


                  Abnormal Lab Results - Last 24 Hours (Table)











  03/25/21 03/26/21 03/26/21 Range/Units





  08:28 08:38 10:39 


 


RBC     (4.30-5.90)  m/uL


 


Hgb     (13.0-17.5)  gm/dL


 


Hct     (39.0-53.0)  %


 


Neutrophils #     (1.3-7.7)  k/uL


 


Lymphocytes #     (1.0-4.8)  k/uL


 


PT     (9.0-12.0)  sec


 


INR     (<1.2)  


 


ABG pH     (7.35-7.45)  


 


ABG pCO2     (35-45)  mmHg


 


ABG pO2   307 H  121 H  ()  mmHg


 


ABG Total CO2   25 H  25 H  (19-24)  mmol/L


 


ABG O2 Saturation   100.0 H  99.2 H  (94-97)  %


 


ABG Hematocrit   33 L  30 L  (34.0-46.0)  %


 


ABG Sodium     (135-146)  mmol/L


 


ABG Potassium     (3.4-4.5)  mmol/L


 


ABG Ionized Calcium     (4.5-5.3)  mg/dL


 


ABG Glucose   154 H  171 H  (75-99)  mg/dL


 


Hemoglobin   10.8 L  9.9 L  (13.0-17.5)  gm/dL


 


Sodium     (137-145)  mmol/L


 


Potassium     (3.5-5.1)  mmol/L


 


Chloride     ()  mmol/L


 


Creatinine     (0.66-1.25)  mg/dL


 


Glucose     (74-99)  mg/dL


 


POC Glucose (mg/dL)     (75-99)  mg/dL


 


Calcium     (8.4-10.2)  mg/dL


 


Magnesium     (1.6-2.3)  mg/dL


 


AST     (17-59)  U/L


 


Alkaline Phosphatase     ()  U/L


 


Total Protein     (6.3-8.2)  g/dL


 


Albumin     (3.5-5.0)  g/dL


 


Arterial Blood Potassium     (3.4-4.5)  mmol/L


 


Arterial Blood Glucose   154 H  171 H  (75-99)  mg/dL


 


Crossmatch  See Detail    














  03/26/21 03/26/21 03/26/21 Range/Units





  11:38 11:52 12:25 


 


RBC     (4.30-5.90)  m/uL


 


Hgb     (13.0-17.5)  gm/dL


 


Hct     (39.0-53.0)  %


 


Neutrophils #     (1.3-7.7)  k/uL


 


Lymphocytes #     (1.0-4.8)  k/uL


 


PT     (9.0-12.0)  sec


 


INR     (<1.2)  


 


ABG pH    7.34 L  (7.35-7.45)  


 


ABG pCO2     (35-45)  mmHg


 


ABG pO2  142 H  371 H  292 H  ()  mmHg


 


ABG Total CO2  25 H  25 H  25 H  (19-24)  mmol/L


 


ABG O2 Saturation  99.6 H  100.0 H  100.0 H  (94-97)  %


 


ABG Hematocrit  31 L  28 L  25 L  (34.0-46.0)  %


 


ABG Sodium    133 L  (135-146)  mmol/L


 


ABG Potassium    5.2 H  (3.4-4.5)  mmol/L


 


ABG Ionized Calcium   4.3 L  4.2 L  (4.5-5.3)  mg/dL


 


ABG Glucose  183 H  181 H  260 H  (75-99)  mg/dL


 


Hemoglobin  10.1 L  9.3 L  8.2 L  (13.0-17.5)  gm/dL


 


Sodium     (137-145)  mmol/L


 


Potassium     (3.5-5.1)  mmol/L


 


Chloride     ()  mmol/L


 


Creatinine     (0.66-1.25)  mg/dL


 


Glucose     (74-99)  mg/dL


 


POC Glucose (mg/dL)     (75-99)  mg/dL


 


Calcium     (8.4-10.2)  mg/dL


 


Magnesium     (1.6-2.3)  mg/dL


 


AST     (17-59)  U/L


 


Alkaline Phosphatase     ()  U/L


 


Total Protein     (6.3-8.2)  g/dL


 


Albumin     (3.5-5.0)  g/dL


 


Arterial Blood Potassium    5.2 H  (3.4-4.5)  mmol/L


 


Arterial Blood Glucose  183 H  181 H  260 H  (75-99)  mg/dL


 


Crossmatch     














  03/26/21 03/26/21 03/26/21 Range/Units





  12:49 13:25 14:06 


 


RBC     (4.30-5.90)  m/uL


 


Hgb     (13.0-17.5)  gm/dL


 


Hct     (39.0-53.0)  %


 


Neutrophils #     (1.3-7.7)  k/uL


 


Lymphocytes #     (1.0-4.8)  k/uL


 


PT     (9.0-12.0)  sec


 


INR     (<1.2)  


 


ABG pH  7.30 L    (7.35-7.45)  


 


ABG pCO2  47 H    (35-45)  mmHg


 


ABG pO2  266 H  324 H  328 H  ()  mmHg


 


ABG Total CO2   25 H  25 H  (19-24)  mmol/L


 


ABG O2 Saturation  100.0 H  100.0 H  100.0 H  (94-97)  %


 


ABG Hematocrit  25 L  25 L  24 L  (34.0-46.0)  %


 


ABG Sodium   134 L   (135-146)  mmol/L


 


ABG Potassium     (3.4-4.5)  mmol/L


 


ABG Ionized Calcium  4.3 L  4.2 L  4.3 L  (4.5-5.3)  mg/dL


 


ABG Glucose  276 H  311 H  301 H  (75-99)  mg/dL


 


Hemoglobin  8.2 L  8.0 L  7.8 L  (13.0-17.5)  gm/dL


 


Sodium     (137-145)  mmol/L


 


Potassium     (3.5-5.1)  mmol/L


 


Chloride     ()  mmol/L


 


Creatinine     (0.66-1.25)  mg/dL


 


Glucose     (74-99)  mg/dL


 


POC Glucose (mg/dL)     (75-99)  mg/dL


 


Calcium     (8.4-10.2)  mg/dL


 


Magnesium     (1.6-2.3)  mg/dL


 


AST     (17-59)  U/L


 


Alkaline Phosphatase     ()  U/L


 


Total Protein     (6.3-8.2)  g/dL


 


Albumin     (3.5-5.0)  g/dL


 


Arterial Blood Potassium     (3.4-4.5)  mmol/L


 


Arterial Blood Glucose  276 H  311 H  301 H  (75-99)  mg/dL


 


Crossmatch     














  03/26/21 03/26/21 03/26/21 Range/Units





  14:33 16:43 16:45 


 


RBC    3.06 L  (4.30-5.90)  m/uL


 


Hgb    9.8 L D  (13.0-17.5)  gm/dL


 


Hct    28.3 L  (39.0-53.0)  %


 


Neutrophils #     (1.3-7.7)  k/uL


 


Lymphocytes #     (1.0-4.8)  k/uL


 


PT     (9.0-12.0)  sec


 


INR     (<1.2)  


 


ABG pH     (7.35-7.45)  


 


ABG pCO2     (35-45)  mmHg


 


ABG pO2  294 H    ()  mmHg


 


ABG Total CO2  25 H    (19-24)  mmol/L


 


ABG O2 Saturation  100.0 H    (94-97)  %


 


ABG Hematocrit  24 L    (34.0-46.0)  %


 


ABG Sodium     (135-146)  mmol/L


 


ABG Potassium     (3.4-4.5)  mmol/L


 


ABG Ionized Calcium  4.3 L    (4.5-5.3)  mg/dL


 


ABG Glucose  280 H    (75-99)  mg/dL


 


Hemoglobin  7.8 L    (13.0-17.5)  gm/dL


 


Sodium     (137-145)  mmol/L


 


Potassium     (3.5-5.1)  mmol/L


 


Chloride     ()  mmol/L


 


Creatinine     (0.66-1.25)  mg/dL


 


Glucose     (74-99)  mg/dL


 


POC Glucose (mg/dL)   183 H   (75-99)  mg/dL


 


Calcium     (8.4-10.2)  mg/dL


 


Magnesium     (1.6-2.3)  mg/dL


 


AST     (17-59)  U/L


 


Alkaline Phosphatase     ()  U/L


 


Total Protein     (6.3-8.2)  g/dL


 


Albumin     (3.5-5.0)  g/dL


 


Arterial Blood Potassium     (3.4-4.5)  mmol/L


 


Arterial Blood Glucose  280 H    (75-99)  mg/dL


 


Crossmatch     














  03/26/21 03/26/21 03/26/21 Range/Units





  16:45 16:45 17:09 


 


RBC     (4.30-5.90)  m/uL


 


Hgb     (13.0-17.5)  gm/dL


 


Hct     (39.0-53.0)  %


 


Neutrophils #     (1.3-7.7)  k/uL


 


Lymphocytes #     (1.0-4.8)  k/uL


 


PT  12.2 H    (9.0-12.0)  sec


 


INR  1.2 H    (<1.2)  


 


ABG pH     (7.35-7.45)  


 


ABG pCO2     (35-45)  mmHg


 


ABG pO2     ()  mmHg


 


ABG Total CO2     (19-24)  mmol/L


 


ABG O2 Saturation     (94-97)  %


 


ABG Hematocrit     (34.0-46.0)  %


 


ABG Sodium     (135-146)  mmol/L


 


ABG Potassium     (3.4-4.5)  mmol/L


 


ABG Ionized Calcium     (4.5-5.3)  mg/dL


 


ABG Glucose     (75-99)  mg/dL


 


Hemoglobin     (13.0-17.5)  gm/dL


 


Sodium   136 L   (137-145)  mmol/L


 


Potassium     (3.5-5.1)  mmol/L


 


Chloride     ()  mmol/L


 


Creatinine   0.63 L   (0.66-1.25)  mg/dL


 


Glucose   173 H   (74-99)  mg/dL


 


POC Glucose (mg/dL)    171 H  (75-99)  mg/dL


 


Calcium   7.9 L   (8.4-10.2)  mg/dL


 


Magnesium   2.7 H   (1.6-2.3)  mg/dL


 


AST     (17-59)  U/L


 


Alkaline Phosphatase   34 L   ()  U/L


 


Total Protein   4.3 L   (6.3-8.2)  g/dL


 


Albumin   2.4 L   (3.5-5.0)  g/dL


 


Arterial Blood Potassium     (3.4-4.5)  mmol/L


 


Arterial Blood Glucose     (75-99)  mg/dL


 


Crossmatch     














  03/26/21 03/26/21 03/26/21 Range/Units





  17:27 18:13 18:56 


 


RBC     (4.30-5.90)  m/uL


 


Hgb     (13.0-17.5)  gm/dL


 


Hct     (39.0-53.0)  %


 


Neutrophils #     (1.3-7.7)  k/uL


 


Lymphocytes #     (1.0-4.8)  k/uL


 


PT     (9.0-12.0)  sec


 


INR     (<1.2)  


 


ABG pH     (7.35-7.45)  


 


ABG pCO2     (35-45)  mmHg


 


ABG pO2  155 H    ()  mmHg


 


ABG Total CO2  26 H    (19-24)  mmol/L


 


ABG O2 Saturation  99.2 H    (94-97)  %


 


ABG Hematocrit     (34.0-46.0)  %


 


ABG Sodium     (135-146)  mmol/L


 


ABG Potassium     (3.4-4.5)  mmol/L


 


ABG Ionized Calcium     (4.5-5.3)  mg/dL


 


ABG Glucose     (75-99)  mg/dL


 


Hemoglobin     (13.0-17.5)  gm/dL


 


Sodium     (137-145)  mmol/L


 


Potassium     (3.5-5.1)  mmol/L


 


Chloride     ()  mmol/L


 


Creatinine     (0.66-1.25)  mg/dL


 


Glucose     (74-99)  mg/dL


 


POC Glucose (mg/dL)   132 H  133 H  (75-99)  mg/dL


 


Calcium     (8.4-10.2)  mg/dL


 


Magnesium     (1.6-2.3)  mg/dL


 


AST     (17-59)  U/L


 


Alkaline Phosphatase     ()  U/L


 


Total Protein     (6.3-8.2)  g/dL


 


Albumin     (3.5-5.0)  g/dL


 


Arterial Blood Potassium     (3.4-4.5)  mmol/L


 


Arterial Blood Glucose     (75-99)  mg/dL


 


Crossmatch     














  03/26/21 03/26/21 03/26/21 Range/Units





  18:57 20:06 20:55 


 


RBC  2.89 L    (4.30-5.90)  m/uL


 


Hgb  9.1 L    (13.0-17.5)  gm/dL


 


Hct  26.5 L    (39.0-53.0)  %


 


Neutrophils #     (1.3-7.7)  k/uL


 


Lymphocytes #  0.4 L    (1.0-4.8)  k/uL


 


PT     (9.0-12.0)  sec


 


INR     (<1.2)  


 


ABG pH     (7.35-7.45)  


 


ABG pCO2     (35-45)  mmHg


 


ABG pO2     ()  mmHg


 


ABG Total CO2    26 H  (19-24)  mmol/L


 


ABG O2 Saturation    98.6 H  (94-97)  %


 


ABG Hematocrit     (34.0-46.0)  %


 


ABG Sodium     (135-146)  mmol/L


 


ABG Potassium     (3.4-4.5)  mmol/L


 


ABG Ionized Calcium     (4.5-5.3)  mg/dL


 


ABG Glucose     (75-99)  mg/dL


 


Hemoglobin     (13.0-17.5)  gm/dL


 


Sodium     (137-145)  mmol/L


 


Potassium     (3.5-5.1)  mmol/L


 


Chloride     ()  mmol/L


 


Creatinine     (0.66-1.25)  mg/dL


 


Glucose     (74-99)  mg/dL


 


POC Glucose (mg/dL)   161 H   (75-99)  mg/dL


 


Calcium     (8.4-10.2)  mg/dL


 


Magnesium     (1.6-2.3)  mg/dL


 


AST     (17-59)  U/L


 


Alkaline Phosphatase     ()  U/L


 


Total Protein     (6.3-8.2)  g/dL


 


Albumin     (3.5-5.0)  g/dL


 


Arterial Blood Potassium     (3.4-4.5)  mmol/L


 


Arterial Blood Glucose     (75-99)  mg/dL


 


Crossmatch     














  03/26/21 03/26/21 03/26/21 Range/Units





  20:57 20:59 22:03 


 


RBC  2.71 L    (4.30-5.90)  m/uL


 


Hgb  8.4 L    (13.0-17.5)  gm/dL


 


Hct  24.8 L    (39.0-53.0)  %


 


Neutrophils #  7.9 H    (1.3-7.7)  k/uL


 


Lymphocytes #  0.4 L    (1.0-4.8)  k/uL


 


PT     (9.0-12.0)  sec


 


INR     (<1.2)  


 


ABG pH     (7.35-7.45)  


 


ABG pCO2     (35-45)  mmHg


 


ABG pO2     ()  mmHg


 


ABG Total CO2     (19-24)  mmol/L


 


ABG O2 Saturation     (94-97)  %


 


ABG Hematocrit     (34.0-46.0)  %


 


ABG Sodium     (135-146)  mmol/L


 


ABG Potassium     (3.4-4.5)  mmol/L


 


ABG Ionized Calcium     (4.5-5.3)  mg/dL


 


ABG Glucose     (75-99)  mg/dL


 


Hemoglobin     (13.0-17.5)  gm/dL


 


Sodium     (137-145)  mmol/L


 


Potassium     (3.5-5.1)  mmol/L


 


Chloride     ()  mmol/L


 


Creatinine     (0.66-1.25)  mg/dL


 


Glucose     (74-99)  mg/dL


 


POC Glucose (mg/dL)   152 H  167 H  (75-99)  mg/dL


 


Calcium     (8.4-10.2)  mg/dL


 


Magnesium     (1.6-2.3)  mg/dL


 


AST     (17-59)  U/L


 


Alkaline Phosphatase     ()  U/L


 


Total Protein     (6.3-8.2)  g/dL


 


Albumin     (3.5-5.0)  g/dL


 


Arterial Blood Potassium     (3.4-4.5)  mmol/L


 


Arterial Blood Glucose     (75-99)  mg/dL


 


Crossmatch     














  03/26/21 03/27/21 03/27/21 Range/Units





  23:02 00:06 01:09 


 


RBC     (4.30-5.90)  m/uL


 


Hgb     (13.0-17.5)  gm/dL


 


Hct     (39.0-53.0)  %


 


Neutrophils #     (1.3-7.7)  k/uL


 


Lymphocytes #     (1.0-4.8)  k/uL


 


PT     (9.0-12.0)  sec


 


INR     (<1.2)  


 


ABG pH     (7.35-7.45)  


 


ABG pCO2     (35-45)  mmHg


 


ABG pO2     ()  mmHg


 


ABG Total CO2     (19-24)  mmol/L


 


ABG O2 Saturation     (94-97)  %


 


ABG Hematocrit     (34.0-46.0)  %


 


ABG Sodium     (135-146)  mmol/L


 


ABG Potassium     (3.4-4.5)  mmol/L


 


ABG Ionized Calcium     (4.5-5.3)  mg/dL


 


ABG Glucose     (75-99)  mg/dL


 


Hemoglobin     (13.0-17.5)  gm/dL


 


Sodium     (137-145)  mmol/L


 


Potassium     (3.5-5.1)  mmol/L


 


Chloride     ()  mmol/L


 


Creatinine     (0.66-1.25)  mg/dL


 


Glucose     (74-99)  mg/dL


 


POC Glucose (mg/dL)  144 H  131 H  133 H  (75-99)  mg/dL


 


Calcium     (8.4-10.2)  mg/dL


 


Magnesium     (1.6-2.3)  mg/dL


 


AST     (17-59)  U/L


 


Alkaline Phosphatase     ()  U/L


 


Total Protein     (6.3-8.2)  g/dL


 


Albumin     (3.5-5.0)  g/dL


 


Arterial Blood Potassium     (3.4-4.5)  mmol/L


 


Arterial Blood Glucose     (75-99)  mg/dL


 


Crossmatch     














  03/27/21 03/27/21 03/27/21 Range/Units





  04:05 04:17 04:17 


 


RBC   2.74 L   (4.30-5.90)  m/uL


 


Hgb   8.8 L   (13.0-17.5)  gm/dL


 


Hct   24.9 L   (39.0-53.0)  %


 


Neutrophils #   8.0 H   (1.3-7.7)  k/uL


 


Lymphocytes #   0.8 L   (1.0-4.8)  k/uL


 


PT     (9.0-12.0)  sec


 


INR     (<1.2)  


 


ABG pH     (7.35-7.45)  


 


ABG pCO2     (35-45)  mmHg


 


ABG pO2     ()  mmHg


 


ABG Total CO2     (19-24)  mmol/L


 


ABG O2 Saturation     (94-97)  %


 


ABG Hematocrit     (34.0-46.0)  %


 


ABG Sodium     (135-146)  mmol/L


 


ABG Potassium     (3.4-4.5)  mmol/L


 


ABG Ionized Calcium     (4.5-5.3)  mg/dL


 


ABG Glucose     (75-99)  mg/dL


 


Hemoglobin     (13.0-17.5)  gm/dL


 


Sodium    135 L  (137-145)  mmol/L


 


Potassium    5.4 H  (3.5-5.1)  mmol/L


 


Chloride    108 H  ()  mmol/L


 


Creatinine     (0.66-1.25)  mg/dL


 


Glucose    117 H  (74-99)  mg/dL


 


POC Glucose (mg/dL)  123 H    (75-99)  mg/dL


 


Calcium    7.6 L  (8.4-10.2)  mg/dL


 


Magnesium     (1.6-2.3)  mg/dL


 


AST    77 H  (17-59)  U/L


 


Alkaline Phosphatase     ()  U/L


 


Total Protein    4.7 L  (6.3-8.2)  g/dL


 


Albumin    2.8 L  (3.5-5.0)  g/dL


 


Arterial Blood Potassium     (3.4-4.5)  mmol/L


 


Arterial Blood Glucose     (75-99)  mg/dL


 


Crossmatch     














  03/27/21 03/27/21 03/27/21 Range/Units





  05:11 07:07 08:07 


 


RBC     (4.30-5.90)  m/uL


 


Hgb     (13.0-17.5)  gm/dL


 


Hct     (39.0-53.0)  %


 


Neutrophils #     (1.3-7.7)  k/uL


 


Lymphocytes #     (1.0-4.8)  k/uL


 


PT     (9.0-12.0)  sec


 


INR     (<1.2)  


 


ABG pH     (7.35-7.45)  


 


ABG pCO2     (35-45)  mmHg


 


ABG pO2     ()  mmHg


 


ABG Total CO2     (19-24)  mmol/L


 


ABG O2 Saturation     (94-97)  %


 


ABG Hematocrit     (34.0-46.0)  %


 


ABG Sodium     (135-146)  mmol/L


 


ABG Potassium     (3.4-4.5)  mmol/L


 


ABG Ionized Calcium     (4.5-5.3)  mg/dL


 


ABG Glucose     (75-99)  mg/dL


 


Hemoglobin     (13.0-17.5)  gm/dL


 


Sodium     (137-145)  mmol/L


 


Potassium     (3.5-5.1)  mmol/L


 


Chloride     ()  mmol/L


 


Creatinine     (0.66-1.25)  mg/dL


 


Glucose     (74-99)  mg/dL


 


POC Glucose (mg/dL)  129 H  171 H  171 H  (75-99)  mg/dL


 


Calcium     (8.4-10.2)  mg/dL


 


Magnesium     (1.6-2.3)  mg/dL


 


AST     (17-59)  U/L


 


Alkaline Phosphatase     ()  U/L


 


Total Protein     (6.3-8.2)  g/dL


 


Albumin     (3.5-5.0)  g/dL


 


Arterial Blood Potassium     (3.4-4.5)  mmol/L


 


Arterial Blood Glucose     (75-99)  mg/dL


 


Crossmatch     














  03/27/21 03/27/21 03/27/21 Range/Units





  09:19 10:15 11:07 


 


RBC     (4.30-5.90)  m/uL


 


Hgb     (13.0-17.5)  gm/dL


 


Hct     (39.0-53.0)  %


 


Neutrophils #     (1.3-7.7)  k/uL


 


Lymphocytes #     (1.0-4.8)  k/uL


 


PT     (9.0-12.0)  sec


 


INR     (<1.2)  


 


ABG pH     (7.35-7.45)  


 


ABG pCO2     (35-45)  mmHg


 


ABG pO2     ()  mmHg


 


ABG Total CO2     (19-24)  mmol/L


 


ABG O2 Saturation     (94-97)  %


 


ABG Hematocrit     (34.0-46.0)  %


 


ABG Sodium     (135-146)  mmol/L


 


ABG Potassium     (3.4-4.5)  mmol/L


 


ABG Ionized Calcium     (4.5-5.3)  mg/dL


 


ABG Glucose     (75-99)  mg/dL


 


Hemoglobin     (13.0-17.5)  gm/dL


 


Sodium     (137-145)  mmol/L


 


Potassium     (3.5-5.1)  mmol/L


 


Chloride     ()  mmol/L


 


Creatinine     (0.66-1.25)  mg/dL


 


Glucose     (74-99)  mg/dL


 


POC Glucose (mg/dL)  247 H  206 H  173 H  (75-99)  mg/dL


 


Calcium     (8.4-10.2)  mg/dL


 


Magnesium     (1.6-2.3)  mg/dL


 


AST     (17-59)  U/L


 


Alkaline Phosphatase     ()  U/L


 


Total Protein     (6.3-8.2)  g/dL


 


Albumin     (3.5-5.0)  g/dL


 


Arterial Blood Potassium     (3.4-4.5)  mmol/L


 


Arterial Blood Glucose     (75-99)  mg/dL


 


Crossmatch

## 2021-03-28 LAB
ALBUMIN SERPL-MCNC: 3 G/DL (ref 3.5–5)
ALP SERPL-CCNC: 50 U/L (ref 38–126)
ALT SERPL-CCNC: 20 U/L (ref 4–49)
ANION GAP SERPL CALC-SCNC: 7 MMOL/L
AST SERPL-CCNC: 56 U/L (ref 17–59)
BASOPHILS # BLD AUTO: 0 K/UL (ref 0–0.2)
BASOPHILS NFR BLD AUTO: 0 %
BUN SERPL-SCNC: 21 MG/DL (ref 9–20)
CALCIUM SPEC-MCNC: 7.5 MG/DL (ref 8.4–10.2)
CHLORIDE SERPL-SCNC: 108 MMOL/L (ref 98–107)
CO2 SERPL-SCNC: 21 MMOL/L (ref 22–30)
EOSINOPHIL # BLD AUTO: 0.1 K/UL (ref 0–0.7)
EOSINOPHIL NFR BLD AUTO: 1 %
ERYTHROCYTE [DISTWIDTH] IN BLOOD BY AUTOMATED COUNT: 2.41 M/UL (ref 4.3–5.9)
ERYTHROCYTE [DISTWIDTH] IN BLOOD: 12.7 % (ref 11.5–15.5)
GLUCOSE BLD-MCNC: 132 MG/DL (ref 75–99)
GLUCOSE BLD-MCNC: 133 MG/DL (ref 75–99)
GLUCOSE BLD-MCNC: 148 MG/DL (ref 75–99)
GLUCOSE BLD-MCNC: 244 MG/DL (ref 75–99)
GLUCOSE BLD-MCNC: 287 MG/DL (ref 75–99)
GLUCOSE BLD-MCNC: 302 MG/DL (ref 75–99)
GLUCOSE SERPL-MCNC: 126 MG/DL (ref 74–99)
HCT VFR BLD AUTO: 22.2 % (ref 39–53)
HGB BLD-MCNC: 7.8 GM/DL (ref 13–17.5)
LYMPHOCYTES # SPEC AUTO: 1 K/UL (ref 1–4.8)
LYMPHOCYTES NFR SPEC AUTO: 10 %
MCH RBC QN AUTO: 32.2 PG (ref 25–35)
MCHC RBC AUTO-ENTMCNC: 34.9 G/DL (ref 31–37)
MCV RBC AUTO: 92.1 FL (ref 80–100)
MONOCYTES # BLD AUTO: 0.6 K/UL (ref 0–1)
MONOCYTES NFR BLD AUTO: 6 %
NEUTROPHILS # BLD AUTO: 8 K/UL (ref 1.3–7.7)
NEUTROPHILS NFR BLD AUTO: 81 %
PLATELET # BLD AUTO: 193 K/UL (ref 150–450)
POTASSIUM SERPL-SCNC: 4.6 MMOL/L (ref 3.5–5.1)
PROT SERPL-MCNC: 5.2 G/DL (ref 6.3–8.2)
SODIUM SERPL-SCNC: 136 MMOL/L (ref 137–145)
WBC # BLD AUTO: 9.8 K/UL (ref 3.8–10.6)

## 2021-03-28 RX ADMIN — PANTOPRAZOLE SODIUM SCH MG: 40 TABLET, DELAYED RELEASE ORAL at 05:04

## 2021-03-28 RX ADMIN — HEPARIN SODIUM SCH UNIT: 5000 INJECTION, SOLUTION INTRAVENOUS; SUBCUTANEOUS at 08:07

## 2021-03-28 RX ADMIN — CLOPIDOGREL BISULFATE SCH MG: 75 TABLET ORAL at 08:11

## 2021-03-28 RX ADMIN — HYDROCODONE BITARTRATE AND ACETAMINOPHEN PRN EACH: 5; 325 TABLET ORAL at 21:26

## 2021-03-28 RX ADMIN — HYDROCODONE BITARTRATE AND ACETAMINOPHEN PRN EACH: 5; 325 TABLET ORAL at 10:09

## 2021-03-28 RX ADMIN — Medication SCH MG: at 08:17

## 2021-03-28 RX ADMIN — IPRATROPIUM BROMIDE AND ALBUTEROL SULFATE SCH ML: .5; 3 SOLUTION RESPIRATORY (INHALATION) at 10:58

## 2021-03-28 RX ADMIN — METOPROLOL TARTRATE SCH MG: 25 TABLET, FILM COATED ORAL at 21:25

## 2021-03-28 RX ADMIN — IPRATROPIUM BROMIDE AND ALBUTEROL SULFATE SCH ML: .5; 3 SOLUTION RESPIRATORY (INHALATION) at 20:23

## 2021-03-28 RX ADMIN — ATORVASTATIN CALCIUM SCH MG: 80 TABLET, FILM COATED ORAL at 08:11

## 2021-03-28 RX ADMIN — FOLIC ACID SCH MG: 1 TABLET ORAL at 08:11

## 2021-03-28 RX ADMIN — INSULIN ASPART SCH UNIT: 100 INJECTION, SOLUTION INTRAVENOUS; SUBCUTANEOUS at 12:22

## 2021-03-28 RX ADMIN — DOCUSATE SODIUM AND SENNOSIDES SCH EACH: 50; 8.6 TABLET ORAL at 21:26

## 2021-03-28 RX ADMIN — INSULIN ASPART SCH UNIT: 100 INJECTION, SOLUTION INTRAVENOUS; SUBCUTANEOUS at 21:25

## 2021-03-28 RX ADMIN — ASPIRIN 325 MG ORAL TABLET SCH MG: 325 PILL ORAL at 08:07

## 2021-03-28 RX ADMIN — INSULIN ASPART SCH: 100 INJECTION, SOLUTION INTRAVENOUS; SUBCUTANEOUS at 21:59

## 2021-03-28 RX ADMIN — HEPARIN SODIUM SCH UNIT: 5000 INJECTION, SOLUTION INTRAVENOUS; SUBCUTANEOUS at 15:57

## 2021-03-28 RX ADMIN — KETOROLAC TROMETHAMINE SCH MG: 15 INJECTION, SOLUTION INTRAMUSCULAR; INTRAVENOUS at 12:23

## 2021-03-28 RX ADMIN — NOREPINEPHRINE BITARTRATE SCH MLS/HR: 1 INJECTION, SOLUTION, CONCENTRATE INTRAVENOUS at 01:17

## 2021-03-28 RX ADMIN — CEFAZOLIN SCH MLS/HR: 330 INJECTION, POWDER, FOR SOLUTION INTRAMUSCULAR; INTRAVENOUS at 01:11

## 2021-03-28 RX ADMIN — HYDROCODONE BITARTRATE AND ACETAMINOPHEN PRN EACH: 5; 325 TABLET ORAL at 01:03

## 2021-03-28 RX ADMIN — MUPIROCIN SCH APPLIC: 20 OINTMENT TOPICAL at 08:14

## 2021-03-28 RX ADMIN — ONDANSETRON PRN MG: 2 INJECTION INTRAMUSCULAR; INTRAVENOUS at 10:09

## 2021-03-28 RX ADMIN — KETOROLAC TROMETHAMINE SCH MG: 15 INJECTION, SOLUTION INTRAMUSCULAR; INTRAVENOUS at 05:30

## 2021-03-28 RX ADMIN — INSULIN ASPART SCH UNIT: 100 INJECTION, SOLUTION INTRAVENOUS; SUBCUTANEOUS at 09:10

## 2021-03-28 RX ADMIN — SODIUM CHLORIDE, PRESERVATIVE FREE SCH ML: 5 INJECTION INTRAVENOUS at 21:26

## 2021-03-28 RX ADMIN — HYDROCODONE BITARTRATE AND ACETAMINOPHEN PRN EACH: 5; 325 TABLET ORAL at 14:15

## 2021-03-28 RX ADMIN — METOPROLOL TARTRATE SCH MG: 25 TABLET, FILM COATED ORAL at 09:02

## 2021-03-28 RX ADMIN — HYDROCODONE BITARTRATE AND ACETAMINOPHEN PRN EACH: 5; 325 TABLET ORAL at 17:26

## 2021-03-28 RX ADMIN — IPRATROPIUM BROMIDE AND ALBUTEROL SULFATE SCH ML: .5; 3 SOLUTION RESPIRATORY (INHALATION) at 15:27

## 2021-03-28 RX ADMIN — INSULIN ASPART SCH UNIT: 100 INJECTION, SOLUTION INTRAVENOUS; SUBCUTANEOUS at 17:26

## 2021-03-28 RX ADMIN — IPRATROPIUM BROMIDE AND ALBUTEROL SULFATE SCH ML: .5; 3 SOLUTION RESPIRATORY (INHALATION) at 07:05

## 2021-03-28 RX ADMIN — SODIUM CHLORIDE, PRESERVATIVE FREE SCH: 5 INJECTION INTRAVENOUS at 08:13

## 2021-03-28 RX ADMIN — KETOROLAC TROMETHAMINE SCH MG: 15 INJECTION, SOLUTION INTRAMUSCULAR; INTRAVENOUS at 17:27

## 2021-03-28 RX ADMIN — HYDROCODONE BITARTRATE AND ACETAMINOPHEN PRN EACH: 5; 325 TABLET ORAL at 05:03

## 2021-03-28 RX ADMIN — INSULIN ASPART SCH: 100 INJECTION, SOLUTION INTRAVENOUS; SUBCUTANEOUS at 22:00

## 2021-03-28 RX ADMIN — MUPIROCIN SCH APPLIC: 20 OINTMENT TOPICAL at 21:26

## 2021-03-28 NOTE — P.PN
Subjective


Progress Note Date: 03/28/21


Principal diagnosis: 





Triple vessel diffuse coronary artery disease, moderate to severe mitral 

regurgitation, moderate left ventricular dysfunction.  Previous medical history 

of insulin-dependent diabetes, family history of premature coronary artery 

disease, EtOH use most days without history of withdrawal, and newly diagnosed 

hyperlipidemia and severe left carotid stenosis





POD #2 coronary artery bypass grafting 4 vessels with the left internal mammary

artery to the left anterior descending artery, reverse saphenous vein graft from

the aorta to the first diagonal artery, left radial artery taken proximally from

the previous vein graft and anastomosed distally to the distal circumflex 

artery, reverse saphenous vein graft from the aorta to the right coronary 

artery, mitral valve repair with posterior annuloplasty using a #28 incomplete 

AnnuloFlex ring, exclusion of the left atrial appendage with a 35 mm AtriClip, 

graft flow measurements using the [employer] system, endoscopic harvesting of the 

left radial artery, endoscopic harvesting of the left greater saphenous vein 

from the groin to just below the knee level, intraoperative transesophageal 

echocardiogram and epi-aortic scanning.





Postoperative acute blood loss anemia, expected given hemodilution and cardiopul

monary bypass pump





Patient currently sitting up in bed in a recliner in the intensive care unit no 

acute distress.  Complains of postoperative chest pain with difficulty taking a 

deep breath, actively attempting incentive spirometry, strong productive cough. 

Remains in sinus rhythm.  Remains on low-dose levo for blood pressure.  

Mediastinal chest tubes, left pleural chest tube, right internal jugular Cordis,

right radial arterial line remain.  Patient needs a lot of encouragement to 

cough and deep breathe, ambulate.  No other new concerns.





Objective





- Vital Signs


Vital signs: 


                                   Vital Signs











Temp  98 F   03/28/21 04:00


 


Pulse  81   03/28/21 07:06


 


Resp  18   03/28/21 07:06


 


BP  103/70   03/28/21 06:00


 


Pulse Ox  98   03/28/21 07:06








                                 Intake & Output











 03/27/21 03/28/21 03/28/21





 18:59 06:59 18:59


 


Intake Total 1830.688 502.571 


 


Output Total 652 570 


 


Balance 1178.688 -67.429 


 


Weight 89.9 kg 88.6 kg 


 


Intake:   


 


   312 


 


    .9NS Pressure Bag 108 72 


 


    Cardiac Output 90  


 


    Sodium Chloride 0.9% 1, 540 240 





    000 ml @ 20 mls/hr IV .   





    Q24H [facility] Rx#:[id number]   


 


    ceFAZolin 2 gm In Sodium 50  





    Chloride 0.9% 50 ml @ 100   





    mls/hr IVPB Q8HR [facility] Rx#   





    :[id number]   


 


  Intake, IV Titration 802.688 190.571 





  Amount   


 


    Albumin Human 5% 250 ml @ 250  





    0 mls/hr IVPB .STK-MED   





    [facility] Rx#:[id number]   


 


    Insulin Regular 100 unit 56.021 63.571 





    In Sodium Chloride 0.9%   





    100 ml @ Per Protocol IV   





    .Q0M [facility] Rx#:[id number]   


 


    Norepinephrine 4 mg In 496.667 127.000 





    Sodium Chloride 0.9% 250   





    ml @ 0.05 MCG/KG/MIN 16.   





    345 mls/hr IV .[id number]   





    [facility] Rx#:[id number]   


 


  Oral 240  


 


Output:   


 


  Chest Tube Drainage 180 280 


 


    Left Pleural Chest tube 130 250 


 


    Mediastinal Chest Tubes X 50 30 





    2   


 


  Drainage 0  


 


    Left Wrist 0  


 


  Urine 472 290 


 


Other:   


 


  Voiding Method Indwelling Catheter Indwelling Catheter 








                       ABP, PAP, CO, CI - Last Documented











Arterial Blood Pressure        109/55


 


Pulmonary Artery Pressure      30/15


 


Cardiac Output                 5.3


 


Cardiac Index                  2.6

















- Exam





CONSTITUTIONAL: Appears comfortable, cooperative, no acute distress


RESPIRATORY:  Lungs sounds diminished bilaterally.  Respirations even, nonla

bored.  Currently on 2 L nasal cannula with oxygen saturation 97%.  Able to 

achieve 750-1000 mL on incentive spirometry.  Strong productive cough.  


CARDIOVASCULAR:  S1, S2 present.  Regular rate and rhythm, sinus rhythm on 

telemetry.  Sternum stable.   Palpable peripheral pulses bilaterally.  Gener

alized edema present.  No calf pain or tenderness noted.  Heart hugger in place 

with patient demonstrating appropriate use.  Antiembolism stockings, SCDs 

present.


GASTROINTESTINAL:  Abdomen soft, nontender, nondistended.  Hypoactive bowel 

sounds present 4 quadrants.  Tolerating diet.  Positive flatus.


GENITOURINARY:  Ramirez present draining clear, yellow urine.  Output overnight 

20-40 mL per hour


INTEGUMENTARY:  Skin is warm and dry with evidence of good perfusion.  Anterior 

chest incision well approximated and covered with dry intact dressing.  EVH site

well approximated without redness or drainage.  Left radial artery harvest site 

well approximated, patient denies numbness or tingling, able to wiggle all 

fingers and  appropriately, good cap refill.


NEUROLOGIC:  Cranial nerves II through XII intact


MUSKULOSKELETAL:  Able to move all extremities, strength equal bilaterally


PSYCHIATRIC:  Alert and oriented to person place and time, appropriate affect, 

intact judgment and insight


INVASIVE LINES AND TUBES:  Mediastinal/left/right pleural chest tubes present 

and connected to wall suction, no air leaks present.  Mediastinal tube with 10 

mL serosanguineous drainage overnight, 100 mL in the last 24 hours.  Left 

pleural chest tube with 110 mL serosanguineous drainage overnight, 700 mL in the

last 24 hours.  A/V epicardial pacemaker wires present, connected to generator, 

backup rate 50 bpm.  Right internal jugular Cordis, right radial arterial line 

present.  Last CVP 10.








- Labs


CBC & Chem 7: 


                                 03/28/21 03:46





                                 03/28/21 03:46


Labs: 


                  Abnormal Lab Results - Last 24 Hours (Table)











  03/27/21 03/27/21 03/27/21 Range/Units





  08:07 09:19 10:15 


 


RBC     (4.30-5.90)  m/uL


 


Hgb     (13.0-17.5)  gm/dL


 


Hct     (39.0-53.0)  %


 


Neutrophils #     (1.3-7.7)  k/uL


 


Sodium     (137-145)  mmol/L


 


Chloride     ()  mmol/L


 


Carbon Dioxide     (22-30)  mmol/L


 


BUN     (9-20)  mg/dL


 


Glucose     (74-99)  mg/dL


 


POC Glucose (mg/dL)  171 H  247 H  206 H  (75-99)  mg/dL


 


Calcium     (8.4-10.2)  mg/dL


 


Total Protein     (6.3-8.2)  g/dL


 


Albumin     (3.5-5.0)  g/dL














  03/27/21 03/27/21 03/27/21 Range/Units





  11:07 13:19 14:02 


 


RBC     (4.30-5.90)  m/uL


 


Hgb     (13.0-17.5)  gm/dL


 


Hct     (39.0-53.0)  %


 


Neutrophils #     (1.3-7.7)  k/uL


 


Sodium     (137-145)  mmol/L


 


Chloride     ()  mmol/L


 


Carbon Dioxide     (22-30)  mmol/L


 


BUN     (9-20)  mg/dL


 


Glucose     (74-99)  mg/dL


 


POC Glucose (mg/dL)  173 H  146 H  154 H  (75-99)  mg/dL


 


Calcium     (8.4-10.2)  mg/dL


 


Total Protein     (6.3-8.2)  g/dL


 


Albumin     (3.5-5.0)  g/dL














  03/27/21 03/27/21 03/27/21 Range/Units





  15:17 16:19 17:11 


 


RBC     (4.30-5.90)  m/uL


 


Hgb     (13.0-17.5)  gm/dL


 


Hct     (39.0-53.0)  %


 


Neutrophils #     (1.3-7.7)  k/uL


 


Sodium     (137-145)  mmol/L


 


Chloride     ()  mmol/L


 


Carbon Dioxide     (22-30)  mmol/L


 


BUN     (9-20)  mg/dL


 


Glucose     (74-99)  mg/dL


 


POC Glucose (mg/dL)  138 H  145 H  156 H  (75-99)  mg/dL


 


Calcium     (8.4-10.2)  mg/dL


 


Total Protein     (6.3-8.2)  g/dL


 


Albumin     (3.5-5.0)  g/dL














  03/27/21 03/27/21 03/27/21 Range/Units





  18:29 20:57 23:24 


 


RBC     (4.30-5.90)  m/uL


 


Hgb     (13.0-17.5)  gm/dL


 


Hct     (39.0-53.0)  %


 


Neutrophils #     (1.3-7.7)  k/uL


 


Sodium     (137-145)  mmol/L


 


Chloride     ()  mmol/L


 


Carbon Dioxide     (22-30)  mmol/L


 


BUN     (9-20)  mg/dL


 


Glucose     (74-99)  mg/dL


 


POC Glucose (mg/dL)  194 H  160 H  141 H  (75-99)  mg/dL


 


Calcium     (8.4-10.2)  mg/dL


 


Total Protein     (6.3-8.2)  g/dL


 


Albumin     (3.5-5.0)  g/dL














  03/28/21 03/28/21 03/28/21 Range/Units





  01:07 03:42 03:46 


 


RBC    2.41 L  (4.30-5.90)  m/uL


 


Hgb    7.8 L  (13.0-17.5)  gm/dL


 


Hct    22.2 L  (39.0-53.0)  %


 


Neutrophils #    8.0 H  (1.3-7.7)  k/uL


 


Sodium     (137-145)  mmol/L


 


Chloride     ()  mmol/L


 


Carbon Dioxide     (22-30)  mmol/L


 


BUN     (9-20)  mg/dL


 


Glucose     (74-99)  mg/dL


 


POC Glucose (mg/dL)  132 H  133 H   (75-99)  mg/dL


 


Calcium     (8.4-10.2)  mg/dL


 


Total Protein     (6.3-8.2)  g/dL


 


Albumin     (3.5-5.0)  g/dL














  03/28/21 03/28/21 Range/Units





  03:46 06:22 


 


RBC    (4.30-5.90)  m/uL


 


Hgb    (13.0-17.5)  gm/dL


 


Hct    (39.0-53.0)  %


 


Neutrophils #    (1.3-7.7)  k/uL


 


Sodium  136 L   (137-145)  mmol/L


 


Chloride  108 H   ()  mmol/L


 


Carbon Dioxide  21 L   (22-30)  mmol/L


 


BUN  21 H   (9-20)  mg/dL


 


Glucose  126 H   (74-99)  mg/dL


 


POC Glucose (mg/dL)   148 H  (75-99)  mg/dL


 


Calcium  7.5 L   (8.4-10.2)  mg/dL


 


Total Protein  5.2 L   (6.3-8.2)  g/dL


 


Albumin  3.0 L   (3.5-5.0)  g/dL














- Imaging and Cardiology


Chest x-ray: image reviewed





Assessment and Plan


Assessment: 





1.  Triple-vessel diffuse coronary artery disease, non-STEMI this admission, 

status post four-vessel CABG


2.  Moderate to severe predominantly centrally directed mitral regurgitation, 

status post mitral valve repair


3.  Insulin-dependent diabetes, hemoglobin A1c 8.2%


4.  Hyperlipidemia, cholesterol 241, triglyceride 200, 


5.  Left internal carotid artery stenosis, greater than 70% per carotid Doppler,

greater than 90% per neck CTA


6.  Lifelong nonsmoker with FEV1 58% of predicted 


7.  Family history of premature coronary artery disease, father and 2 brothers 

diagnosed with CAD with subsequent CABG in their early to mid 50s


8.  EtOH use most days without history of withdrawal


9.  Postoperative acute blood loss anemia, expected


Plan: 





1.  Continue aspirin, statin, Plavix, beta blocker therapy.  Will increase beta 

blocker therapy as tolerated.  


2.  Wean levo as tolerated


3.  Continue low-dose CCB for radial artery spasm


4.  Wean O2 as tolerated.  Encourage incentive spirometry use 10 times every 

hour while awake


5.  Increase activity, ambulate as tolerated.  PT/OT/cardiac rehab consulted


6.  Will monitor daily labs and x-rays.  Electrolyte replacement per protocol.  

No transfusion


7.  GI/DVT prophylaxis


8.  Pain control with current medication regimen


9.  Insulin management per primary care service.  Needs tight blood sugar 

control for healing


10.  [facility] protocol to monitor for alcohol withdrawal.  Continue thiamine, folic 

acid


11.  Left carotid stenosis intervention to be completed in the future.  Avoid 

hypotension


12.  Will discontinue mediastinal chest tubes, continue left pleural chest tubes

for 24 hours


13.  Discontinue Ramirez.  May bladder scan and straight cath for greater than 300

mL residual 


14.  Strict accurate intake and output.  Daily weights.


15.  More recommendations to follow depending on patient's progress


Time with Patient: Greater than 30

## 2021-03-28 NOTE — P.PN
Subjective


Progress Note Date: 03/28/21





HISTORY OF PRESENT ILLNESS: 





3/27/2021


Patient is status post CABG 4: left internal mammary artery to the left 

anterior descending artery, left radial artery to the distal circumflex, reverse

saphenous vein graft from the aorta to the first diagonal artery, reverse 

saphenous vein graft from the aorta to the right coronary artery. patient 

examined this morning the intensive care unit.  Patient was extubated yesterday.

 He is currently sitting up in the chair.  Patient did become slightly 

hypotensive this morning and is on vasopressors.  Patient reports mild surgical 

discomfort in the chest.  He states his breathing does not feel labored.  He 

does not get short of breath.  Patient does report difficulty taking a deep 

breath due to pain.  He is pulling 5007 150 mL on his incentive spirometer. he 

is maintaining sinus mechanism on telemetry.





3/28/2021


Patient examined this morning in the intensive care unit.  Patient is sitting up

in the chair.  He continues to report post surgical pain but states it is 

tolerable at this time.  He reports using his incentive spirometer and pulling 

065859 mL.  He remains in sinus mechanism on telemetry.  He has been weaned off

of his vasopressors.





PHYSICAL EXAM: 


VITAL SIGNS: Reviewed.


GENERAL: Well-developed in no acute distress. 


NECK: Supple. No JVD or thyromegaly


LUNGS: Respirations even and unlabored. Lung sounds diminished bilaterally


HEART: Regular rate and rhythm.  S1 and S2 heard. Chest tubes noted.


EXTREMITIES: Normal range of motion.  No clubbing or cyanosis.  Peripheral 

pulses intact.  trace bilateral lower extremity edema





ASSESSMENT: 


Non-STEMI, triple vessel coronary artery disease status post CABG 4


Moderate to severe mitral regurgitation, status post mitral valve repair


Left internal carotid artery stenosis, greater than 70% per carotid Doppler, 

greater than 90% per neck CTA


Diabetes mellitus


Daily alcohol intake


Dyslipidemia


Family history of premature coronary artery disease


Acute blood loss anemia





PLAN: 


Continue postoperative management per CTS


Continue aspirin, plavix, metoprolol, atorvastatin, and amlodipine


increase activity as tolerated


Encourage use of incentive spirometry


further recommendations pending patient's course








Nurse practitioner note has been reviewed by physician. Signing provider agrees 

with the documented findings, assessment, and plan of care. 








Objective





- Vital Signs


Vital signs: 


                                   Vital Signs











Temp  98.3 F   03/28/21 07:30


 


Pulse  84   03/28/21 11:08


 


Resp  15   03/28/21 11:08


 


BP  106/71   03/28/21 11:00


 


Pulse Ox  99   03/28/21 11:00








                                 Intake & Output











 03/27/21 03/28/21 03/28/21





 18:59 06:59 18:59


 


Intake Total 1830.688 502.571 124


 


Output Total 652 570 183


 


Balance 1178.688 -67.429 -59


 


Weight 89.9 kg 88.6 kg 


 


Intake:   


 


   312 124


 


    .9NS Pressure Bag 108 72 24


 


    Cardiac Output 90  


 


    Sodium Chloride 0.9% 1, 540 240 100





    000 ml @ 20 mls/hr IV .   





    Q24H [facility] Rx#:[id number]   


 


    ceFAZolin 2 gm In Sodium 50  





    Chloride 0.9% 50 ml @ 100   





    mls/hr IVPB Q8HR [facility] Rx#   





    :[id number]   


 


  Intake, IV Titration 802.688 190.571 





  Amount   


 


    Albumin Human 5% 250 ml @ 250  





    0 mls/hr IVPB .STK-MED   





    [facility] Rx#:[id number]   


 


    Insulin Regular 100 unit 56.021 63.571 





    In Sodium Chloride 0.9%   





    100 ml @ Per Protocol IV   





    .Q0M [facility] Rx#:[id number]   


 


    Norepinephrine 4 mg In 496.667 127.000 





    Sodium Chloride 0.9% 250   





    ml @ 0.05 MCG/KG/MIN 16.   





    345 mls/hr IV .[id number]   





    [facility] Rx#:[id number]   


 


  Oral 240  


 


Output:   


 


  Chest Tube Drainage 180 280 80


 


    Left Pleural Chest tube 130 250 80


 


    Mediastinal Chest Tubes X 50 30 0





    2   


 


  Drainage 0  


 


    Left Wrist 0  


 


  Urine 472 290 103


 


Other:   


 


  Voiding Method Indwelling Catheter Indwelling Catheter 








                       ABP, PAP, CO, CI - Last Documented











Arterial Blood Pressure        81/54


 


Pulmonary Artery Pressure      30/15


 


Cardiac Output                 5.3


 


Cardiac Index                  2.6

















- Labs


CBC & Chem 7: 


                                 03/28/21 03:46





                                 03/28/21 03:46


Labs: 


                  Abnormal Lab Results - Last 24 Hours (Table)











  03/27/21 03/27/21 03/27/21 Range/Units





  13:19 14:02 15:17 


 


RBC     (4.30-5.90)  m/uL


 


Hgb     (13.0-17.5)  gm/dL


 


Hct     (39.0-53.0)  %


 


Neutrophils #     (1.3-7.7)  k/uL


 


Sodium     (137-145)  mmol/L


 


Chloride     ()  mmol/L


 


Carbon Dioxide     (22-30)  mmol/L


 


BUN     (9-20)  mg/dL


 


Glucose     (74-99)  mg/dL


 


POC Glucose (mg/dL)  146 H  154 H  138 H  (75-99)  mg/dL


 


Calcium     (8.4-10.2)  mg/dL


 


Total Protein     (6.3-8.2)  g/dL


 


Albumin     (3.5-5.0)  g/dL














  03/27/21 03/27/21 03/27/21 Range/Units





  16:19 17:11 18:29 


 


RBC     (4.30-5.90)  m/uL


 


Hgb     (13.0-17.5)  gm/dL


 


Hct     (39.0-53.0)  %


 


Neutrophils #     (1.3-7.7)  k/uL


 


Sodium     (137-145)  mmol/L


 


Chloride     ()  mmol/L


 


Carbon Dioxide     (22-30)  mmol/L


 


BUN     (9-20)  mg/dL


 


Glucose     (74-99)  mg/dL


 


POC Glucose (mg/dL)  145 H  156 H  194 H  (75-99)  mg/dL


 


Calcium     (8.4-10.2)  mg/dL


 


Total Protein     (6.3-8.2)  g/dL


 


Albumin     (3.5-5.0)  g/dL














  03/27/21 03/27/21 03/28/21 Range/Units





  20:57 23:24 01:07 


 


RBC     (4.30-5.90)  m/uL


 


Hgb     (13.0-17.5)  gm/dL


 


Hct     (39.0-53.0)  %


 


Neutrophils #     (1.3-7.7)  k/uL


 


Sodium     (137-145)  mmol/L


 


Chloride     ()  mmol/L


 


Carbon Dioxide     (22-30)  mmol/L


 


BUN     (9-20)  mg/dL


 


Glucose     (74-99)  mg/dL


 


POC Glucose (mg/dL)  160 H  141 H  132 H  (75-99)  mg/dL


 


Calcium     (8.4-10.2)  mg/dL


 


Total Protein     (6.3-8.2)  g/dL


 


Albumin     (3.5-5.0)  g/dL














  03/28/21 03/28/21 03/28/21 Range/Units





  03:42 03:46 03:46 


 


RBC   2.41 L   (4.30-5.90)  m/uL


 


Hgb   7.8 L   (13.0-17.5)  gm/dL


 


Hct   22.2 L   (39.0-53.0)  %


 


Neutrophils #   8.0 H   (1.3-7.7)  k/uL


 


Sodium    136 L  (137-145)  mmol/L


 


Chloride    108 H  ()  mmol/L


 


Carbon Dioxide    21 L  (22-30)  mmol/L


 


BUN    21 H  (9-20)  mg/dL


 


Glucose    126 H  (74-99)  mg/dL


 


POC Glucose (mg/dL)  133 H    (75-99)  mg/dL


 


Calcium    7.5 L  (8.4-10.2)  mg/dL


 


Total Protein    5.2 L  (6.3-8.2)  g/dL


 


Albumin    3.0 L  (3.5-5.0)  g/dL














  03/28/21 03/28/21 Range/Units





  06:22 11:53 


 


RBC    (4.30-5.90)  m/uL


 


Hgb    (13.0-17.5)  gm/dL


 


Hct    (39.0-53.0)  %


 


Neutrophils #    (1.3-7.7)  k/uL


 


Sodium    (137-145)  mmol/L


 


Chloride    ()  mmol/L


 


Carbon Dioxide    (22-30)  mmol/L


 


BUN    (9-20)  mg/dL


 


Glucose    (74-99)  mg/dL


 


POC Glucose (mg/dL)  148 H  244 H  (75-99)  mg/dL


 


Calcium    (8.4-10.2)  mg/dL


 


Total Protein    (6.3-8.2)  g/dL


 


Albumin    (3.5-5.0)  g/dL

## 2021-03-28 NOTE — P.PN
Subjective


Progress Note Date: 03/28/21


Principal diagnosis: 





Myocardial infarction





Feeling well. No complaints. No pain or sob. No overnight events. He was taken 

off levo gtt today per nursing. 





Objective





- Vital Signs


Vital signs: 


                                   Vital Signs











Temp  98 F   03/28/21 12:00


 


Pulse  86   03/28/21 13:00


 


Resp  17   03/28/21 13:00


 


BP  110/64   03/28/21 13:00


 


Pulse Ox  90 L  03/28/21 13:00








                                 Intake & Output











 03/27/21 03/28/21 03/28/21





 18:59 06:59 18:59


 


Intake Total 1830.688 502.571 170


 


Output Total 652 570 265


 


Balance 1178.688 -67.429 -95


 


Weight 89.9 kg 88.6 kg 


 


Intake:   


 


   312 170


 


    .9NS Pressure Bag 108 72 30


 


    Cardiac Output 90  


 


    Sodium Chloride 0.9% 1, 540 240 140





    000 ml @ 20 mls/hr IV .   





    Q24H [facility] Rx#:[id number]   


 


    ceFAZolin 2 gm In Sodium 50  





    Chloride 0.9% 50 ml @ 100   





    mls/hr IVPB Q8HR [facility] Rx#   





    :[id number]   


 


  Intake, IV Titration 802.688 190.571 





  Amount   


 


    Albumin Human 5% 250 ml @ 250  





    0 mls/hr IVPB .STK-MED   





    [facility] Rx#:[id number]   


 


    Insulin Regular 100 unit 56.021 63.571 





    In Sodium Chloride 0.9%   





    100 ml @ Per Protocol IV   





    .Q0M [facility] Rx#:[id number]   


 


    Norepinephrine 4 mg In 496.667 127.000 





    Sodium Chloride 0.9% 250   





    ml @ 0.05 MCG/KG/MIN 16.   





    345 mls/hr IV .[id number]   





    [facility] Rx#:[id number]   


 


  Oral 240  


 


Output:   


 


  Chest Tube Drainage 180 280 100


 


    Left Pleural Chest tube 130 250 100


 


    Mediastinal Chest Tubes X 50 30 0





    2   


 


  Drainage 0  


 


    Left Wrist 0  


 


  Urine 472 290 165


 


Other:   


 


  Voiding Method Indwelling Catheter Indwelling Catheter 








                       ABP, PAP, CO, CI - Last Documented











Arterial Blood Pressure        81/54


 


Pulmonary Artery Pressure      30/15


 


Cardiac Output                 5.3


 


Cardiac Index                  2.6

















- Exam





Gen: No acute distress, extubated


HEENT: normocephalic, atraumatic, moist mucous membranes


Resp: Clear bilaterally


CVS: good distal perfusion x 4, regular rate and rhythm without murmurs


GI: soft, NTTP, ND, appropriate bowel sounds


: no SPT, no CVAT, gonzalez catheter is present


MSK: no pitting edema, no clubbing


Neuro: non-focal, moving all extremities





- Labs


CBC & Chem 7: 


                                 03/28/21 03:46





                                 03/28/21 03:46


Labs: 


                  Abnormal Lab Results - Last 24 Hours (Table)











  03/27/21 03/27/21 03/27/21 Range/Units





  14:02 15:17 16:19 


 


RBC     (4.30-5.90)  m/uL


 


Hgb     (13.0-17.5)  gm/dL


 


Hct     (39.0-53.0)  %


 


Neutrophils #     (1.3-7.7)  k/uL


 


Sodium     (137-145)  mmol/L


 


Chloride     ()  mmol/L


 


Carbon Dioxide     (22-30)  mmol/L


 


BUN     (9-20)  mg/dL


 


Glucose     (74-99)  mg/dL


 


POC Glucose (mg/dL)  154 H  138 H  145 H  (75-99)  mg/dL


 


Calcium     (8.4-10.2)  mg/dL


 


Total Protein     (6.3-8.2)  g/dL


 


Albumin     (3.5-5.0)  g/dL














  03/27/21 03/27/21 03/27/21 Range/Units





  17:11 18:29 20:57 


 


RBC     (4.30-5.90)  m/uL


 


Hgb     (13.0-17.5)  gm/dL


 


Hct     (39.0-53.0)  %


 


Neutrophils #     (1.3-7.7)  k/uL


 


Sodium     (137-145)  mmol/L


 


Chloride     ()  mmol/L


 


Carbon Dioxide     (22-30)  mmol/L


 


BUN     (9-20)  mg/dL


 


Glucose     (74-99)  mg/dL


 


POC Glucose (mg/dL)  156 H  194 H  160 H  (75-99)  mg/dL


 


Calcium     (8.4-10.2)  mg/dL


 


Total Protein     (6.3-8.2)  g/dL


 


Albumin     (3.5-5.0)  g/dL














  03/27/21 03/28/21 03/28/21 Range/Units





  23:24 01:07 03:42 


 


RBC     (4.30-5.90)  m/uL


 


Hgb     (13.0-17.5)  gm/dL


 


Hct     (39.0-53.0)  %


 


Neutrophils #     (1.3-7.7)  k/uL


 


Sodium     (137-145)  mmol/L


 


Chloride     ()  mmol/L


 


Carbon Dioxide     (22-30)  mmol/L


 


BUN     (9-20)  mg/dL


 


Glucose     (74-99)  mg/dL


 


POC Glucose (mg/dL)  141 H  132 H  133 H  (75-99)  mg/dL


 


Calcium     (8.4-10.2)  mg/dL


 


Total Protein     (6.3-8.2)  g/dL


 


Albumin     (3.5-5.0)  g/dL














  03/28/21 03/28/21 03/28/21 Range/Units





  03:46 03:46 06:22 


 


RBC  2.41 L    (4.30-5.90)  m/uL


 


Hgb  7.8 L    (13.0-17.5)  gm/dL


 


Hct  22.2 L    (39.0-53.0)  %


 


Neutrophils #  8.0 H    (1.3-7.7)  k/uL


 


Sodium   136 L   (137-145)  mmol/L


 


Chloride   108 H   ()  mmol/L


 


Carbon Dioxide   21 L   (22-30)  mmol/L


 


BUN   21 H   (9-20)  mg/dL


 


Glucose   126 H   (74-99)  mg/dL


 


POC Glucose (mg/dL)    148 H  (75-99)  mg/dL


 


Calcium   7.5 L   (8.4-10.2)  mg/dL


 


Total Protein   5.2 L   (6.3-8.2)  g/dL


 


Albumin   3.0 L   (3.5-5.0)  g/dL














  03/28/21 Range/Units





  11:53 


 


RBC   (4.30-5.90)  m/uL


 


Hgb   (13.0-17.5)  gm/dL


 


Hct   (39.0-53.0)  %


 


Neutrophils #   (1.3-7.7)  k/uL


 


Sodium   (137-145)  mmol/L


 


Chloride   ()  mmol/L


 


Carbon Dioxide   (22-30)  mmol/L


 


BUN   (9-20)  mg/dL


 


Glucose   (74-99)  mg/dL


 


POC Glucose (mg/dL)  244 H  (75-99)  mg/dL


 


Calcium   (8.4-10.2)  mg/dL


 


Total Protein   (6.3-8.2)  g/dL


 


Albumin   (3.5-5.0)  g/dL














Assessment and Plan


Plan: 





NSTEMI S/P CABGX4 3/26


-Cardio and CT surgery following


-Telemetry monitoring.


-Continue daily aspirin, atorvastatin, and metoprolol.


-Lipid profile revealed elevation of triglycerides 200, cholesterol 241, LDL 

164, HDL of 37.  Atorvastatin increased to 80 mg nightly.





Insulin-dependent diabetes mellitus type II


-D/c insulin drip


-Start SSI and lantus 20 units daily 


-Hemoglobin A1c = 8.2%





CODE STATUS: Full code


DVT prophylaxis: Heparin


Discussed with: Patient


Anticipated discharge date: 3-4 days


Anticipated discharge place: Home

## 2021-03-28 NOTE — XR
EXAMINATION TYPE: XR chest 1V portable

 

DATE OF EXAM: 3/28/2021

 

HISTORY: Shortness of breath.

 

COMPARISON: 3/27/2021

 

TECHNIQUE: Single view of the chest is submitted.

 

FINDINGS:

Tabor-Raj catheter has been removed. Left basilar chest tube is in place. No evidence for pneumothora
x.

 

Changes of median sternotomy. Cardiomegaly. Mild pulmonary venous congestion with basilar atelectasis
 and possible small left effusion.

 

Hilar and mediastinal structures are within normal limits.  

 

Degenerative changes are seen of the dorsal spine. 

 

 IMPRESSION: 

 

1.  Postoperative changes as noted.

## 2021-03-28 NOTE — P.PN
Subjective


Progress Note Date: 03/28/21











On today's evaluation of 03/27/2021, the patient is postop day #1.  The patient 

was brought into the intensive care unit and he was hemodynamically stable and 

was extubated at around 9:15 PM yesterday.  This morning, the patient is able to

sit up on a recliner.  He remains extubated.  He remains on oxygen at 2 L per 

minute nasal cannula.  Note that since his surgery, the patient initially 

received 500 mL bolus of albumin 5%, after sitting up on a chair his blood 

pressure dropped again and we had to give another bolus of 500 mL of albumin.  

His urine output is improving.  Overnight, he was taken off the Primacor and he 

was started on levo fed initially 0.05 g and currently he is running at 0.11 

mcg/kg per minute.  Urine output is adequate for now.  BP is showing a systolic 

of 100-110.  Cardiac output is 5.4 with an index of 2.7.  No cardiac 

arrhythmias.  No atrial fibrillation.  His cardiac rhythm is sinus.  Chest tubes

are still in place.  Output from the mediastinal was 500 mL yesterday and left 

pleural is 300.  The patient has a total of 3 chest tubes left pleural, 

mediastinal 2.  The patient otherwise is doing well.  He did have a bout of 

blurred vision in his left eye yesterday without any neurologic deficit and 

currently is neurologically intact.  No aphasia.  No headaches.  No altered 

mentation.  Insulin drip is running at 1.5 units an hour and nitroglycerin drip 

drip is running at 5 units an hour regarding his radial artery harvest.  

Otherwise, no other significant events.  Chest x-ray shows adequate expansion of

both lungs.  No evidence of any pneumothorax.  There is a gastric bubble.  Upton-

Raj catheter is still in place.  Hemoglobin today is at 8.8.  Rest of the blood

work is all within normal limits.





On 03/28/2021 the patient is postop day #1.  Is able to sit up on a chair.  The 

Upton-Raj catheter has been removed.  The patient has the lower limb mediastinal

chest tubes still in place and output has been noted and output has considerably

dropped since yesterday.  He is hemodynamically requiring some pressors still 

and the patient is on 0.04 g kilogram per minute of norepinephrine infusion.  

Urine output is in order of 20 mL an hour.  The patient remains on insulin drip 

at 4.5 units an hour.  Blood sugars of been under adequate control.  Pulmonary 

status is stable.  He is on oxygen at 2 L, and he was transitioned to room air 

oxygen.  He has adequate pain control for now and this morning he refers this 

pain as being around 7 out of 10.  The chest x-ray from today showing a small 

pleural effusion on the left.  Right lung is showing some fluid within the 

fissure.  No consolidation.  No pneumothorax.  Chest tubes are all in place.  

There is some gastric bubble and the patient is passing gas and he is tolerating

his diet without any major difficulties.  Hemoglobin from today is 7.8





Objective





- Vital Signs


Vital signs: 


                                   Vital Signs











Temp  98 F   03/28/21 04:00


 


Pulse  86   03/28/21 07:18


 


Resp  20   03/28/21 07:18


 


BP  103/70   03/28/21 06:00


 


Pulse Ox  98   03/28/21 07:06








                                 Intake & Output











 03/27/21 03/28/21 03/28/21





 18:59 06:59 18:59


 


Intake Total 1830.688 502.571 26


 


Output Total 652 570 20


 


Balance 1178.688 -67.429 6


 


Weight 89.9 kg 88.6 kg 


 


Intake:   


 


   312 26


 


    .9NS Pressure Bag 108 72 6


 


    Cardiac Output 90  


 


    Sodium Chloride 0.9% 1, 540 240 20





    000 ml @ 20 mls/hr IV .   





    Q24H [facility] Rx#:[id number]   


 


    ceFAZolin 2 gm In Sodium 50  





    Chloride 0.9% 50 ml @ 100   





    mls/hr IVPB Q8HR [facility] Rx#   





    :[id number]   


 


  Intake, IV Titration 802.688 190.571 





  Amount   


 


    Albumin Human 5% 250 ml @ 250  





    0 mls/hr IVPB .STK-MED   





    [facility] Rx#:[id number]   


 


    Insulin Regular 100 unit 56.021 63.571 





    In Sodium Chloride 0.9%   





    100 ml @ Per Protocol IV   





    .Q0M [facility] Rx#:[id number]   


 


    Norepinephrine 4 mg In 496.667 127.000 





    Sodium Chloride 0.9% 250   





    ml @ 0.05 MCG/KG/MIN 16.   





    345 mls/hr IV .[id number]   





    [facility] Rx#:[id number]   


 


  Oral 240  


 


Output:   


 


  Chest Tube Drainage 180 280 


 


    Left Pleural Chest tube 130 250 


 


    Mediastinal Chest Tubes X 50 30 





    2   


 


  Drainage 0  


 


    Left Wrist 0  


 


  Urine 472 290 20


 


Other:   


 


  Voiding Method Indwelling Catheter Indwelling Catheter 








                       ABP, PAP, CO, CI - Last Documented











Arterial Blood Pressure        126/58


 


Pulmonary Artery Pressure      30/15


 


Cardiac Output                 5.3


 


Cardiac Index                  2.6

















- Exam








Gen. appearance, comfortable not in acute distress, sitting up on a chair, 

communicating, no signs of any respiratory distress.  The patient has a right IJ

Cordis and Upton-Raj catheter was removed and the patient is currently on room 

air oxygen


Neck was supple and without jugular venous distension, thyromegaly, or carotid 

bruits. Carotids were easily palpable bilaterally. There was no adenopathy.  The

patient has a right IJ Upton-Raj catheter in place.


Lungs sounds are diminished bilaterally.  The patient has 3 chest tubes, left 

pleural and 2 mediastinal chest tubes and all of them are in place.  Sternum is 

stable clean and intact.


Cardiac exam revealed the PMI to be normally situated and sized. The rhythm was 

regular and no extrasystoles were noted during several minutes of auscultation. 

The first and second heart sounds were normal and physiologic splitting of the 

second heart sound was noted. There were no murmurs, rubs, clicks, or gallops.


Abdominal exam revealed normal bowel sounds. The abdomen was soft, non-tender, 

and without masses, organomegaly, or appreciable enlargement of the abdominal 

aorta.


Examination of the extremities revealed easily palpable radial, femoral and 

pedal pulses. There was no cyanosis, clubbing or edema.  Surgical wound site 

over the left radial area is dry clean and intact.  There is a MAKAYLA drain in place

without any significant output.  Surgical wound site over the lower extremity 

are also dry clean and intact.


Examination of the skin revealed no evidence of significant rashes, suspicious 

appearing nevi or other concerning lesions.


Neurologically, the patient is awake and alert and the patient does not have any

focal neurological deficit.  Cranial nerves are essentially intact.





- Labs


CBC & Chem 7: 


                                 03/28/21 03:46





                                 03/28/21 03:46


Labs: 


                  Abnormal Lab Results - Last 24 Hours (Table)











  03/27/21 03/27/21 03/27/21 Range/Units





  08:07 09:19 10:15 


 


RBC     (4.30-5.90)  m/uL


 


Hgb     (13.0-17.5)  gm/dL


 


Hct     (39.0-53.0)  %


 


Neutrophils #     (1.3-7.7)  k/uL


 


Sodium     (137-145)  mmol/L


 


Chloride     ()  mmol/L


 


Carbon Dioxide     (22-30)  mmol/L


 


BUN     (9-20)  mg/dL


 


Glucose     (74-99)  mg/dL


 


POC Glucose (mg/dL)  171 H  247 H  206 H  (75-99)  mg/dL


 


Calcium     (8.4-10.2)  mg/dL


 


Total Protein     (6.3-8.2)  g/dL


 


Albumin     (3.5-5.0)  g/dL














  03/27/21 03/27/21 03/27/21 Range/Units





  11:07 13:19 14:02 


 


RBC     (4.30-5.90)  m/uL


 


Hgb     (13.0-17.5)  gm/dL


 


Hct     (39.0-53.0)  %


 


Neutrophils #     (1.3-7.7)  k/uL


 


Sodium     (137-145)  mmol/L


 


Chloride     ()  mmol/L


 


Carbon Dioxide     (22-30)  mmol/L


 


BUN     (9-20)  mg/dL


 


Glucose     (74-99)  mg/dL


 


POC Glucose (mg/dL)  173 H  146 H  154 H  (75-99)  mg/dL


 


Calcium     (8.4-10.2)  mg/dL


 


Total Protein     (6.3-8.2)  g/dL


 


Albumin     (3.5-5.0)  g/dL














  03/27/21 03/27/21 03/27/21 Range/Units





  15:17 16:19 17:11 


 


RBC     (4.30-5.90)  m/uL


 


Hgb     (13.0-17.5)  gm/dL


 


Hct     (39.0-53.0)  %


 


Neutrophils #     (1.3-7.7)  k/uL


 


Sodium     (137-145)  mmol/L


 


Chloride     ()  mmol/L


 


Carbon Dioxide     (22-30)  mmol/L


 


BUN     (9-20)  mg/dL


 


Glucose     (74-99)  mg/dL


 


POC Glucose (mg/dL)  138 H  145 H  156 H  (75-99)  mg/dL


 


Calcium     (8.4-10.2)  mg/dL


 


Total Protein     (6.3-8.2)  g/dL


 


Albumin     (3.5-5.0)  g/dL














  03/27/21 03/27/21 03/27/21 Range/Units





  18:29 20:57 23:24 


 


RBC     (4.30-5.90)  m/uL


 


Hgb     (13.0-17.5)  gm/dL


 


Hct     (39.0-53.0)  %


 


Neutrophils #     (1.3-7.7)  k/uL


 


Sodium     (137-145)  mmol/L


 


Chloride     ()  mmol/L


 


Carbon Dioxide     (22-30)  mmol/L


 


BUN     (9-20)  mg/dL


 


Glucose     (74-99)  mg/dL


 


POC Glucose (mg/dL)  194 H  160 H  141 H  (75-99)  mg/dL


 


Calcium     (8.4-10.2)  mg/dL


 


Total Protein     (6.3-8.2)  g/dL


 


Albumin     (3.5-5.0)  g/dL














  03/28/21 03/28/21 03/28/21 Range/Units





  01:07 03:42 03:46 


 


RBC    2.41 L  (4.30-5.90)  m/uL


 


Hgb    7.8 L  (13.0-17.5)  gm/dL


 


Hct    22.2 L  (39.0-53.0)  %


 


Neutrophils #    8.0 H  (1.3-7.7)  k/uL


 


Sodium     (137-145)  mmol/L


 


Chloride     ()  mmol/L


 


Carbon Dioxide     (22-30)  mmol/L


 


BUN     (9-20)  mg/dL


 


Glucose     (74-99)  mg/dL


 


POC Glucose (mg/dL)  132 H  133 H   (75-99)  mg/dL


 


Calcium     (8.4-10.2)  mg/dL


 


Total Protein     (6.3-8.2)  g/dL


 


Albumin     (3.5-5.0)  g/dL














  03/28/21 03/28/21 Range/Units





  03:46 06:22 


 


RBC    (4.30-5.90)  m/uL


 


Hgb    (13.0-17.5)  gm/dL


 


Hct    (39.0-53.0)  %


 


Neutrophils #    (1.3-7.7)  k/uL


 


Sodium  136 L   (137-145)  mmol/L


 


Chloride  108 H   ()  mmol/L


 


Carbon Dioxide  21 L   (22-30)  mmol/L


 


BUN  21 H   (9-20)  mg/dL


 


Glucose  126 H   (74-99)  mg/dL


 


POC Glucose (mg/dL)   148 H  (75-99)  mg/dL


 


Calcium  7.5 L   (8.4-10.2)  mg/dL


 


Total Protein  5.2 L   (6.3-8.2)  g/dL


 


Albumin  3.0 L   (3.5-5.0)  g/dL














Assessment and Plan


Plan: 








#1.  Multivessel coronary artery disease, status post four-vessel coronary 

artery bypass grafting, with LIMA to the LAD, left radial artery graft to the 

circumflex, SVG to the diagonal, SVG to the PDA, and mitral valve repair, and 

left atrial appendage exclusion, postoperative day #2  the patient was extubated

, currently on room air oxygen.  The patient did have some low cardiac output 

postop which gradually improved and the patient's Upton-Raj catheter was removed

and is running a very low dose of norepinephrine feeding infusion for now which 

will be hopefully weaned off and removed.  His blood pressure has improved.  Is 

producing adequate amount of urine output.  He is able to sit up on a chair.  No

active issues for now Chest x-ray was reviewed and the chest tube output was 

noted.





#2.   Thoracotomy, chest tubes are all in place.  Currently on RA.  IS pulling 

approximately 7 50 mL-1000cc





#3.  Acute non-ST elevated myocardial infarction





#4.  Multivessel coronary artery disease,  





#5.  Moderate to severe mitral regurgitation of 3+, posterior repair





#6.  Diabetes mellitus type 2, insulin dependent, currently on insulin drip at 

4.5 units an hour





#7.  Lifetime nonsmoker, preop FEV1  58% possibly related to restrictive 

alveolar pattern, related to interstitial edema





#8.   CHF with systolic dysfunction, and mild to moderate impairment of left 

ventricular systolic function EF of 40-45%





#9.   Anemia with a postoperative drop in hemoglobin down to 8.8, expected 

outcome of surgery





#10.  Left internal carotid stenosis of greater than 70%





#11.  Regular EtOH intake of 3-4 alcoholic drinks on a daily basis and no signs 

of any delirium tremens for now





#12.  Hyperlipidemia





Plan








Monitor blood pressure and urine output.  Wean off norepinephrine for now.  

Would like to keep systolic above 110.  No issues with any focal neurological 

deficit at this point in time.


He is on aspirin and Plavix for now


Gradually wean off norepinephrine infusion and Metamucil drip can be 

discontinued today


Hemodynamic parameters are adequate for now and a cardiac rhythm is sinus


chest tubes management per surgery


Continue with insulin drip for today.  


Encouraged use of incentive spirometer


Norco for pain control and continue using incentive spirometer


Vital breakfast, start the patient on 20 units of Lantus insulin plus a scale 

and discontinue the insulin drip


Obviously keep the patient ICU for today.  Chest x-ray was noted.

## 2021-03-29 LAB
ALBUMIN SERPL-MCNC: 2.9 G/DL (ref 3.5–5)
ALP SERPL-CCNC: 64 U/L (ref 38–126)
ALT SERPL-CCNC: 18 U/L (ref 4–49)
ANION GAP SERPL CALC-SCNC: 7 MMOL/L
APTT BLD: 25 SEC (ref 22–30)
AST SERPL-CCNC: 45 U/L (ref 17–59)
BASOPHILS # BLD AUTO: 0 K/UL (ref 0–0.2)
BASOPHILS NFR BLD AUTO: 0 %
BUN SERPL-SCNC: 36 MG/DL (ref 9–20)
CALCIUM SPEC-MCNC: 7.4 MG/DL (ref 8.4–10.2)
CHLORIDE SERPL-SCNC: 106 MMOL/L (ref 98–107)
CO2 SERPL-SCNC: 18 MMOL/L (ref 22–30)
EOSINOPHIL # BLD AUTO: 0.1 K/UL (ref 0–0.7)
EOSINOPHIL NFR BLD AUTO: 2 %
ERYTHROCYTE [DISTWIDTH] IN BLOOD BY AUTOMATED COUNT: 2.16 M/UL (ref 4.3–5.9)
ERYTHROCYTE [DISTWIDTH] IN BLOOD: 12.9 % (ref 11.5–15.5)
GLUCOSE BLD-MCNC: 144 MG/DL (ref 75–99)
GLUCOSE BLD-MCNC: 155 MG/DL (ref 75–99)
GLUCOSE BLD-MCNC: 218 MG/DL (ref 75–99)
GLUCOSE BLD-MCNC: 218 MG/DL (ref 75–99)
GLUCOSE BLD-MCNC: 279 MG/DL (ref 75–99)
GLUCOSE BLD-MCNC: 282 MG/DL (ref 75–99)
GLUCOSE SERPL-MCNC: 214 MG/DL (ref 74–99)
HCT VFR BLD AUTO: 20.5 % (ref 39–53)
HGB BLD-MCNC: 7 GM/DL (ref 13–17.5)
INR PPP: 1 (ref ?–1.2)
LYMPHOCYTES # SPEC AUTO: 0.5 K/UL (ref 1–4.8)
LYMPHOCYTES NFR SPEC AUTO: 9 %
MCH RBC QN AUTO: 32.5 PG (ref 25–35)
MCHC RBC AUTO-ENTMCNC: 34.2 G/DL (ref 31–37)
MCV RBC AUTO: 95 FL (ref 80–100)
MONOCYTES # BLD AUTO: 0.3 K/UL (ref 0–1)
MONOCYTES NFR BLD AUTO: 5 %
NEUTROPHILS # BLD AUTO: 5.3 K/UL (ref 1.3–7.7)
NEUTROPHILS NFR BLD AUTO: 83 %
PLATELET # BLD AUTO: 154 K/UL (ref 150–450)
POTASSIUM SERPL-SCNC: 4.9 MMOL/L (ref 3.5–5.1)
PROT SERPL-MCNC: 5.1 G/DL (ref 6.3–8.2)
PT BLD: 10.8 SEC (ref 9–12)
SODIUM SERPL-SCNC: 131 MMOL/L (ref 137–145)
WBC # BLD AUTO: 6.3 K/UL (ref 3.8–10.6)

## 2021-03-29 RX ADMIN — INSULIN ASPART SCH UNIT: 100 INJECTION, SOLUTION INTRAVENOUS; SUBCUTANEOUS at 17:35

## 2021-03-29 RX ADMIN — Medication SCH MG: at 08:18

## 2021-03-29 RX ADMIN — IPRATROPIUM BROMIDE AND ALBUTEROL SULFATE SCH ML: .5; 3 SOLUTION RESPIRATORY (INHALATION) at 11:55

## 2021-03-29 RX ADMIN — IPRATROPIUM BROMIDE AND ALBUTEROL SULFATE SCH ML: .5; 3 SOLUTION RESPIRATORY (INHALATION) at 08:10

## 2021-03-29 RX ADMIN — INSULIN ASPART SCH UNIT: 100 INJECTION, SOLUTION INTRAVENOUS; SUBCUTANEOUS at 12:45

## 2021-03-29 RX ADMIN — CLOPIDOGREL BISULFATE SCH MG: 75 TABLET ORAL at 05:32

## 2021-03-29 RX ADMIN — HEPARIN SODIUM SCH UNIT: 5000 INJECTION, SOLUTION INTRAVENOUS; SUBCUTANEOUS at 17:35

## 2021-03-29 RX ADMIN — KETOROLAC TROMETHAMINE SCH MG: 15 INJECTION, SOLUTION INTRAMUSCULAR; INTRAVENOUS at 00:04

## 2021-03-29 RX ADMIN — INSULIN ASPART SCH UNIT: 100 INJECTION, SOLUTION INTRAVENOUS; SUBCUTANEOUS at 21:02

## 2021-03-29 RX ADMIN — PANTOPRAZOLE SODIUM SCH MG: 40 TABLET, DELAYED RELEASE ORAL at 06:59

## 2021-03-29 RX ADMIN — IPRATROPIUM BROMIDE AND ALBUTEROL SULFATE SCH: .5; 3 SOLUTION RESPIRATORY (INHALATION) at 21:42

## 2021-03-29 RX ADMIN — HYDROCODONE BITARTRATE AND ACETAMINOPHEN PRN EACH: 5; 325 TABLET ORAL at 14:38

## 2021-03-29 RX ADMIN — ATORVASTATIN CALCIUM SCH MG: 80 TABLET, FILM COATED ORAL at 08:17

## 2021-03-29 RX ADMIN — ASPIRIN 325 MG ORAL TABLET SCH MG: 325 PILL ORAL at 05:32

## 2021-03-29 RX ADMIN — INSULIN ASPART SCH: 100 INJECTION, SOLUTION INTRAVENOUS; SUBCUTANEOUS at 10:34

## 2021-03-29 RX ADMIN — METOPROLOL TARTRATE SCH MG: 25 TABLET, FILM COATED ORAL at 10:39

## 2021-03-29 RX ADMIN — HYDROCODONE BITARTRATE AND ACETAMINOPHEN PRN EACH: 5; 325 TABLET ORAL at 06:59

## 2021-03-29 RX ADMIN — IPRATROPIUM BROMIDE AND ALBUTEROL SULFATE SCH ML: .5; 3 SOLUTION RESPIRATORY (INHALATION) at 15:55

## 2021-03-29 RX ADMIN — SODIUM CHLORIDE, PRESERVATIVE FREE SCH ML: 5 INJECTION INTRAVENOUS at 21:03

## 2021-03-29 RX ADMIN — HYDROCODONE BITARTRATE AND ACETAMINOPHEN PRN EACH: 5; 325 TABLET ORAL at 11:25

## 2021-03-29 RX ADMIN — INSULIN ASPART SCH UNIT: 100 INJECTION, SOLUTION INTRAVENOUS; SUBCUTANEOUS at 10:34

## 2021-03-29 RX ADMIN — HYDROCODONE BITARTRATE AND ACETAMINOPHEN PRN EACH: 7.5; 325 TABLET ORAL at 14:51

## 2021-03-29 RX ADMIN — HYDROCODONE BITARTRATE AND ACETAMINOPHEN PRN EACH: 7.5; 325 TABLET ORAL at 19:02

## 2021-03-29 RX ADMIN — FOLIC ACID SCH MG: 1 TABLET ORAL at 08:17

## 2021-03-29 RX ADMIN — HEPARIN SODIUM SCH UNIT: 5000 INJECTION, SOLUTION INTRAVENOUS; SUBCUTANEOUS at 08:17

## 2021-03-29 RX ADMIN — SODIUM CHLORIDE, PRESERVATIVE FREE SCH ML: 5 INJECTION INTRAVENOUS at 08:18

## 2021-03-29 RX ADMIN — MUPIROCIN SCH: 20 OINTMENT TOPICAL at 21:03

## 2021-03-29 RX ADMIN — INSULIN DETEMIR SCH UNIT: 100 INJECTION, SOLUTION SUBCUTANEOUS at 08:17

## 2021-03-29 RX ADMIN — HEPARIN SODIUM SCH UNIT: 5000 INJECTION, SOLUTION INTRAVENOUS; SUBCUTANEOUS at 22:53

## 2021-03-29 RX ADMIN — METOPROLOL TARTRATE SCH MG: 25 TABLET, FILM COATED ORAL at 21:03

## 2021-03-29 RX ADMIN — DOCUSATE SODIUM AND SENNOSIDES SCH EACH: 50; 8.6 TABLET ORAL at 21:02

## 2021-03-29 RX ADMIN — MUPIROCIN SCH: 20 OINTMENT TOPICAL at 17:18

## 2021-03-29 RX ADMIN — HEPARIN SODIUM SCH UNIT: 5000 INJECTION, SOLUTION INTRAVENOUS; SUBCUTANEOUS at 00:04

## 2021-03-29 RX ADMIN — HYDROCODONE BITARTRATE AND ACETAMINOPHEN PRN EACH: 7.5; 325 TABLET ORAL at 22:53

## 2021-03-29 NOTE — XR
EXAMINATION TYPE: XR chest 1V portable

 

DATE OF EXAM: 3/29/2021

 

Comparison: 3/28/2021

 

Clinical History: 51-year-old male post cardiac surgery

 

Findings:

Median sternotomy wires with postoperative clips in the mediastinum. Heart remains mildly enlarged. M
ild interstitial prominence similar. Left basilar chest tube remains in place. No appreciable pneumot
horax.

 

 

Impression:

Stable exam. Left basilar chest tube. No appreciable pneumothorax. There may be similar mild pulmonar
y vascular congestion.

## 2021-03-29 NOTE — P.PN
Subjective


Progress Note Date: 03/29/21


Principal diagnosis: 





Triple vessel diffuse coronary artery disease, moderate to severe mitral 

regurgitation, moderate left ventricular dysfunction.  Previous medical history 

of insulin-dependent diabetes, family history of premature coronary artery 

disease, EtOH use most days without history of withdrawal, and newly diagnosed 

hyperlipidemia and severe left carotid stenosis. Preop nasal swab positive for 

MSSA





POD #3 coronary artery bypass grafting 4 vessels with the left internal mammary

artery to the left anterior descending artery, reverse saphenous vein graft from

the aorta to the first diagonal artery, left radial artery taken proximally from

the previous vein graft and anastomosed distally to the distal circumflex 

artery, reverse saphenous vein graft from the aorta to the right coronary 

artery, mitral valve repair with posterior annuloplasty using a #28 incomplete 

AnnuloFlex ring, exclusion of the left atrial appendage with a 35 mm AtriClip, 

graft flow measurements using the Medistim system, endoscopic harvesting of the 

left radial artery, endoscopic harvesting of the left greater saphenous vein 

from the groin to just below the knee level, intraoperative transesophageal 

echocardiogram and epi-aortic scanning.





Postoperative acute blood loss anemia, expected given hemodilution and 

cardiopulmonary bypass pump











Patient currently sitting up in bed on the cardiac stepdown unit no acute 

distress.  Complains of postoperative chest pain controlled with current 

medication regimen, actively using incentive spirometry, strong productive 

cough.  Remains in sinus rhythm, hemodynamically stable.  Left pleural chest 

tube, epicardial pacemaker wires remain.  Apparently this morning patient had an

episode of right-sided facial droop, slurring of words, and right arm weakness. 

Code stroke was called, patient had stat CT of the brain and CTA of the head and

neck demonstrating no acute changes.  Neuro-interventionalist was contacted, 

recommendations were made for no thrombolytics.  Patient did receive his a.m. 

dose of aspirin and Plavix early.  Currently he is completely neurologically 

intact, no facial droop is present, he is oriented 3, and his speech is 

appropriate.  Neurology and speech therapy were consulted, patient remains 

nothing by mouth for swallow evaluation.





Objective





- Vital Signs


Vital signs: 


                                   Vital Signs











Temp  98.7 F   03/29/21 00:00


 


Pulse  86   03/29/21 00:00


 


Resp  18   03/29/21 00:00


 


BP  100/60   03/29/21 00:00


 


Pulse Ox  95   03/29/21 00:00








                                 Intake & Output











 03/28/21 03/29/21 03/29/21





 18:59 06:59 18:59


 


Intake Total 476  


 


Output Total 370 800 


 


Balance 106 -800 


 


Intake:   


 


    


 


    .9NS Pressure Bag 36  


 


    Sodium Chloride 0.9% 1, 200  





    000 ml @ 20 mls/hr IV .   





    Q24H [facility] Rx#:[id number]   


 


  Oral 240  


 


Output:   


 


  Chest Tube Drainage 100 200 


 


    Left Pleural Chest tube 100 200 


 


    Mediastinal Chest Tubes X 0  





    2   


 


  Urine 270 600 


 


Other:   


 


  Voiding Method Indwelling Catheter Indwelling Catheter 








                       ABP, PAP, CO, CI - Last Documented











Arterial Blood Pressure        81/54


 


Pulmonary Artery Pressure      30/15


 


Cardiac Output                 5.3


 


Cardiac Index                  2.6

















- Exam





CONSTITUTIONAL: Appears comfortable, cooperative, no acute distress


RESPIRATORY:  Lungs sounds diminished bilaterally.  Respirations even, 

nonlabored.  Currently on 2 L nasal cannula with oxygen saturation 95%.  Able to

achieve 750-1000 mL on incentive spirometry.  Strong productive cough.  


CARDIOVASCULAR:  S1, S2 present.  Regular rate and rhythm, sinus rhythm on 

telemetry.  Sternum stable.   Palpable peripheral pulses bilaterally.  

Generalized edema present.  No calf pain or tenderness noted.  Heart hugger in 

place with patient demonstrating appropriate use.  Antiembolism stockings, SCDs 

present.


GASTROINTESTINAL:  Abdomen soft, nontender, nondistended.  Hypoactive bowel 

sounds present 4 quadrants.  Currently NPO.  Positive flatus.


GENITOURINARY:  Ramirez present draining clear, yellow urine.  Output overnight 

600 mL 


INTEGUMENTARY:  Skin is warm and dry with evidence of good perfusion.  Anterior 

chest incision well approximated and covered with dry intact dressing.  EVH site

well approximated without redness or drainage.  Left radial artery harvest site 

well approximated, patient denies numbness or tingling, able to wiggle all 

fingers and  appropriately, good cap refill.


NEUROLOGIC:  Cranial nerves II through XII intact


MUSKULOSKELETAL:  Able to move all extremities, strength equal bilaterally


PSYCHIATRIC:  Alert and oriented to person place and time, appropriate affect, 

intact judgment and insight


INVASIVE LINES AND TUBES:  Left pleural chest tube present and connected to wall

suction, no air leak present.  Left pleural chest tube with 200 mL 

serosanguineous drainage overnight, 300 mL in the last 24 hours.  A/V epicardial

pacemaker wires present, grounded.  





- Allied health notes


Allied health notes reviewed: nursing





- Labs


CBC & Chem 7: 


                                 03/29/21 04:26





                                 03/29/21 04:26


Labs: 


                  Abnormal Lab Results - Last 24 Hours (Table)











  03/28/21 03/28/21 03/28/21 Range/Units





  11:53 17:24 20:01 


 


RBC     (4.30-5.90)  m/uL


 


Hgb     (13.0-17.5)  gm/dL


 


Hct     (39.0-53.0)  %


 


Lymphocytes #     (1.0-4.8)  k/uL


 


Sodium     (137-145)  mmol/L


 


Carbon Dioxide     (22-30)  mmol/L


 


BUN     (9-20)  mg/dL


 


Creatinine     (0.66-1.25)  mg/dL


 


Glucose     (74-99)  mg/dL


 


POC Glucose (mg/dL)  244 H  302 H  287 H  (75-99)  mg/dL


 


Calcium     (8.4-10.2)  mg/dL


 


Troponin I     (0.000-0.034)  ng/mL


 


Total Protein     (6.3-8.2)  g/dL


 


Albumin     (3.5-5.0)  g/dL














  03/29/21 03/29/21 03/29/21 Range/Units





  03:31 04:26 04:26 


 


RBC   2.16 L   (4.30-5.90)  m/uL


 


Hgb   7.0 L   (13.0-17.5)  gm/dL


 


Hct   20.5 L   (39.0-53.0)  %


 


Lymphocytes #   0.5 L   (1.0-4.8)  k/uL


 


Sodium    131 L  (137-145)  mmol/L


 


Carbon Dioxide    18 L  (22-30)  mmol/L


 


BUN    36 H  (9-20)  mg/dL


 


Creatinine    1.55 H  (0.66-1.25)  mg/dL


 


Glucose    214 H  (74-99)  mg/dL


 


POC Glucose (mg/dL)  218 H    (75-99)  mg/dL


 


Calcium    7.4 L  (8.4-10.2)  mg/dL


 


Troponin I     (0.000-0.034)  ng/mL


 


Total Protein    5.1 L  (6.3-8.2)  g/dL


 


Albumin    2.9 L  (3.5-5.0)  g/dL














  03/29/21 03/29/21 Range/Units





  04:26 06:06 


 


RBC    (4.30-5.90)  m/uL


 


Hgb    (13.0-17.5)  gm/dL


 


Hct    (39.0-53.0)  %


 


Lymphocytes #    (1.0-4.8)  k/uL


 


Sodium    (137-145)  mmol/L


 


Carbon Dioxide    (22-30)  mmol/L


 


BUN    (9-20)  mg/dL


 


Creatinine    (0.66-1.25)  mg/dL


 


Glucose    (74-99)  mg/dL


 


POC Glucose (mg/dL)   218 H  (75-99)  mg/dL


 


Calcium    (8.4-10.2)  mg/dL


 


Troponin I  4.180 H*   (0.000-0.034)  ng/mL


 


Total Protein    (6.3-8.2)  g/dL


 


Albumin    (3.5-5.0)  g/dL














- Imaging and Cardiology


Chest x-ray: report reviewed, image reviewed





Assessment and Plan


Assessment: 





1.  Triple-vessel diffuse coronary artery disease, non-STEMI this admission, 

status post four-vessel CABG


2.  Moderate to severe predominantly centrally directed mitral regurgitation, 

status post mitral valve repair


3.  Insulin-dependent diabetes, hemoglobin A1c 8.2%


4.  Hyperlipidemia, cholesterol 241, triglyceride 200, 


5.  Left internal carotid artery stenosis, greater than 70% per carotid Doppler,

greater than 90% per neck CTA


6.  Lifelong nonsmoker with FEV1 58% of predicted 


7.  Family history of premature coronary artery disease, father and 2 brothers 

diagnosed with CAD with subsequent CABG in their early to mid 50s


8.  EtOH use most days without history of withdrawal


9.  Preop nasal swab positive for MSSA


10.  Postoperative acute blood loss anemia, expected


Plan: 





1.  Continue aspirin, statin, beta blocker therapy.  Will increase beta blocker 

therapy as tolerated.  Change Plavix to brilinta


2.  Will stop norvasc


3.  Wean O2 as tolerated.  Encourage incentive spirometry use 10 times every 

hour while awake


4.  Increase activity, ambulate as tolerated.  PT/OT/cardiac rehab consulted


5.  Will monitor daily labs and x-rays.  Electrolyte replacement per protocol.  

Will transfuse 1 unit prbc followed by lasix


6.  GI/DVT prophylaxis


7.  Pain control with current medication regimen


8.  Insulin management per primary care service.  Needs tight blood sugar 

control for healing


9.  [facility] protocol to monitor for alcohol withdrawal.  Continue thiamine, folic 

acid


10.  Left carotid stenosis intervention to be completed in the future.  Avoid 

hypotension


11.  Likely will discontinue left pleural chest tubes 


12.  Continue neuro checks


13.  Strict accurate intake and output.  Daily weights.


14.  More recommendations to follow depending on patient's progress


Time with Patient: Greater than 30

## 2021-03-29 NOTE — ECHOF
Referral Reason:LV function



MEASUREMENTS

--------

HEIGHT: 167.6 cm

WEIGHT: 86.2 kg

BP: 

RAP:   5.00 mmHg

RVSP:   33.95 mmHg







FINDINGS

--------

Sinus rhythm.

This was a technically adequate study.   Limited Study

Overall left ventricular systolic function is mildly impaired with, an EF between 45 - 50 %.   Basal 
inferior LV wall motion is hypokinetic.    Basal inferoseptal LV wall motion is hypokinetic.

Mild tricuspid regurgitation present.   There is borderline pulmonary hypertension.   The right ventr
icular systolic pressure, as measured by Doppler, is 33.95mmHg.

There is no pericardial effusion.



CONCLUSIONS

--------

1. This was a technically adequate study.

2. Limited Study

3. Overall left ventricular systolic function is mildly impaired with, an EF between 45 - 50 %.

4. Basal inferior LV wall motion is hypokinetic.

5. Basal inferoseptal LV wall motion is hypokinetic.

6. Mild tricuspid regurgitation present.

7. There is borderline pulmonary hypertension.

8. The right ventricular systolic pressure, as measured by Doppler, is 33.95mmHg.

9. There is no pericardial effusion.





SONOGRAPHER: Emily Sorto [facility]

## 2021-03-29 NOTE — P.PN
Subjective


Progress Note Date: 03/29/21


Principal diagnosis: 





Myocardial infarction





Overnight events noted, patient had a right-sided facial droop as well as right 

arm weakness.  No focal numbness.  No blurred vision. 





Objective





- Vital Signs


Vital signs: 


                                   Vital Signs











Temp  99 F   03/29/21 15:25


 


Pulse  97   03/29/21 15:25


 


Resp  16   03/29/21 15:25


 


BP  132/75   03/29/21 15:25


 


Pulse Ox  99   03/29/21 15:25








                                 Intake & Output











 03/28/21 03/29/21 03/29/21





 18:59 06:59 18:59


 


Intake Total 476  0


 


Output Total 370 800 475


 


Balance 106 -800 -475


 


Weight   88.6 kg


 


Intake:   


 


    


 


    .9NS Pressure Bag 36  


 


    Sodium Chloride 0.9% 1, 200  





    000 ml @ 20 mls/hr IV .   





    Q24H [facility] Rx#:[id number]   


 


  Oral 240  


 


  Blood Product   0


 


    Rc Pheresis 2 As3  Unit   0





    [id number]   


 


Output:   


 


  Chest Tube Drainage 100 200 


 


    Left Pleural Chest tube 100 200 


 


    Mediastinal Chest Tubes X 0  





    2   


 


  Urine 270 600 475


 


Other:   


 


  Voiding Method Indwelling Catheter Indwelling Catheter Indwelling Catheter








                       ABP, PAP, CO, CI - Last Documented











Arterial Blood Pressure        81/54


 


Pulmonary Artery Pressure      30/15


 


Cardiac Output                 5.3


 


Cardiac Index                  2.6

















- Exam





Gen: No acute distress, extubated


HEENT: normocephalic, atraumatic, moist mucous membranes


Resp: Clear bilaterally


CVS: good distal perfusion x 4, regular rate and rhythm without murmurs


GI: soft, NTTP, ND, appropriate bowel sounds


: no SPT, no CVAT, gonzalez catheter is present


MSK: no pitting edema, no clubbing


Neuro: non-focal, moving all extremities





- Labs


CBC & Chem 7: 


                                 03/29/21 04:26





                                 03/29/21 04:26


Labs: 


                  Abnormal Lab Results - Last 24 Hours (Table)











  03/28/21 03/28/21 03/29/21 Range/Units





  17:24 20:01 03:31 


 


RBC     (4.30-5.90)  m/uL


 


Hgb     (13.0-17.5)  gm/dL


 


Hct     (39.0-53.0)  %


 


Lymphocytes #     (1.0-4.8)  k/uL


 


Sodium     (137-145)  mmol/L


 


Carbon Dioxide     (22-30)  mmol/L


 


BUN     (9-20)  mg/dL


 


Creatinine     (0.66-1.25)  mg/dL


 


Glucose     (74-99)  mg/dL


 


POC Glucose (mg/dL)  302 H  287 H  218 H  (75-99)  mg/dL


 


Calcium     (8.4-10.2)  mg/dL


 


Troponin I     (0.000-0.034)  ng/mL


 


Total Protein     (6.3-8.2)  g/dL


 


Albumin     (3.5-5.0)  g/dL


 


Crossmatch     














  03/29/21 03/29/21 03/29/21 Range/Units





  04:26 04:26 04:26 


 


RBC   2.16 L   (4.30-5.90)  m/uL


 


Hgb   7.0 L   (13.0-17.5)  gm/dL


 


Hct   20.5 L   (39.0-53.0)  %


 


Lymphocytes #   0.5 L   (1.0-4.8)  k/uL


 


Sodium    131 L  (137-145)  mmol/L


 


Carbon Dioxide    18 L  (22-30)  mmol/L


 


BUN    36 H  (9-20)  mg/dL


 


Creatinine    1.55 H  (0.66-1.25)  mg/dL


 


Glucose    214 H  (74-99)  mg/dL


 


POC Glucose (mg/dL)     (75-99)  mg/dL


 


Calcium    7.4 L  (8.4-10.2)  mg/dL


 


Troponin I     (0.000-0.034)  ng/mL


 


Total Protein    5.1 L  (6.3-8.2)  g/dL


 


Albumin    2.9 L  (3.5-5.0)  g/dL


 


Crossmatch  See Detail    














  03/29/21 03/29/21 03/29/21 Range/Units





  04:26 06:06 08:19 


 


RBC     (4.30-5.90)  m/uL


 


Hgb     (13.0-17.5)  gm/dL


 


Hct     (39.0-53.0)  %


 


Lymphocytes #     (1.0-4.8)  k/uL


 


Sodium     (137-145)  mmol/L


 


Carbon Dioxide     (22-30)  mmol/L


 


BUN     (9-20)  mg/dL


 


Creatinine     (0.66-1.25)  mg/dL


 


Glucose     (74-99)  mg/dL


 


POC Glucose (mg/dL)   218 H  279 H  (75-99)  mg/dL


 


Calcium     (8.4-10.2)  mg/dL


 


Troponin I  4.180 H*    (0.000-0.034)  ng/mL


 


Total Protein     (6.3-8.2)  g/dL


 


Albumin     (3.5-5.0)  g/dL


 


Crossmatch     














  03/29/21 Range/Units





  11:40 


 


RBC   (4.30-5.90)  m/uL


 


Hgb   (13.0-17.5)  gm/dL


 


Hct   (39.0-53.0)  %


 


Lymphocytes #   (1.0-4.8)  k/uL


 


Sodium   (137-145)  mmol/L


 


Carbon Dioxide   (22-30)  mmol/L


 


BUN   (9-20)  mg/dL


 


Creatinine   (0.66-1.25)  mg/dL


 


Glucose   (74-99)  mg/dL


 


POC Glucose (mg/dL)  282 H  (75-99)  mg/dL


 


Calcium   (8.4-10.2)  mg/dL


 


Troponin I   (0.000-0.034)  ng/mL


 


Total Protein   (6.3-8.2)  g/dL


 


Albumin   (3.5-5.0)  g/dL


 


Crossmatch   














Assessment and Plan


Plan: 





NSTEMI S/P CABGX4 3/26


-Cardio and CT surgery following


-Telemetry monitoring.


-Continue daily aspirin, atorvastatin, and metoprolol.


-Lipid profile revealed elevation of triglycerides 200, cholesterol 241, LDL 

164, HDL of 37.  Atorvastatin increased to 80 mg nightly.





TIA


-Neurology consulted


-Continue aspirin, switch plavix to brilinta





Insulin-dependent diabetes mellitus type II


-D/c insulin drip


-Continue SSI and lantus 20 units daily 


-Hemoglobin A1c = 8.2%





CODE STATUS: Full code


DVT prophylaxis: Heparin


Discussed with: Patient


Anticipated discharge date: 3-4 days


Anticipated discharge place: Home

## 2021-03-29 NOTE — P.PN
Subjective





HISTORY OF PRESENTING ILLNESS


This is a pleasant 51-year-old  male past medical history significant 

for diabetes mellitus and daily alcohol intake. He follows in the office with 

Dr. Chavez in [place]. We have been asked to see in consultation for NSTEMI.  

He states for the previous 2 weeks he has been experiencing chest heaviness 

intermittently.  The symptoms occur typically at rest mostly at night when he 

lays down and tries to sleep.  When he lays down flat he feels a heaviness in 

his chest with some mild shortness of breath.  He denies any symptoms of chest 

discomfort or shortness of breath with exertion.  He has had some intermittent 

diarrhea.  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 

very physically active.  He had no symptoms of chest discomfort throughout the 

day.  The next morning he woke up and went out to breakfast and then had an 

episode of nausea and vomiting.  He was concerned with possible ovarian.  He 

came to the emergency department yesterday afternoon to be tested.  We have seen

and examined resting comfortably in bed in no acute distress.  He has no 

symptoms of chest discomfort or shortness of breath.





3/29/2021


Pt is seen and examined resting comfortably in no acute distress. He is POD#3 

bypass grafting and mitral valve repair with LMIA-LAD, RSVG-D1, LIMA-Cx and 

RSVG-RCA. He denies chest pain or shortness of breath. He had an episode of 

right sided facial droop, right arm weakness and confusion around 0300. Code 

stroke called. Symptoms have resolved. CT brain negative for an acute process. 

CTA head/neck reveals unchanged severe left proximal stenosis. Chest xray this 

morning reveals no appreciable pneumothorax with some mild vascular congestion. 

Blood pressure 133/69 heart rate 88 afebrile and maintaining oxygen saturation 

on nasal cannula. Maintaining sinus mechanism on telemetry. 





PHYSICAL EXAMINATION


CONSTITUTIONAL: No apparent distress. 


HEENT: Head is normocephalic. Pupils are equal, round. Sclerae anicteric. Mucous

membranes of the mouth are moist.  No JVD. Left carotid bruit, no bruit on the 

right.


CHEST EXAMINATION: Lungs are clear to auscultation. No chest wall tenderness is 

noted on palpation or with deep breathing. Left chest tube in place. Epicardial 

wires present. 


HEART EXAMINATION: Regular rate and rhythm. S1, S2 heard. Systolic ejection 

murmur at the base, no gallops or rub.


EXTREMITIES: 2+ peripheral pulses, no lower extremity edema and no calf 

tenderness. 





ASSESSMENT


NSTEMI


Coronary artery disease s/p 4V bypass grafting POD#3


TIA


Carotid stenosis


Acute blood loss anemia


Diabetes mellitus


Daily alcohol intake


Dyslipidemia


Valvular heart disease s/p mitral valve repair





PLAN


The patient has been seen in evaluation by neurology and they are recommending 

an MRI and close clinical observation for another 24 hours. He does recommend 

carotid intervention on this admission pre interventional cardiology, ideally 

within 7 days. 


We will discuss


CT surgery is changing his anti-platelet to brilinta and will load him 

appropriately. 


Repeat EKG today and limited echocardiogram. 


Further recommendations to follow based on clinical course. 





Nurse Practitioner note has been reviewed, I agree with a documented findings 

and plan of care.  Patient was seen and examined.





Objective





- Vital Signs


Vital signs: 


                                   Vital Signs











Temp  99 F   03/29/21 08:10


 


Pulse  82   03/29/21 08:21


 


Resp  20   03/29/21 08:10


 


BP  133/69   03/29/21 08:10


 


Pulse Ox  97   03/29/21 08:10








                                 Intake & Output











 03/28/21 03/29/21 03/29/21





 18:59 06:59 18:59


 


Intake Total 476  


 


Output Total 370 800 475


 


Balance 106 -800 -475


 


Intake:   


 


    


 


    .9NS Pressure Bag 36  


 


    Sodium Chloride 0.9% 1, 200  





    000 ml @ 20 mls/hr IV .   





    [facility][id number]   


 


  Oral 240  


 


Output:   


 


  Chest Tube Drainage 100 200 


 


    Left Pleural Chest tube 100 200 


 


    Mediastinal Chest Tubes X 0  





    2   


 


  Urine 270 600 475


 


Other:   


 


  Voiding Method Indwelling Catheter Indwelling Catheter 








                       ABP, PAP, CO, CI - Last Documented











Arterial Blood Pressure        81/54


 


Pulmonary Artery Pressure      30/15


 


Cardiac Output                 5.3


 


Cardiac Index                  2.6

















- Labs


CBC & Chem 7: 


                                 03/29/21 04:26





                                 03/29/21 04:26


Labs: 


                  Abnormal Lab Results - Last 24 Hours (Table)











  03/28/21 03/28/21 03/28/21 Range/Units





  11:53 17:24 20:01 


 


RBC     (4.30-5.90)  m/uL


 


Hgb     (13.0-17.5)  gm/dL


 


Hct     (39.0-53.0)  %


 


Lymphocytes #     (1.0-4.8)  k/uL


 


Sodium     (137-145)  mmol/L


 


Carbon Dioxide     (22-30)  mmol/L


 


BUN     (9-20)  mg/dL


 


Creatinine     (0.66-1.25)  mg/dL


 


Glucose     (74-99)  mg/dL


 


POC Glucose (mg/dL)  244 H  302 H  287 H  (75-99)  mg/dL


 


Calcium     (8.4-10.2)  mg/dL


 


Troponin I     (0.000-0.034)  ng/mL


 


Total Protein     (6.3-8.2)  g/dL


 


Albumin     (3.5-5.0)  g/dL


 


Crossmatch     














  03/29/21 03/29/21 03/29/21 Range/Units





  03:31 04:26 04:26 


 


RBC    2.16 L  (4.30-5.90)  m/uL


 


Hgb    7.0 L  (13.0-17.5)  gm/dL


 


Hct    20.5 L  (39.0-53.0)  %


 


Lymphocytes #    0.5 L  (1.0-4.8)  k/uL


 


Sodium     (137-145)  mmol/L


 


Carbon Dioxide     (22-30)  mmol/L


 


BUN     (9-20)  mg/dL


 


Creatinine     (0.66-1.25)  mg/dL


 


Glucose     (74-99)  mg/dL


 


POC Glucose (mg/dL)  218 H    (75-99)  mg/dL


 


Calcium     (8.4-10.2)  mg/dL


 


Troponin I     (0.000-0.034)  ng/mL


 


Total Protein     (6.3-8.2)  g/dL


 


Albumin     (3.5-5.0)  g/dL


 


Crossmatch   See Detail   














  03/29/21 03/29/21 03/29/21 Range/Units





  04:26 04:26 06:06 


 


RBC     (4.30-5.90)  m/uL


 


Hgb     (13.0-17.5)  gm/dL


 


Hct     (39.0-53.0)  %


 


Lymphocytes #     (1.0-4.8)  k/uL


 


Sodium  131 L    (137-145)  mmol/L


 


Carbon Dioxide  18 L    (22-30)  mmol/L


 


BUN  36 H    (9-20)  mg/dL


 


Creatinine  1.55 H    (0.66-1.25)  mg/dL


 


Glucose  214 H    (74-99)  mg/dL


 


POC Glucose (mg/dL)    218 H  (75-99)  mg/dL


 


Calcium  7.4 L    (8.4-10.2)  mg/dL


 


Troponin I   4.180 H*   (0.000-0.034)  ng/mL


 


Total Protein  5.1 L    (6.3-8.2)  g/dL


 


Albumin  2.9 L    (3.5-5.0)  g/dL


 


Crossmatch     














  03/29/21 Range/Units





  08:19 


 


RBC   (4.30-5.90)  m/uL


 


Hgb   (13.0-17.5)  gm/dL


 


Hct   (39.0-53.0)  %


 


Lymphocytes #   (1.0-4.8)  k/uL


 


Sodium   (137-145)  mmol/L


 


Carbon Dioxide   (22-30)  mmol/L


 


BUN   (9-20)  mg/dL


 


Creatinine   (0.66-1.25)  mg/dL


 


Glucose   (74-99)  mg/dL


 


POC Glucose (mg/dL)  279 H  (75-99)  mg/dL


 


Calcium   (8.4-10.2)  mg/dL


 


Troponin I   (0.000-0.034)  ng/mL


 


Total Protein   (6.3-8.2)  g/dL


 


Albumin   (3.5-5.0)  g/dL


 


Crossmatch

## 2021-03-29 NOTE — P.CNNES
History of Present Illness


Consult date: 03/29/21


Requesting physician: Cliff Manuel


Reason for Consult: right facial droop, arm weakness and slurring speech


History of Present Illness: 


This is a 51-year-old gentleman with medical history of  insulin-dependent 

diabetes, multiple vessel CAD, alcohol use, newly diagnosed hyperlipidemia who 

presented to the emgeregeny department on 03/22/2021 for chest heaviness, 

general malaise, fatigue.  neurology is consulted for stroke. During hopsital 

stay patient underwent CABG on 03/26/2021.


On 03/29/2021 at around 3:33 AM stroke code was activated since the patient's 

nurse noted that the patient had the slight right facial droop, right arm 

weakness and slurring the speech.  NIH stroke scale was a 7.


The patient underwent a CT of the head  it is reported as no acute hemorrhage, 

hydrocephalus or mass effect.


CT angiography of the head and neck was reported as unchanged severe left 

proximal internal carotid artery stenosis.  Postsurgical changes with a soft 

tissue emphysema along the anterior chest wall, worsened the right side the 

pleural effusion, and trace left apical pneumothorax.  Patient NIH stroke scale 

improved down to a 5.  Was noted that the patient had neglect.  Was felt the 

patient had right pupil 3 mm and left pupil 2.5 mm but that's normal.  The 

patient's weakness resolved except for the right facial droop as well as 

decreased sensation to touch.  Patient blood glucose was 218 at bedside.


Stroke attending stated the patient is not TPA candidate.  And to continue 

aspirin and Plavix.  He has currently on aspirin 325, Plavix 75 mg a daily, and 

Lipitor 80 mg daily.


Upon seeing the patient today, he feels better compared to yesterday but has 

generalized weakness.  She stated that yesterday he had an episode that he could

not get the words out even though he knew what he wanted to say.  Per the nurse 

she feels the patient is back to baseline.


Patient denies tobacco use.  But drink 3-4 cans of beers/daily.


Patient is not on any antiplatelets at home or anticoagulation.  Patient stated 

he is not on statin at home.





Patient underwent left heart catheterization as well as bilateral coronary angio

graphy on 03/23/2021 which showed multiple vessel coronary artery disease as 

well as mildly elevated left-sided that filling pressure.  Patient had a 

transesophageal echocardiogram on 03/24/2021 and a showed moderate to severe 

central mitral regurgitation.  Cardiomyopathy with ejection fraction of 40-45%. 

Left atrial appendage was free of clot.  No evidence of PFO.  The intra-atrial 

septum is intact.


During the hospital stay the patient was on aspirin as well as Plavix.


Seems that the patient had the coronary artery bypass grafting on 03/26/2021.  

It is noted that the patient had carotid stenosis on the left ICA was 90% and 

the right was 50-60%.


Also during the hospital stay it shows that the patient had left internal 

carotid artery stenosis greater than 70% per carotid Doppler (03/23/21), greater

than 90% per CTA and right ICA is 50-60%.


As documented that the plan was for left carotid endarterectomy at a later date.


Lipid panel:, cholesterol 241, LDL is 164, HDL is 37.


TSH is 4.47 which is normal.


Hemoglobin A1c is 8.2.  The patient blood sugar during the hospital stay has b

een in the range and the last 24 hours between 210 and 302.


Of note patient has family history of premature coronary artery disease.





Review of Systems


Review of system:  The 12 point system was reviewed and apparent positive and 

negative per HPI.








Past Medical History


Past Medical History: Diabetes Mellitus


History of Any Multi-Drug Resistant Organisms: None Reported


Past Surgical History: Tonsillectomy


Past Anesthesia/Blood Transfusion Reactions: No Reported Reaction


Past Psychological History: No Psychological Hx Reported


Smoking Status: Never smoker


Past Alcohol Use History: Daily


Additional Past Alcohol Use History / Comment(s): Drinks 3-4 drinks most days; 

has never experienced withdrawal


Past Drug Use History: None Reported





- Past Family History


  ** Father


Family Medical History: Coronary Artery Disease (CAD), Myocardial Infarction 

(MI)


Additional Family Medical History / Comment(s): CABG





  ** Brother(s)


Family Medical History: Coronary Artery Disease (CAD), Myocardial Infarction 

(MI)


Additional Family Medical History / Comment(s): CABG





  ** Mother


Family Medical History: Coronary Artery Disease (CAD)





Medications and Allergies


                                Home Medications











 Medication  Instructions  Recorded  Confirmed  Type


 


Insulin Aspart [NovoLOG Flexpen] See Protocol SQ AC-TID 03/22/21 03/22/21 

History


 


Insulin Glargine,Hum.rec.anlog 20 unit SQ DAILY 03/22/21 03/22/21 History





[Lantus Solostar]    








                                    Allergies











Allergy/AdvReac Type Severity Reaction Status Date / Time


 


No Known Allergies Allergy   Verified 03/22/21 13:06














Physical Examination





- Vital Signs


Vital Signs: 


                                   Vital Signs











  Temp Pulse Pulse Pulse Resp BP BP


 


 03/29/21 08:21   82     


 


 03/29/21 08:13   86     


 


 03/29/21 08:10  99 F    88  20  


 


 03/29/21 04:00       


 


 03/29/21 03:39      18  


 


 03/29/21 03:28     93  18  


 


 03/29/21 00:00  98.7 F   86   18   100/60


 


 03/28/21 21:20        108/60


 


 03/28/21 20:34   78     


 


 03/28/21 20:23   80     


 


 03/28/21 20:00  98.4 F   84   18   97/61


 


 03/28/21 16:32  98.1 F   89   18   110/58


 


 03/28/21 16:00   90    19  96/63 


 


 03/28/21 15:52      22  


 


 03/28/21 15:40   86    22  


 


 03/28/21 15:29   81    19  


 


 03/28/21 15:00   81    23  100/65 


 


 03/28/21 14:00   84    16  98/59 


 


 03/28/21 13:00   86    17  110/64 


 


 03/28/21 12:00  98 F  90    16  102/83 


 


 03/28/21 11:08   84    15  


 


 03/28/21 11:00   82    21  106/71 


 


 03/28/21 10:58   82    23  


 


 03/28/21 10:00   86    22  91/60 














  BP Pulse Ox


 


 03/29/21 08:21  


 


 03/29/21 08:13  


 


 03/29/21 08:10  133/69  97


 


 03/29/21 04:00  150/65  98


 


 03/29/21 03:39  126/71  97


 


 03/29/21 03:28  105/60  88 L


 


 03/29/21 00:00   95


 


 03/28/21 21:20  


 


 03/28/21 20:34  


 


 03/28/21 20:23  


 


 03/28/21 20:00   96


 


 03/28/21 16:32   94 L


 


 03/28/21 16:00   99


 


 03/28/21 15:52  


 


 03/28/21 15:40  


 


 03/28/21 15:29  


 


 03/28/21 15:00   97


 


 03/28/21 14:00   97


 


 03/28/21 13:00   90 L


 


 03/28/21 12:00   95


 


 03/28/21 11:08  


 


 03/28/21 11:00   99


 


 03/28/21 10:58  


 


 03/28/21 10:00   100








                                Intake and Output











 03/28/21 03/29/21 03/29/21





 22:59 06:59 14:59


 


Intake Total 43  


 


Output Total 670 200 


 


Balance -627 -200 


 


Intake:   


 


  IV 43  


 


    .9NS Pressure Bag 3  


 


    Sodium Chloride 0.9% 1, 40  





    000 ml @ 20 mls/hr IV .   





    Q24H [facility] Rx#:[id number]   


 


Output:   


 


  Chest Tube Drainage  200 


 


    Left Pleural Chest tube  200 


 


  Urine 670  


 


Other:   


 


  Voiding Method Indwelling Catheter Indwelling Catheter 











GENERAL: The patient is lying in bed and is not in acute distress.


CHEST: The heart rate is regular rate rhythm.   No murmurs to auscultation.  

Left carotid bruit over the left.


LUNG: Clear to auscultation bilaterally no wheezing noted throughout.  Not 

labored breathing.


ABDOMEN/GI: Bowel sounds present in all 4 quadrants. No tenderness to palpation 

throughout.





NEUROLOGICAL:


Higher mental function: The patient is awake, alert, oriented to self, place and

time.  Patient is following commands.  No aphasia and no neglect.


Cranial nerves: The pupils are round, equal and reactive to light and 

accommodation.  Visual fields are full to confrontation throughout.  Extraocular

movement is intact no nystagmus is noted.  Facial sensation is normal to touch 

throughout.  The facial strength is right nasolabial flattening.   Hearing is 

normal bilaterally to hand rub.  Tongue is midline and moved side-to-side 

without any difficulty.  No dysarthria is noted.  Shoulder shrug is normal 

bilaterally.


Motor: Gait is deferred.  The strength is rigth elbow flexion is 5-.  Otherwise 

5/5 in upper.  Lower is bilateral above gravity and had 4+ bilaterally (limited 

because of pain).  Normal tone and bulk.  


Cerebellum: Normal finger to nose  bilaterally.


Sensation: Sensation is normal to touch throughout.


Reflexes (right/left): 1+ throughout.


Plantars are mute bilaterally. 








Results


Last coagulation study: PT of 10.4, INR is 1.0, PTT of 25.0.


Corona virus PCR is nondetected.











- Laboratory Findings


CBC and BMP: 


                                 03/29/21 04:26





                                 03/29/21 04:26


Abnormal Lab Findings: 


                                  Abnormal Labs











  03/22/21 03/22/21 03/22/21





  11:29 11:29 14:09


 


WBC   


 


RBC   


 


Hgb   


 


Hct   


 


Neutrophils #   


 


Lymphocytes #   


 


PT   


 


INR   


 


APTT   


 


ABG pH   


 


ABG pCO2   


 


ABG pO2   


 


ABG Total CO2   


 


ABG O2 Saturation   


 


ABG Hematocrit   


 


ABG Sodium   


 


ABG Potassium   


 


ABG Ionized Calcium   


 


ABG Glucose   


 


Hemoglobin   


 


Sodium   


 


Potassium   


 


Chloride   


 


Carbon Dioxide   


 


BUN   


 


Creatinine   


 


Glucose  317 H  


 


POC Glucose (mg/dL)   


 


Hemoglobin A1c   


 


Calcium   


 


Magnesium   


 


AST   


 


Alkaline Phosphatase   


 


Troponin I   3.120 H*  6.080 H*


 


Total Protein   


 


Albumin   


 


Triglycerides   


 


Cholesterol   


 


LDL Cholesterol, Calc   


 


HDL Cholesterol   


 


Arterial Blood Potassium   


 


Arterial Blood Glucose   


 


Urine Protein   


 


Urine Glucose (UA)   


 


Urine Ketones   


 


Crossmatch   














  03/22/21 03/22/21 03/22/21





  18:38 18:38 22:26


 


WBC   


 


RBC   


 


Hgb   


 


Hct   


 


Neutrophils #   


 


Lymphocytes #   


 


PT   


 


INR   


 


APTT  30.1 H  


 


ABG pH   


 


ABG pCO2   


 


ABG pO2   


 


ABG Total CO2   


 


ABG O2 Saturation   


 


ABG Hematocrit   


 


ABG Sodium   


 


ABG Potassium   


 


ABG Ionized Calcium   


 


ABG Glucose   


 


Hemoglobin   


 


Sodium   


 


Potassium   


 


Chloride   


 


Carbon Dioxide   


 


BUN   


 


Creatinine   


 


Glucose   


 


POC Glucose (mg/dL)    302 H


 


Hemoglobin A1c   


 


Calcium   


 


Magnesium   


 


AST   


 


Alkaline Phosphatase   


 


Troponin I   7.840 H* 


 


Total Protein   


 


Albumin   


 


Triglycerides   


 


Cholesterol   


 


LDL Cholesterol, Calc   


 


HDL Cholesterol   


 


Arterial Blood Potassium   


 


Arterial Blood Glucose   


 


Urine Protein   


 


Urine Glucose (UA)   


 


Urine Ketones   


 


Crossmatch   














  03/23/21 03/23/21 03/23/21





  02:37 02:37 02:37


 


WBC    10.7 H


 


RBC    3.93 L


 


Hgb    12.7 L


 


Hct    36.2 L


 


Neutrophils #    8.9 H


 


Lymphocytes #   


 


PT   


 


INR   


 


APTT   


 


ABG pH   


 


ABG pCO2   


 


ABG pO2   


 


ABG Total CO2   


 


ABG O2 Saturation   


 


ABG Hematocrit   


 


ABG Sodium   


 


ABG Potassium   


 


ABG Ionized Calcium   


 


ABG Glucose   


 


Hemoglobin   


 


Sodium   134 L 


 


Potassium   


 


Chloride   


 


Carbon Dioxide   


 


BUN   21 H 


 


Creatinine   


 


Glucose   278 H 


 


POC Glucose (mg/dL)   


 


Hemoglobin A1c  8.2 H  


 


Calcium   8.2 L 


 


Magnesium   


 


AST   


 


Alkaline Phosphatase   


 


Troponin I   


 


Total Protein   


 


Albumin   


 


Triglycerides   200 H 


 


Cholesterol   241 H 


 


LDL Cholesterol, Calc   164 H 


 


HDL Cholesterol   37 L 


 


Arterial Blood Potassium   


 


Arterial Blood Glucose   


 


Urine Protein   


 


Urine Glucose (UA)   


 


Urine Ketones   


 


Crossmatch   














  03/23/21 03/23/21 03/23/21





  02:37 06:35 08:24


 


WBC   


 


RBC   


 


Hgb   


 


Hct   


 


Neutrophils #   


 


Lymphocytes #   


 


PT   


 


INR   


 


APTT  41.2 H   41.4 H


 


ABG pH   


 


ABG pCO2   


 


ABG pO2   


 


ABG Total CO2   


 


ABG O2 Saturation   


 


ABG Hematocrit   


 


ABG Sodium   


 


ABG Potassium   


 


ABG Ionized Calcium   


 


ABG Glucose   


 


Hemoglobin   


 


Sodium   


 


Potassium   


 


Chloride   


 


Carbon Dioxide   


 


BUN   


 


Creatinine   


 


Glucose   


 


POC Glucose (mg/dL)   282 H 


 


Hemoglobin A1c   


 


Calcium   


 


Magnesium   


 


AST   


 


Alkaline Phosphatase   


 


Troponin I   


 


Total Protein   


 


Albumin   


 


Triglycerides   


 


Cholesterol   


 


LDL Cholesterol, Calc   


 


HDL Cholesterol   


 


Arterial Blood Potassium   


 


Arterial Blood Glucose   


 


Urine Protein   


 


Urine Glucose (UA)   


 


Urine Ketones   


 


Crossmatch   














  03/23/21 03/23/21 03/23/21





  08:24 12:22 16:59


 


WBC   


 


RBC   


 


Hgb   


 


Hct   


 


Neutrophils #   


 


Lymphocytes #   


 


PT   


 


INR   


 


APTT   


 


ABG pH   


 


ABG pCO2   


 


ABG pO2   


 


ABG Total CO2   


 


ABG O2 Saturation   


 


ABG Hematocrit   


 


ABG Sodium   


 


ABG Potassium   


 


ABG Ionized Calcium   


 


ABG Glucose   


 


Hemoglobin   


 


Sodium   


 


Potassium   


 


Chloride   


 


Carbon Dioxide   


 


BUN   


 


Creatinine   


 


Glucose   


 


POC Glucose (mg/dL)   204 H  292 H


 


Hemoglobin A1c   


 


Calcium   


 


Magnesium   


 


AST   


 


Alkaline Phosphatase   


 


Troponin I  7.700 H*  


 


Total Protein   


 


Albumin   


 


Triglycerides   


 


Cholesterol   


 


LDL Cholesterol, Calc   


 


HDL Cholesterol   


 


Arterial Blood Potassium   


 


Arterial Blood Glucose   


 


Urine Protein   


 


Urine Glucose (UA)   


 


Urine Ketones   


 


Crossmatch   














  03/23/21 03/23/21 03/24/21





  20:12 21:52 05:50


 


WBC   


 


RBC   


 


Hgb   


 


Hct   


 


Neutrophils #   


 


Lymphocytes #   


 


PT   


 


INR   


 


APTT   31.1 H 


 


ABG pH   


 


ABG pCO2   


 


ABG pO2   


 


ABG Total CO2   


 


ABG O2 Saturation   


 


ABG Hematocrit   


 


ABG Sodium   


 


ABG Potassium   


 


ABG Ionized Calcium   


 


ABG Glucose   


 


Hemoglobin   


 


Sodium   


 


Potassium   


 


Chloride   


 


Carbon Dioxide   


 


BUN   


 


Creatinine   


 


Glucose   


 


POC Glucose (mg/dL)  153 H  


 


Hemoglobin A1c   


 


Calcium   


 


Magnesium   


 


AST   


 


Alkaline Phosphatase   


 


Troponin I   


 


Total Protein   


 


Albumin   


 


Triglycerides   


 


Cholesterol   


 


LDL Cholesterol, Calc   


 


HDL Cholesterol   


 


Arterial Blood Potassium   


 


Arterial Blood Glucose   


 


Urine Protein    Trace H


 


Urine Glucose (UA)    4+ H


 


Urine Ketones    1+ H


 


Crossmatch   














  03/24/21 03/24/21 03/24/21





  06:02 08:33 08:33


 


WBC   


 


RBC   3.51 L 


 


Hgb   11.4 L 


 


Hct   32.9 L 


 


Neutrophils #   


 


Lymphocytes #   0.8 L 


 


PT   


 


INR   


 


APTT   


 


ABG pH   


 


ABG pCO2   


 


ABG pO2   


 


ABG Total CO2   


 


ABG O2 Saturation   


 


ABG Hematocrit   


 


ABG Sodium   


 


ABG Potassium   


 


ABG Ionized Calcium   


 


ABG Glucose   


 


Hemoglobin   


 


Sodium    134 L


 


Potassium   


 


Chloride    108 H


 


Carbon Dioxide   


 


BUN   


 


Creatinine   


 


Glucose    231 H


 


POC Glucose (mg/dL)  224 H  


 


Hemoglobin A1c   


 


Calcium    7.7 L


 


Magnesium   


 


AST   


 


Alkaline Phosphatase   


 


Troponin I   


 


Total Protein   


 


Albumin   


 


Triglycerides   


 


Cholesterol   


 


LDL Cholesterol, Calc   


 


HDL Cholesterol   


 


Arterial Blood Potassium   


 


Arterial Blood Glucose   


 


Urine Protein   


 


Urine Glucose (UA)   


 


Urine Ketones   


 


Crossmatch   














  03/24/21 03/24/21 03/24/21





  08:33 11:18 11:58


 


WBC   


 


RBC   


 


Hgb   


 


Hct   


 


Neutrophils #   


 


Lymphocytes #   


 


PT   


 


INR   


 


APTT  32.6 H  


 


ABG pH   


 


ABG pCO2   


 


ABG pO2   


 


ABG Total CO2   


 


ABG O2 Saturation   


 


ABG Hematocrit   


 


ABG Sodium   


 


ABG Potassium   


 


ABG Ionized Calcium   


 


ABG Glucose   


 


Hemoglobin   


 


Sodium   


 


Potassium   


 


Chloride   


 


Carbon Dioxide   


 


BUN   


 


Creatinine   


 


Glucose   


 


POC Glucose (mg/dL)   197 H  226 H


 


Hemoglobin A1c   


 


Calcium   


 


Magnesium   


 


AST   


 


Alkaline Phosphatase   


 


Troponin I   


 


Total Protein   


 


Albumin   


 


Triglycerides   


 


Cholesterol   


 


LDL Cholesterol, Calc   


 


HDL Cholesterol   


 


Arterial Blood Potassium   


 


Arterial Blood Glucose   


 


Urine Protein   


 


Urine Glucose (UA)   


 


Urine Ketones   


 


Crossmatch   














  03/24/21 03/24/21 03/24/21





  17:06 17:56 20:24


 


WBC   


 


RBC   


 


Hgb   


 


Hct   


 


Neutrophils #   


 


Lymphocytes #   


 


PT   


 


INR   


 


APTT   47.3 H 


 


ABG pH   


 


ABG pCO2   


 


ABG pO2   


 


ABG Total CO2   


 


ABG O2 Saturation   


 


ABG Hematocrit   


 


ABG Sodium   


 


ABG Potassium   


 


ABG Ionized Calcium   


 


ABG Glucose   


 


Hemoglobin   


 


Sodium   


 


Potassium   


 


Chloride   


 


Carbon Dioxide   


 


BUN   


 


Creatinine   


 


Glucose   


 


POC Glucose (mg/dL)  142 H   135 H


 


Hemoglobin A1c   


 


Calcium   


 


Magnesium   


 


AST   


 


Alkaline Phosphatase   


 


Troponin I   


 


Total Protein   


 


Albumin   


 


Triglycerides   


 


Cholesterol   


 


LDL Cholesterol, Calc   


 


HDL Cholesterol   


 


Arterial Blood Potassium   


 


Arterial Blood Glucose   


 


Urine Protein   


 


Urine Glucose (UA)   


 


Urine Ketones   


 


Crossmatch   














  03/25/21 03/25/21 03/25/21





  06:00 08:28 08:28


 


WBC   


 


RBC   


 


Hgb   


 


Hct   


 


Neutrophils #   


 


Lymphocytes #   


 


PT   


 


INR   


 


APTT   


 


ABG pH   


 


ABG pCO2   


 


ABG pO2   


 


ABG Total CO2   


 


ABG O2 Saturation   


 


ABG Hematocrit   


 


ABG Sodium   


 


ABG Potassium   


 


ABG Ionized Calcium   


 


ABG Glucose   


 


Hemoglobin   


 


Sodium   


 


Potassium   


 


Chloride    109 H


 


Carbon Dioxide   


 


BUN   


 


Creatinine   


 


Glucose    176 H


 


POC Glucose (mg/dL)  276 H  


 


Hemoglobin A1c   


 


Calcium   


 


Magnesium   


 


AST   


 


Alkaline Phosphatase   


 


Troponin I   


 


Total Protein   


 


Albumin   


 


Triglycerides   


 


Cholesterol   


 


LDL Cholesterol, Calc   


 


HDL Cholesterol   


 


Arterial Blood Potassium   


 


Arterial Blood Glucose   


 


Urine Protein   


 


Urine Glucose (UA)   


 


Urine Ketones   


 


Crossmatch   See Detail 














  03/25/21 03/25/21 03/25/21





  08:28 08:28 11:59


 


WBC   


 


RBC  3.70 L  


 


Hgb  11.7 L  


 


Hct  34.0 L  


 


Neutrophils #   


 


Lymphocytes #  0.7 L  


 


PT   


 


INR   


 


APTT   42.2 H 


 


ABG pH   


 


ABG pCO2   


 


ABG pO2   


 


ABG Total CO2   


 


ABG O2 Saturation   


 


ABG Hematocrit   


 


ABG Sodium   


 


ABG Potassium   


 


ABG Ionized Calcium   


 


ABG Glucose   


 


Hemoglobin   


 


Sodium   


 


Potassium   


 


Chloride   


 


Carbon Dioxide   


 


BUN   


 


Creatinine   


 


Glucose   


 


POC Glucose (mg/dL)    142 H


 


Hemoglobin A1c   


 


Calcium   


 


Magnesium   


 


AST   


 


Alkaline Phosphatase   


 


Troponin I   


 


Total Protein   


 


Albumin   


 


Triglycerides   


 


Cholesterol   


 


LDL Cholesterol, Calc   


 


HDL Cholesterol   


 


Arterial Blood Potassium   


 


Arterial Blood Glucose   


 


Urine Protein   


 


Urine Glucose (UA)   


 


Urine Ketones   


 


Crossmatch   














  03/25/21 03/25/21 03/25/21





  16:01 16:30 20:20


 


WBC   


 


RBC   


 


Hgb   


 


Hct   


 


Neutrophils #   


 


Lymphocytes #   


 


PT   


 


INR   


 


APTT  47.2 H  


 


ABG pH   


 


ABG pCO2   


 


ABG pO2   


 


ABG Total CO2   


 


ABG O2 Saturation   


 


ABG Hematocrit   


 


ABG Sodium   


 


ABG Potassium   


 


ABG Ionized Calcium   


 


ABG Glucose   


 


Hemoglobin   


 


Sodium   


 


Potassium   


 


Chloride   


 


Carbon Dioxide   


 


BUN   


 


Creatinine   


 


Glucose   


 


POC Glucose (mg/dL)   102 H  140 H


 


Hemoglobin A1c   


 


Calcium   


 


Magnesium   


 


AST   


 


Alkaline Phosphatase   


 


Troponin I   


 


Total Protein   


 


Albumin   


 


Triglycerides   


 


Cholesterol   


 


LDL Cholesterol, Calc   


 


HDL Cholesterol   


 


Arterial Blood Potassium   


 


Arterial Blood Glucose   


 


Urine Protein   


 


Urine Glucose (UA)   


 


Urine Ketones   


 


Crossmatch   














  03/26/21 03/26/21 03/26/21





  05:18 06:48 08:38


 


WBC   


 


RBC   


 


Hgb   


 


Hct   


 


Neutrophils #   


 


Lymphocytes #   


 


PT   


 


INR   


 


APTT   


 


ABG pH   


 


ABG pCO2   


 


ABG pO2    307 H


 


ABG Total CO2    25 H


 


ABG O2 Saturation    100.0 H


 


ABG Hematocrit    33 L


 


ABG Sodium   


 


ABG Potassium   


 


ABG Ionized Calcium   


 


ABG Glucose    154 H


 


Hemoglobin    10.8 L


 


Sodium   


 


Potassium   


 


Chloride   


 


Carbon Dioxide   


 


BUN   


 


Creatinine   


 


Glucose   


 


POC Glucose (mg/dL)  110 H  132 H 


 


Hemoglobin A1c   


 


Calcium   


 


Magnesium   


 


AST   


 


Alkaline Phosphatase   


 


Troponin I   


 


Total Protein   


 


Albumin   


 


Triglycerides   


 


Cholesterol   


 


LDL Cholesterol, Calc   


 


HDL Cholesterol   


 


Arterial Blood Potassium   


 


Arterial Blood Glucose    154 H


 


Urine Protein   


 


Urine Glucose (UA)   


 


Urine Ketones   


 


Crossmatch   














  03/26/21 03/26/21 03/26/21





  10:39 11:38 11:52


 


WBC   


 


RBC   


 


Hgb   


 


Hct   


 


Neutrophils #   


 


Lymphocytes #   


 


PT   


 


INR   


 


APTT   


 


ABG pH   


 


ABG pCO2   


 


ABG pO2  121 H  142 H  371 H


 


ABG Total CO2  25 H  25 H  25 H


 


ABG O2 Saturation  99.2 H  99.6 H  100.0 H


 


ABG Hematocrit  30 L  31 L  28 L


 


ABG Sodium   


 


ABG Potassium   


 


ABG Ionized Calcium    4.3 L


 


ABG Glucose  171 H  183 H  181 H


 


Hemoglobin  9.9 L  10.1 L  9.3 L


 


Sodium   


 


Potassium   


 


Chloride   


 


Carbon Dioxide   


 


BUN   


 


Creatinine   


 


Glucose   


 


POC Glucose (mg/dL)   


 


Hemoglobin A1c   


 


Calcium   


 


Magnesium   


 


AST   


 


Alkaline Phosphatase   


 


Troponin I   


 


Total Protein   


 


Albumin   


 


Triglycerides   


 


Cholesterol   


 


LDL Cholesterol, Calc   


 


HDL Cholesterol   


 


Arterial Blood Potassium   


 


Arterial Blood Glucose  171 H  183 H  181 H


 


Urine Protein   


 


Urine Glucose (UA)   


 


Urine Ketones   


 


Crossmatch   














  03/26/21 03/26/21 03/26/21





  12:25 12:49 13:25


 


WBC   


 


RBC   


 


Hgb   


 


Hct   


 


Neutrophils #   


 


Lymphocytes #   


 


PT   


 


INR   


 


APTT   


 


ABG pH  7.34 L  7.30 L 


 


ABG pCO2   47 H 


 


ABG pO2  292 H  266 H  324 H


 


ABG Total CO2  25 H   25 H


 


ABG O2 Saturation  100.0 H  100.0 H  100.0 H


 


ABG Hematocrit  25 L  25 L  25 L


 


ABG Sodium  133 L   134 L


 


ABG Potassium  5.2 H  


 


ABG Ionized Calcium  4.2 L  4.3 L  4.2 L


 


ABG Glucose  260 H  276 H  311 H


 


Hemoglobin  8.2 L  8.2 L  8.0 L


 


Sodium   


 


Potassium   


 


Chloride   


 


Carbon Dioxide   


 


BUN   


 


Creatinine   


 


Glucose   


 


POC Glucose (mg/dL)   


 


Hemoglobin A1c   


 


Calcium   


 


Magnesium   


 


AST   


 


Alkaline Phosphatase   


 


Troponin I   


 


Total Protein   


 


Albumin   


 


Triglycerides   


 


Cholesterol   


 


LDL Cholesterol, Calc   


 


HDL Cholesterol   


 


Arterial Blood Potassium  5.2 H  


 


Arterial Blood Glucose  260 H  276 H  311 H


 


Urine Protein   


 


Urine Glucose (UA)   


 


Urine Ketones   


 


Crossmatch   














  03/26/21 03/26/21 03/26/21





  14:06 14:33 16:43


 


WBC   


 


RBC   


 


Hgb   


 


Hct   


 


Neutrophils #   


 


Lymphocytes #   


 


PT   


 


INR   


 


APTT   


 


ABG pH   


 


ABG pCO2   


 


ABG pO2  328 H  294 H 


 


ABG Total CO2  25 H  25 H 


 


ABG O2 Saturation  100.0 H  100.0 H 


 


ABG Hematocrit  24 L  24 L 


 


ABG Sodium   


 


ABG Potassium   


 


ABG Ionized Calcium  4.3 L  4.3 L 


 


ABG Glucose  301 H  280 H 


 


Hemoglobin  7.8 L  7.8 L 


 


Sodium   


 


Potassium   


 


Chloride   


 


Carbon Dioxide   


 


BUN   


 


Creatinine   


 


Glucose   


 


POC Glucose (mg/dL)    183 H


 


Hemoglobin A1c   


 


Calcium   


 


Magnesium   


 


AST   


 


Alkaline Phosphatase   


 


Troponin I   


 


Total Protein   


 


Albumin   


 


Triglycerides   


 


Cholesterol   


 


LDL Cholesterol, Calc   


 


HDL Cholesterol   


 


Arterial Blood Potassium   


 


Arterial Blood Glucose  301 H  280 H 


 


Urine Protein   


 


Urine Glucose (UA)   


 


Urine Ketones   


 


Crossmatch   














  03/26/21 03/26/21 03/26/21





  16:45 16:45 16:45


 


WBC   


 


RBC  3.06 L  


 


Hgb  9.8 L D  


 


Hct  28.3 L  


 


Neutrophils #   


 


Lymphocytes #   


 


PT   12.2 H 


 


INR   1.2 H 


 


APTT   


 


ABG pH   


 


ABG pCO2   


 


ABG pO2   


 


ABG Total CO2   


 


ABG O2 Saturation   


 


ABG Hematocrit   


 


ABG Sodium   


 


ABG Potassium   


 


ABG Ionized Calcium   


 


ABG Glucose   


 


Hemoglobin   


 


Sodium    136 L


 


Potassium   


 


Chloride   


 


Carbon Dioxide   


 


BUN   


 


Creatinine    0.63 L


 


Glucose    173 H


 


POC Glucose (mg/dL)   


 


Hemoglobin A1c   


 


Calcium    7.9 L


 


Magnesium    2.7 H


 


AST   


 


Alkaline Phosphatase    34 L


 


Troponin I   


 


Total Protein    4.3 L


 


Albumin    2.4 L


 


Triglycerides   


 


Cholesterol   


 


LDL Cholesterol, Calc   


 


HDL Cholesterol   


 


Arterial Blood Potassium   


 


Arterial Blood Glucose   


 


Urine Protein   


 


Urine Glucose (UA)   


 


Urine Ketones   


 


Crossmatch   














  03/26/21 03/26/21 03/26/21





  17:09 17:27 18:13


 


WBC   


 


RBC   


 


Hgb   


 


Hct   


 


Neutrophils #   


 


Lymphocytes #   


 


PT   


 


INR   


 


APTT   


 


ABG pH   


 


ABG pCO2   


 


ABG pO2   155 H 


 


ABG Total CO2   26 H 


 


ABG O2 Saturation   99.2 H 


 


ABG Hematocrit   


 


ABG Sodium   


 


ABG Potassium   


 


ABG Ionized Calcium   


 


ABG Glucose   


 


Hemoglobin   


 


Sodium   


 


Potassium   


 


Chloride   


 


Carbon Dioxide   


 


BUN   


 


Creatinine   


 


Glucose   


 


POC Glucose (mg/dL)  171 H   132 H


 


Hemoglobin A1c   


 


Calcium   


 


Magnesium   


 


AST   


 


Alkaline Phosphatase   


 


Troponin I   


 


Total Protein   


 


Albumin   


 


Triglycerides   


 


Cholesterol   


 


LDL Cholesterol, Calc   


 


HDL Cholesterol   


 


Arterial Blood Potassium   


 


Arterial Blood Glucose   


 


Urine Protein   


 


Urine Glucose (UA)   


 


Urine Ketones   


 


Crossmatch   














  03/26/21 03/26/21 03/26/21





  18:56 18:57 20:06


 


WBC   


 


RBC   2.89 L 


 


Hgb   9.1 L 


 


Hct   26.5 L 


 


Neutrophils #   


 


Lymphocytes #   0.4 L 


 


PT   


 


INR   


 


APTT   


 


ABG pH   


 


ABG pCO2   


 


ABG pO2   


 


ABG Total CO2   


 


ABG O2 Saturation   


 


ABG Hematocrit   


 


ABG Sodium   


 


ABG Potassium   


 


ABG Ionized Calcium   


 


ABG Glucose   


 


Hemoglobin   


 


Sodium   


 


Potassium   


 


Chloride   


 


Carbon Dioxide   


 


BUN   


 


Creatinine   


 


Glucose   


 


POC Glucose (mg/dL)  133 H   161 H


 


Hemoglobin A1c   


 


Calcium   


 


Magnesium   


 


AST   


 


Alkaline Phosphatase   


 


Troponin I   


 


Total Protein   


 


Albumin   


 


Triglycerides   


 


Cholesterol   


 


LDL Cholesterol, Calc   


 


HDL Cholesterol   


 


Arterial Blood Potassium   


 


Arterial Blood Glucose   


 


Urine Protein   


 


Urine Glucose (UA)   


 


Urine Ketones   


 


Crossmatch   














  03/26/21 03/26/21 03/26/21





  20:55 20:57 20:59


 


WBC   


 


RBC   2.71 L 


 


Hgb   8.4 L 


 


Hct   24.8 L 


 


Neutrophils #   7.9 H 


 


Lymphocytes #   0.4 L 


 


PT   


 


INR   


 


APTT   


 


ABG pH   


 


ABG pCO2   


 


ABG pO2   


 


ABG Total CO2  26 H  


 


ABG O2 Saturation  98.6 H  


 


ABG Hematocrit   


 


ABG Sodium   


 


ABG Potassium   


 


ABG Ionized Calcium   


 


ABG Glucose   


 


Hemoglobin   


 


Sodium   


 


Potassium   


 


Chloride   


 


Carbon Dioxide   


 


BUN   


 


Creatinine   


 


Glucose   


 


POC Glucose (mg/dL)    152 H


 


Hemoglobin A1c   


 


Calcium   


 


Magnesium   


 


AST   


 


Alkaline Phosphatase   


 


Troponin I   


 


Total Protein   


 


Albumin   


 


Triglycerides   


 


Cholesterol   


 


LDL Cholesterol, Calc   


 


HDL Cholesterol   


 


Arterial Blood Potassium   


 


Arterial Blood Glucose   


 


Urine Protein   


 


Urine Glucose (UA)   


 


Urine Ketones   


 


Crossmatch   














  03/26/21 03/26/21 03/27/21





  22:03 23:02 00:06


 


WBC   


 


RBC   


 


Hgb   


 


Hct   


 


Neutrophils #   


 


Lymphocytes #   


 


PT   


 


INR   


 


APTT   


 


ABG pH   


 


ABG pCO2   


 


ABG pO2   


 


ABG Total CO2   


 


ABG O2 Saturation   


 


ABG Hematocrit   


 


ABG Sodium   


 


ABG Potassium   


 


ABG Ionized Calcium   


 


ABG Glucose   


 


Hemoglobin   


 


Sodium   


 


Potassium   


 


Chloride   


 


Carbon Dioxide   


 


BUN   


 


Creatinine   


 


Glucose   


 


POC Glucose (mg/dL)  167 H  144 H  131 H


 


Hemoglobin A1c   


 


Calcium   


 


Magnesium   


 


AST   


 


Alkaline Phosphatase   


 


Troponin I   


 


Total Protein   


 


Albumin   


 


Triglycerides   


 


Cholesterol   


 


LDL Cholesterol, Calc   


 


HDL Cholesterol   


 


Arterial Blood Potassium   


 


Arterial Blood Glucose   


 


Urine Protein   


 


Urine Glucose (UA)   


 


Urine Ketones   


 


Crossmatch   














  03/27/21 03/27/21 03/27/21





  01:09 04:05 04:17


 


WBC   


 


RBC    2.74 L


 


Hgb    8.8 L


 


Hct    24.9 L


 


Neutrophils #    8.0 H


 


Lymphocytes #    0.8 L


 


PT   


 


INR   


 


APTT   


 


ABG pH   


 


ABG pCO2   


 


ABG pO2   


 


ABG Total CO2   


 


ABG O2 Saturation   


 


ABG Hematocrit   


 


ABG Sodium   


 


ABG Potassium   


 


ABG Ionized Calcium   


 


ABG Glucose   


 


Hemoglobin   


 


Sodium   


 


Potassium   


 


Chloride   


 


Carbon Dioxide   


 


BUN   


 


Creatinine   


 


Glucose   


 


POC Glucose (mg/dL)  133 H  123 H 


 


Hemoglobin A1c   


 


Calcium   


 


Magnesium   


 


AST   


 


Alkaline Phosphatase   


 


Troponin I   


 


Total Protein   


 


Albumin   


 


Triglycerides   


 


Cholesterol   


 


LDL Cholesterol, Calc   


 


HDL Cholesterol   


 


Arterial Blood Potassium   


 


Arterial Blood Glucose   


 


Urine Protein   


 


Urine Glucose (UA)   


 


Urine Ketones   


 


Crossmatch   














  03/27/21 03/27/21 03/27/21





  04:17 05:11 07:07


 


WBC   


 


RBC   


 


Hgb   


 


Hct   


 


Neutrophils #   


 


Lymphocytes #   


 


PT   


 


INR   


 


APTT   


 


ABG pH   


 


ABG pCO2   


 


ABG pO2   


 


ABG Total CO2   


 


ABG O2 Saturation   


 


ABG Hematocrit   


 


ABG Sodium   


 


ABG Potassium   


 


ABG Ionized Calcium   


 


ABG Glucose   


 


Hemoglobin   


 


Sodium  135 L  


 


Potassium  5.4 H  


 


Chloride  108 H  


 


Carbon Dioxide   


 


BUN   


 


Creatinine   


 


Glucose  117 H  


 


POC Glucose (mg/dL)   129 H  171 H


 


Hemoglobin A1c   


 


Calcium  7.6 L  


 


Magnesium   


 


AST  77 H  


 


Alkaline Phosphatase   


 


Troponin I   


 


Total Protein  4.7 L  


 


Albumin  2.8 L  


 


Triglycerides   


 


Cholesterol   


 


LDL Cholesterol, Calc   


 


HDL Cholesterol   


 


Arterial Blood Potassium   


 


Arterial Blood Glucose   


 


Urine Protein   


 


Urine Glucose (UA)   


 


Urine Ketones   


 


Crossmatch   














  03/27/21 03/27/21 03/27/21





  08:07 09:19 10:15


 


WBC   


 


RBC   


 


Hgb   


 


Hct   


 


Neutrophils #   


 


Lymphocytes #   


 


PT   


 


INR   


 


APTT   


 


ABG pH   


 


ABG pCO2   


 


ABG pO2   


 


ABG Total CO2   


 


ABG O2 Saturation   


 


ABG Hematocrit   


 


ABG Sodium   


 


ABG Potassium   


 


ABG Ionized Calcium   


 


ABG Glucose   


 


Hemoglobin   


 


Sodium   


 


Potassium   


 


Chloride   


 


Carbon Dioxide   


 


BUN   


 


Creatinine   


 


Glucose   


 


POC Glucose (mg/dL)  171 H  247 H  206 H


 


Hemoglobin A1c   


 


Calcium   


 


Magnesium   


 


AST   


 


Alkaline Phosphatase   


 


Troponin I   


 


Total Protein   


 


Albumin   


 


Triglycerides   


 


Cholesterol   


 


LDL Cholesterol, Calc   


 


HDL Cholesterol   


 


Arterial Blood Potassium   


 


Arterial Blood Glucose   


 


Urine Protein   


 


Urine Glucose (UA)   


 


Urine Ketones   


 


Crossmatch   














  03/27/21 03/27/21 03/27/21





  11:07 13:19 14:02


 


WBC   


 


RBC   


 


Hgb   


 


Hct   


 


Neutrophils #   


 


Lymphocytes #   


 


PT   


 


INR   


 


APTT   


 


ABG pH   


 


ABG pCO2   


 


ABG pO2   


 


ABG Total CO2   


 


ABG O2 Saturation   


 


ABG Hematocrit   


 


ABG Sodium   


 


ABG Potassium   


 


ABG Ionized Calcium   


 


ABG Glucose   


 


Hemoglobin   


 


Sodium   


 


Potassium   


 


Chloride   


 


Carbon Dioxide   


 


BUN   


 


Creatinine   


 


Glucose   


 


POC Glucose (mg/dL)  173 H  146 H  154 H


 


Hemoglobin A1c   


 


Calcium   


 


Magnesium   


 


AST   


 


Alkaline Phosphatase   


 


Troponin I   


 


Total Protein   


 


Albumin   


 


Triglycerides   


 


Cholesterol   


 


LDL Cholesterol, Calc   


 


HDL Cholesterol   


 


Arterial Blood Potassium   


 


Arterial Blood Glucose   


 


Urine Protein   


 


Urine Glucose (UA)   


 


Urine Ketones   


 


Crossmatch   














  03/27/21 03/27/21 03/27/21





  15:17 16:19 17:11


 


WBC   


 


RBC   


 


Hgb   


 


Hct   


 


Neutrophils #   


 


Lymphocytes #   


 


PT   


 


INR   


 


APTT   


 


ABG pH   


 


ABG pCO2   


 


ABG pO2   


 


ABG Total CO2   


 


ABG O2 Saturation   


 


ABG Hematocrit   


 


ABG Sodium   


 


ABG Potassium   


 


ABG Ionized Calcium   


 


ABG Glucose   


 


Hemoglobin   


 


Sodium   


 


Potassium   


 


Chloride   


 


Carbon Dioxide   


 


BUN   


 


Creatinine   


 


Glucose   


 


POC Glucose (mg/dL)  138 H  145 H  156 H


 


Hemoglobin A1c   


 


Calcium   


 


Magnesium   


 


AST   


 


Alkaline Phosphatase   


 


Troponin I   


 


Total Protein   


 


Albumin   


 


Triglycerides   


 


Cholesterol   


 


LDL Cholesterol, Calc   


 


HDL Cholesterol   


 


Arterial Blood Potassium   


 


Arterial Blood Glucose   


 


Urine Protein   


 


Urine Glucose (UA)   


 


Urine Ketones   


 


Crossmatch   














  03/27/21 03/27/21 03/27/21





  18:29 20:57 23:24


 


WBC   


 


RBC   


 


Hgb   


 


Hct   


 


Neutrophils #   


 


Lymphocytes #   


 


PT   


 


INR   


 


APTT   


 


ABG pH   


 


ABG pCO2   


 


ABG pO2   


 


ABG Total CO2   


 


ABG O2 Saturation   


 


ABG Hematocrit   


 


ABG Sodium   


 


ABG Potassium   


 


ABG Ionized Calcium   


 


ABG Glucose   


 


Hemoglobin   


 


Sodium   


 


Potassium   


 


Chloride   


 


Carbon Dioxide   


 


BUN   


 


Creatinine   


 


Glucose   


 


POC Glucose (mg/dL)  194 H  160 H  141 H


 


Hemoglobin A1c   


 


Calcium   


 


Magnesium   


 


AST   


 


Alkaline Phosphatase   


 


Troponin I   


 


Total Protein   


 


Albumin   


 


Triglycerides   


 


Cholesterol   


 


LDL Cholesterol, Calc   


 


HDL Cholesterol   


 


Arterial Blood Potassium   


 


Arterial Blood Glucose   


 


Urine Protein   


 


Urine Glucose (UA)   


 


Urine Ketones   


 


Crossmatch   














  03/28/21 03/28/21 03/28/21





  01:07 03:42 03:46


 


WBC   


 


RBC    2.41 L


 


Hgb    7.8 L


 


Hct    22.2 L


 


Neutrophils #    8.0 H


 


Lymphocytes #   


 


PT   


 


INR   


 


APTT   


 


ABG pH   


 


ABG pCO2   


 


ABG pO2   


 


ABG Total CO2   


 


ABG O2 Saturation   


 


ABG Hematocrit   


 


ABG Sodium   


 


ABG Potassium   


 


ABG Ionized Calcium   


 


ABG Glucose   


 


Hemoglobin   


 


Sodium   


 


Potassium   


 


Chloride   


 


Carbon Dioxide   


 


BUN   


 


Creatinine   


 


Glucose   


 


POC Glucose (mg/dL)  132 H  133 H 


 


Hemoglobin A1c   


 


Calcium   


 


Magnesium   


 


AST   


 


Alkaline Phosphatase   


 


Troponin I   


 


Total Protein   


 


Albumin   


 


Triglycerides   


 


Cholesterol   


 


LDL Cholesterol, Calc   


 


HDL Cholesterol   


 


Arterial Blood Potassium   


 


Arterial Blood Glucose   


 


Urine Protein   


 


Urine Glucose (UA)   


 


Urine Ketones   


 


Crossmatch   














  03/28/21 03/28/21 03/28/21





  03:46 06:22 11:53


 


WBC   


 


RBC   


 


Hgb   


 


Hct   


 


Neutrophils #   


 


Lymphocytes #   


 


PT   


 


INR   


 


APTT   


 


ABG pH   


 


ABG pCO2   


 


ABG pO2   


 


ABG Total CO2   


 


ABG O2 Saturation   


 


ABG Hematocrit   


 


ABG Sodium   


 


ABG Potassium   


 


ABG Ionized Calcium   


 


ABG Glucose   


 


Hemoglobin   


 


Sodium  136 L  


 


Potassium   


 


Chloride  108 H  


 


Carbon Dioxide  21 L  


 


BUN  21 H  


 


Creatinine   


 


Glucose  126 H  


 


POC Glucose (mg/dL)   148 H  244 H


 


Hemoglobin A1c   


 


Calcium  7.5 L  


 


Magnesium   


 


AST   


 


Alkaline Phosphatase   


 


Troponin I   


 


Total Protein  5.2 L  


 


Albumin  3.0 L  


 


Triglycerides   


 


Cholesterol   


 


LDL Cholesterol, Calc   


 


HDL Cholesterol   


 


Arterial Blood Potassium   


 


Arterial Blood Glucose   


 


Urine Protein   


 


Urine Glucose (UA)   


 


Urine Ketones   


 


Crossmatch   














  03/28/21 03/28/21 03/29/21





  17:24 20:01 03:31


 


WBC   


 


RBC   


 


Hgb   


 


Hct   


 


Neutrophils #   


 


Lymphocytes #   


 


PT   


 


INR   


 


APTT   


 


ABG pH   


 


ABG pCO2   


 


ABG pO2   


 


ABG Total CO2   


 


ABG O2 Saturation   


 


ABG Hematocrit   


 


ABG Sodium   


 


ABG Potassium   


 


ABG Ionized Calcium   


 


ABG Glucose   


 


Hemoglobin   


 


Sodium   


 


Potassium   


 


Chloride   


 


Carbon Dioxide   


 


BUN   


 


Creatinine   


 


Glucose   


 


POC Glucose (mg/dL)  302 H  287 H  218 H


 


Hemoglobin A1c   


 


Calcium   


 


Magnesium   


 


AST   


 


Alkaline Phosphatase   


 


Troponin I   


 


Total Protein   


 


Albumin   


 


Triglycerides   


 


Cholesterol   


 


LDL Cholesterol, Calc   


 


HDL Cholesterol   


 


Arterial Blood Potassium   


 


Arterial Blood Glucose   


 


Urine Protein   


 


Urine Glucose (UA)   


 


Urine Ketones   


 


Crossmatch   














  03/29/21 03/29/21 03/29/21





  04:26 04:26 04:26


 


WBC   


 


RBC  2.16 L  


 


Hgb  7.0 L  


 


Hct  20.5 L  


 


Neutrophils #   


 


Lymphocytes #  0.5 L  


 


PT   


 


INR   


 


APTT   


 


ABG pH   


 


ABG pCO2   


 


ABG pO2   


 


ABG Total CO2   


 


ABG O2 Saturation   


 


ABG Hematocrit   


 


ABG Sodium   


 


ABG Potassium   


 


ABG Ionized Calcium   


 


ABG Glucose   


 


Hemoglobin   


 


Sodium   131 L 


 


Potassium   


 


Chloride   


 


Carbon Dioxide   18 L 


 


BUN   36 H 


 


Creatinine   1.55 H 


 


Glucose   214 H 


 


POC Glucose (mg/dL)   


 


Hemoglobin A1c   


 


Calcium   7.4 L 


 


Magnesium   


 


AST   


 


Alkaline Phosphatase   


 


Troponin I    4.180 H*


 


Total Protein   5.1 L 


 


Albumin   2.9 L 


 


Triglycerides   


 


Cholesterol   


 


LDL Cholesterol, Calc   


 


HDL Cholesterol   


 


Arterial Blood Potassium   


 


Arterial Blood Glucose   


 


Urine Protein   


 


Urine Glucose (UA)   


 


Urine Ketones   


 


Crossmatch   














  03/29/21 03/29/21





  06:06 08:19


 


WBC  


 


RBC  


 


Hgb  


 


Hct  


 


Neutrophils #  


 


Lymphocytes #  


 


PT  


 


INR  


 


APTT  


 


ABG pH  


 


ABG pCO2  


 


ABG pO2  


 


ABG Total CO2  


 


ABG O2 Saturation  


 


ABG Hematocrit  


 


ABG Sodium  


 


ABG Potassium  


 


ABG Ionized Calcium  


 


ABG Glucose  


 


Hemoglobin  


 


Sodium  


 


Potassium  


 


Chloride  


 


Carbon Dioxide  


 


BUN  


 


Creatinine  


 


Glucose  


 


POC Glucose (mg/dL)  218 H  279 H


 


Hemoglobin A1c  


 


Calcium  


 


Magnesium  


 


AST  


 


Alkaline Phosphatase  


 


Troponin I  


 


Total Protein  


 


Albumin  


 


Triglycerides  


 


Cholesterol  


 


LDL Cholesterol, Calc  


 


HDL Cholesterol  


 


Arterial Blood Potassium  


 


Arterial Blood Glucose  


 


Urine Protein  


 


Urine Glucose (UA)  


 


Urine Ketones  


 


Crossmatch  














Assessment and Plan


Assessment: 





This is a 51-year-old gentleman with multiple medical problems who presented to 

the emgeregeny department on 03/22/2021 for chest heaviness, general malaise, 

fatigue.  Patient had multiple CAD and underwent CABG on 03/26/21.  On 03/29/21 

patient had stroke like symptoms (right facial droop, right arm weakness and 

decreased sensation).  NO IV tpa since recent surgery.





Right facial droop, weakness arm, numbness and broca aphasia--->resolved to 

right facial droop and arm weakness Seems possible TIA vs acute ischemic stroke.

 Seems embolic due to symptomatic left carotid stenosis another possibility but 

seems unlikely could be cardioembolic.


Symptomatic left internal carotid artery (per CTA >70% per carotid duplex but 

90% per CTA)


Right internal carotid artery is about 50-60% per CTA


Non-STEMI, triple-vessel coronary artery disease status post CABG 4


Diabetes mellitus that seems to be poorly controlled


Daily alcohol intake


Dyslipidemia


Family history of premature coronary artery disease





Plan: 





Contiue dual antiplatelete, currently on aspirin 325, Plavix 75 mg a daily.  

Also continue Lipitor 80 mg daily.


PT, OT and SLP are consulted.


Ordered every 4 hours neuro checks.


Continue cardiac monitoring


Cardiology team does not want vascular surgical team involved and they was 

notified by nurse they want to take care of carotid stenosis.


I ordered MRI the brain.  Regarding when the carotid stenosis should be done 

will hold off until we get the MRI the brain then will recommend time frame to 

get surgery.





Continue thiamine 100 mg daily.  She is also on folic acid 1mg daily.





Please avoid any hypotensive episodes.  Recommend blood pressure systolic 120-

140.





The patient is on CIWA protocol and will defer management to primary team.





Will defer the rest of medical management to the primary team.





Patient was counseled on alcohol cessation.





Plan was discussed with the patient as well as the patient's nurse.





UPDATE:


Patient mother was at bedside and as patient stated earlier, discussed the 

surgical procedure for carotid stenosis and the patient and the mother are in 

agreement for surgery.


I discussed with surgery (which I would recommend carotid stenting) there is 

increased risk of complication stroke and cardiovascular risk factor and they 

would like to proceed.








Thank you for the consultation.





To Tee M.D.


Neuro-hospitalist








Time with Patient: Greater than 30

## 2021-03-29 NOTE — CT
EXAM:

  CT Angiography Head With Intravenous Contrast

 

CLINICAL HISTORY:

  ITS.REASON CT Reason: CODE STROKE

 

TECHNIQUE:

  Axial computed tomographic angiography images of the head with 

intravenous contrast.  CTDI is 14.5 mGy and DLP is 607.7 mGy-cm.  This CT 

exam was performed using one or more of the following dose reduction 

techniques: automated exposure control, adjustment of the mA and/or kV 

according to patient size, and/or use of iterative reconstruction 

technique.

  MIP reconstructed images were created and reviewed.

 

COMPARISON:

  No relevant prior studies available.

 

FINDINGS:

  Right internal carotid artery:  Intracranial segment is patent with no 

significant stenosis.  No aneurysm.

  Right anterior cerebral artery:  No occlusion or significant stenosis.  

No aneurysm.

  Right middle cerebral artery:  No occlusion or significant stenosis.  

No aneurysm.

  Right posterior cerebral artery:  No occlusion or significant stenosis. 

 No aneurysm.

  Right vertebral artery:  Unremarkable.

 

  Left internal carotid artery:  Intracranial segment is patent with no 

significant stenosis.  No aneurysm.

  Left anterior cerebral artery:  No occlusion or significant stenosis.  

No aneurysm.

  Left middle cerebral artery:  No occlusion or significant stenosis.  No 

aneurysm.

  Left posterior cerebral artery:  No occlusion or significant stenosis.  

No aneurysm.

  Left vertebral artery:  Unremarkable.

 

  Basilar artery:  No occlusion or significant stenosis.  No aneurysm.

 

IMPRESSION:     

  No significant stenosis.

 

_______________________________________________

 

EXAM:

  CT Angiography Neck With Intravenous Contrast

 

CLINICAL HISTORY:

  ITS.REASON CT Reason: CODE STROKE

 

TECHNIQUE:

  Axial computed tomographic angiography images of the neck with 

intravenous contrast.  CTDI is 14.5 mGy and DLP is 607.7 mGy-cm.  This CT 

exam was performed using one or more of the following dose reduction 

techniques: automated exposure control, adjustment of the mA and/or kV 

according to patient size, and/or use of iterative reconstruction 

technique.

  MIP reconstructed images were created and reviewed.

 

COMPARISON:

  3/24/2021

 

FINDINGS:

 

 VASCULATURE:

  Right common carotid artery:  No significant stenosis.  No dissection.

  Right internal carotid artery: No significant stenosis.  No dissection.

  Right vertebral artery:  No significant stenosis.  No dissection.

 

  Left common carotid artery:  No significant stenosis.  No dissection.

  Left internal carotid artery: Severe left proximal ICA stenosis. No 

dissection.

  Left vertebral artery:  No significant stenosis.  No dissection.

 

 NECK:

  Bones/joints:  No acute fracture.  No dislocation.

 

Soft tissue emphysema along the chest wall.  Worsened right-sided pleural 

effusion.

Trace left apical pneumothorax.

Median sternotomy wires are in place.

 

 CAROTID STENOSIS REFERENCE USING NASCET CRITERIA:

  % ICA stenosis = (1 - narrowest ICA diameter/diameter of distal 

cervical ICA) x 100.

  Mild - <50% stenosis.

  Moderate - 50-69% stenosis.

  Severe - 70-94% stenosis.

  Near occlusion - 95-99% stenosis.

  Occluded - 100% stenosis.

 

IMPRESSION:     

 

1.  Unchanged severe left proximal ICA stenosis.

2.  Postsurgical changes with soft tissue emphysema along the anterior 

chest wall, worsened right-sided pleural effusion, and trace left apical 

pneumothorax.

## 2021-03-29 NOTE — P.EN
Code stroke note





Code stroke activated at 3:33 am. Arrived on the scene shortly after. Last well 

known time was 2 am. The patient's RN noted that upon entering the room, she 

noted that he had a slight R facial droop. He was also slurring his words and 

subsequently noted to be confused with R arm weakness. NIH score was calculated 

to be 7. The patient underwent a stat CTA head and neck revealed no acute 

changes. Following the CT scan, the patient's NIH improved to 5. Neuro-

intensivist was notified. The patient reported feeling okay with no active 

complaints with neglect noted. Upon examination, the patient is an obese M in 

NAD. Chest hugger in place. EOMI with anisocoria R pupil 3 mm and L pupil 2.5 

mm. Head atraumatic normocephalic. Strength 5/5 throughout with sensation intact

to light touch throughout. R facial droop was noted. CN II-XII intact aside from

facial droop and anisocoria. Finger to nose wnl. Patient alert, oriented to 

person and place only. Blood glucose 218 at bedside.





Assessment/Plan





Evolving non-major vessel CVA


-Neurochecks ordered. Neurology consult placed


-Aspiration and fall precautions


-Patient receiving Aspirin and Plavix


-Neuro intensivist noted that patient not a candidate for tPA. Recommended 

continuing ASA and Plavix.

## 2021-03-29 NOTE — CT
EXAM:

  CT Head Without Intravenous Contrast

 

CLINICAL HISTORY:

  ITS.REASON CT Reason: Neuro deficit, acute, stroke suspected

 

TECHNIQUE:

  Axial computed tomography images of the head/brain without intravenous 

contrast.  CTDI is 14.5 mGy and DLP is 607.7 mGy-cm.  This CT exam was 

performed using one or more of the following dose reduction techniques: 

automated exposure control, adjustment of the mA and/or kV according to 

patient size, and/or use of iterative reconstruction technique.

 

COMPARISON:

  No relevant prior studies available.

 

FINDINGS:

  Brain:  No hemorrhage or mass effect.

  Ventricles:  No hydrocephalus.

  Bones/joints:  Unremarkable.

  Soft tissues:  Unremarkable.

  Sinuses:  Unremarkable.

  Mastoid air cells:  Clear.

 

IMPRESSION:     

  No acute hemorrhage, hydrocephalus, or mass effect.

## 2021-03-29 NOTE — P.PN
Subjective


Progress Note Date: 03/29/21


Principal diagnosis: 





Multivessel coronary artery disease, status post CABG, postoperative day #3.


On today's evaluation of 03/27/2021, the patient is postop day #1.  The patient 

was brought into the intensive care unit and he was hemodynamically stable and 

was extubated at around 9:15 PM yesterday.  This morning, the patient is able to

sit up on a recliner.  He remains extubated.  He remains on oxygen at 2 L per 

minute nasal cannula.  Note that since his surgery, the patient initially 

received 500 mL bolus of albumin 5%, after sitting up on a chair his blood 

pressure dropped again and we had to give another bolus of 500 mL of albumin.  

His urine output is improving.  Overnight, he was taken off the Primacor and he 

was started on levo fed initially 0.05 g and currently he is running at 0.11 

mcg/kg per minute.  Urine output is adequate for now.  BP is showing a systolic 

of 100-110.  Cardiac output is 5.4 with an index of 2.7.  No cardiac 

arrhythmias.  No atrial fibrillation.  His cardiac rhythm is sinus.  Chest tubes

are still in place.  Output from the mediastinal was 500 mL yesterday and left 

pleural is 300.  The patient has a total of 3 chest tubes left pleural, 

mediastinal 2.  The patient otherwise is doing well.  He did have a bout of 

blurred vision in his left eye yesterday without any neurologic deficit and 

currently is neurologically intact.  No aphasia.  No headaches.  No altered 

mentation.  Insulin drip is running at 1.5 units an hour and nitroglycerin drip 

drip is running at 5 units an hour regarding his radial artery harvest.  

Otherwise, no other significant events.  Chest x-ray shows adequate expansion of

both lungs.  No evidence of any pneumothorax.  There is a gastric bubble.  [place]-

Raj catheter is still in place.  Hemoglobin today is at 8.8.  Rest of the blood

work is all within normal limits.





On 03/28/2021 the patient is postop day #2.  Is able to sit up on a chair.  The 

[place]-Raj catheter has been removed.  The patient has the lower limb mediastinal

chest tubes still in place and output has been noted and output has considerably

dropped since yesterday.  He is hemodynamically requiring some pressors still 

and the patient is on 0.04 g kilogram per minute of norepinephrine infusion.  

Urine output is in order of 20 mL an hour.  The patient remains on insulin drip 

at 4.5 units an hour.  Blood sugars of been under adequate control.  Pulmonary 

status is stable.  He is on oxygen at 2 L, and he was transitioned to room air o

xygen.  He has adequate pain control for now and this morning he refers this 

pain as being around 7 out of 10.  The chest x-ray from today showing a small 

pleural effusion on the left.  Right lung is showing some fluid within the 

fissure.  No consolidation.  No pneumothorax.  Chest tubes are all in place.  

There is some gastric bubble and the patient is passing gas and he is tolerating

his diet without any major difficulties.  Hemoglobin from today is 7.8





Patient was reevaluated today on 3/29/2021, he is postoperative day #3, CABG 4 

vessels, patient is sitting in bed, he is in the cardiac stepdown unit, not in 

any distress, receiving a unit of packed RBCs as we speak.  Apparently his hemog

lobin was low, and he'll be receiving Lasix after his packed RBC transfusion.  

Patient has a strong productive cough, he is compliant with his incentive 

spirometry, continues to have left pleural chest tube in place, he also had 

right sided facial droop with slurring words and right arm weakness hence code 

stroke was called, but his CT of the chest showed no acute change.  Neurology is

following.  Chest x-ray showed bibasilar atelectasis especially at the left 

base, and possibly a small left pleural effusion.  Hemoglobin today was 7 and 

the WBC count was 6.3.  Electrolytes are normal bicarb is a bit low at 18.  

Slight worsening of renal status is noted with a BUN of 36 creatinine is 1.55.  

Troponin is 4.180.











Objective





- Vital Signs


Vital signs: 


                                   Vital Signs











Temp  99 F   03/29/21 15:25


 


Pulse  94   03/29/21 16:07


 


Resp  16   03/29/21 15:25


 


BP  132/75   03/29/21 15:25


 


Pulse Ox  97   03/29/21 15:55








                                 Intake & Output











 03/28/21 03/29/21 03/29/21





 18:59 06:59 18:59


 


Intake Total 476  0


 


Output Total 370 800 475


 


Balance 106 800 -475


 


Weight   88.6 kg


 


Intake:   


 


    


 


    .9NS Pressure Bag 36  


 


    Sodium Chloride 0.9% 1, 200  





    000 ml @ 20 mls/hr IV .   





    Q24H [facility] Rx#:[id number]   


 


  Oral 240  


 


  Blood Product   0


 


    Rc Pheresis 2 As3  Unit   0





    [id number]   


 


Output:   


 


  Chest Tube Drainage 100 200 


 


    Left Pleural Chest tube 100 200 


 


    Mediastinal Chest Tubes X 0  





    2   


 


  Urine 270 600 475


 


Other:   


 


  Voiding Method Indwelling Catheter Indwelling Catheter Indwelling Catheter








                       ABP, PAP, CO, CI - Last Documented











Arterial Blood Pressure        81/54


 


Pulmonary Artery Pressure      30/15


 


Cardiac Output                 5.3


 


Cardiac Index                  2.6

















- Exam





CONSTITUTIONAL: Revealed a 51-year-old white male in no distress.


RESPIRATORY: Crackles at the left base, no rhonchi no wheezes, symmetrical chest

expansion.


CARDIOVASCULAR: Normal S1 and S2, no S3 gallop.


GASTROINTESTINAL: Abdomen is soft nontender no megaly no rebound no guarding.


Skin: No rashes.


NEUROLOGIC: Alert oriented 3 focal deficits.


MUSKULOSKELETAL: Equal strength bilaterally, no deformities.


PSYCHIATRIC: Normal mood, affect and normal mental status examination.














- Labs


CBC & Chem 7: 


                                 03/29/21 04:26





                                 03/29/21 04:26


Labs: 


                  Abnormal Lab Results - Last 24 Hours (Table)











  03/28/21 03/28/21 03/29/21 Range/Units





  17:24 20:01 03:31 


 


RBC     (4.30-5.90)  m/uL


 


Hgb     (13.0-17.5)  gm/dL


 


Hct     (39.0-53.0)  %


 


Lymphocytes #     (1.0-4.8)  k/uL


 


Sodium     (137-145)  mmol/L


 


Carbon Dioxide     (22-30)  mmol/L


 


BUN     (9-20)  mg/dL


 


Creatinine     (0.66-1.25)  mg/dL


 


Glucose     (74-99)  mg/dL


 


POC Glucose (mg/dL)  302 H  287 H  218 H  (75-99)  mg/dL


 


Calcium     (8.4-10.2)  mg/dL


 


Troponin I     (0.000-0.034)  ng/mL


 


Total Protein     (6.3-8.2)  g/dL


 


Albumin     (3.5-5.0)  g/dL


 


Crossmatch     














  03/29/21 03/29/21 03/29/21 Range/Units





  04:26 04:26 04:26 


 


RBC   2.16 L   (4.30-5.90)  m/uL


 


Hgb   7.0 L   (13.0-17.5)  gm/dL


 


Hct   20.5 L   (39.0-53.0)  %


 


Lymphocytes #   0.5 L   (1.0-4.8)  k/uL


 


Sodium    131 L  (137-145)  mmol/L


 


Carbon Dioxide    18 L  (22-30)  mmol/L


 


BUN    36 H  (9-20)  mg/dL


 


Creatinine    1.55 H  (0.66-1.25)  mg/dL


 


Glucose    214 H  (74-99)  mg/dL


 


POC Glucose (mg/dL)     (75-99)  mg/dL


 


Calcium    7.4 L  (8.4-10.2)  mg/dL


 


Troponin I     (0.000-0.034)  ng/mL


 


Total Protein    5.1 L  (6.3-8.2)  g/dL


 


Albumin    2.9 L  (3.5-5.0)  g/dL


 


Crossmatch  See Detail    














  03/29/21 03/29/21 03/29/21 Range/Units





  04:26 06:06 08:19 


 


RBC     (4.30-5.90)  m/uL


 


Hgb     (13.0-17.5)  gm/dL


 


Hct     (39.0-53.0)  %


 


Lymphocytes #     (1.0-4.8)  k/uL


 


Sodium     (137-145)  mmol/L


 


Carbon Dioxide     (22-30)  mmol/L


 


BUN     (9-20)  mg/dL


 


Creatinine     (0.66-1.25)  mg/dL


 


Glucose     (74-99)  mg/dL


 


POC Glucose (mg/dL)   218 H  279 H  (75-99)  mg/dL


 


Calcium     (8.4-10.2)  mg/dL


 


Troponin I  4.180 H*    (0.000-0.034)  ng/mL


 


Total Protein     (6.3-8.2)  g/dL


 


Albumin     (3.5-5.0)  g/dL


 


Crossmatch     














  03/29/21 03/29/21 Range/Units





  11:40 16:31 


 


RBC    (4.30-5.90)  m/uL


 


Hgb    (13.0-17.5)  gm/dL


 


Hct    (39.0-53.0)  %


 


Lymphocytes #    (1.0-4.8)  k/uL


 


Sodium    (137-145)  mmol/L


 


Carbon Dioxide    (22-30)  mmol/L


 


BUN    (9-20)  mg/dL


 


Creatinine    (0.66-1.25)  mg/dL


 


Glucose    (74-99)  mg/dL


 


POC Glucose (mg/dL)  282 H  144 H  (75-99)  mg/dL


 


Calcium    (8.4-10.2)  mg/dL


 


Troponin I    (0.000-0.034)  ng/mL


 


Total Protein    (6.3-8.2)  g/dL


 


Albumin    (3.5-5.0)  g/dL


 


Crossmatch    














Assessment and Plan


Assessment: 





Impression:


Status post CABG for triple-vessel coronary artery disease, patient had a four-

vessel CABG.


Status post mitral valve repair.


Postoperative acute blood loss anemia, expected.


Postoperative atelectasis and small left pleural effusion


Acute kidney injury


Life long nonsmoker


Acute TIA versus acute ischemic stroke, being addressed by neurology


History of carotid artery disease.





Recommendation:


Continue present supportive care measures.


Continue GI and DVT prophylaxis


Continue incentive spirometry


Continue CIWA protocol since the patient has history of alcohol abuse.


Continue strict I's and O's.


Continue aspirin statins and beta blockers and brilinta


Increase activity as tolerated


Incentive spirometry


Early ambulation


Wean oxygen as tolerated


Monitor daily labs.


We'll continue to follow








Time with Patient: Less than 30

## 2021-03-30 LAB
ALBUMIN SERPL-MCNC: 3.1 G/DL (ref 3.5–5)
ALP SERPL-CCNC: 69 U/L (ref 38–126)
ALT SERPL-CCNC: 22 U/L (ref 4–49)
ANION GAP SERPL CALC-SCNC: 8 MMOL/L
AST SERPL-CCNC: 45 U/L (ref 17–59)
BASOPHILS # BLD AUTO: 0 K/UL (ref 0–0.2)
BASOPHILS NFR BLD AUTO: 0 %
BUN SERPL-SCNC: 28 MG/DL (ref 9–20)
CALCIUM SPEC-MCNC: 8 MG/DL (ref 8.4–10.2)
CHLORIDE SERPL-SCNC: 106 MMOL/L (ref 98–107)
CO2 SERPL-SCNC: 21 MMOL/L (ref 22–30)
EOSINOPHIL # BLD AUTO: 0.1 K/UL (ref 0–0.7)
EOSINOPHIL NFR BLD AUTO: 2 %
ERYTHROCYTE [DISTWIDTH] IN BLOOD BY AUTOMATED COUNT: 2.86 M/UL (ref 4.3–5.9)
ERYTHROCYTE [DISTWIDTH] IN BLOOD: 14.1 % (ref 11.5–15.5)
GLUCOSE BLD-MCNC: 117 MG/DL (ref 75–99)
GLUCOSE BLD-MCNC: 173 MG/DL (ref 75–99)
GLUCOSE BLD-MCNC: 182 MG/DL (ref 75–99)
GLUCOSE BLD-MCNC: 225 MG/DL (ref 75–99)
GLUCOSE SERPL-MCNC: 113 MG/DL (ref 74–99)
HCT VFR BLD AUTO: 27.1 % (ref 39–53)
HGB BLD-MCNC: 8.8 GM/DL (ref 13–17.5)
LYMPHOCYTES # SPEC AUTO: 0.9 K/UL (ref 1–4.8)
LYMPHOCYTES NFR SPEC AUTO: 13 %
MAGNESIUM SPEC-SCNC: 2.6 MG/DL (ref 1.6–2.3)
MCH RBC QN AUTO: 30.8 PG (ref 25–35)
MCHC RBC AUTO-ENTMCNC: 32.6 G/DL (ref 31–37)
MCV RBC AUTO: 94.5 FL (ref 80–100)
MONOCYTES # BLD AUTO: 0.5 K/UL (ref 0–1)
MONOCYTES NFR BLD AUTO: 7 %
NEUTROPHILS # BLD AUTO: 4.9 K/UL (ref 1.3–7.7)
NEUTROPHILS NFR BLD AUTO: 75 %
PLATELET # BLD AUTO: 236 K/UL (ref 150–450)
POTASSIUM SERPL-SCNC: 4.2 MMOL/L (ref 3.5–5.1)
PROT SERPL-MCNC: 5.8 G/DL (ref 6.3–8.2)
SODIUM SERPL-SCNC: 135 MMOL/L (ref 137–145)
WBC # BLD AUTO: 6.4 K/UL (ref 3.8–10.6)

## 2021-03-30 RX ADMIN — ASPIRIN 81 MG CHEWABLE TABLET SCH MG: 81 TABLET CHEWABLE at 08:09

## 2021-03-30 RX ADMIN — INSULIN DETEMIR SCH UNIT: 100 INJECTION, SOLUTION SUBCUTANEOUS at 06:39

## 2021-03-30 RX ADMIN — Medication SCH MG: at 08:09

## 2021-03-30 RX ADMIN — INSULIN ASPART SCH UNIT: 100 INJECTION, SOLUTION INTRAVENOUS; SUBCUTANEOUS at 12:28

## 2021-03-30 RX ADMIN — IPRATROPIUM BROMIDE AND ALBUTEROL SULFATE SCH ML: .5; 3 SOLUTION RESPIRATORY (INHALATION) at 20:11

## 2021-03-30 RX ADMIN — ATORVASTATIN CALCIUM SCH MG: 80 TABLET, FILM COATED ORAL at 08:09

## 2021-03-30 RX ADMIN — INSULIN ASPART SCH UNIT: 100 INJECTION, SOLUTION INTRAVENOUS; SUBCUTANEOUS at 20:41

## 2021-03-30 RX ADMIN — HYDROCODONE BITARTRATE AND ACETAMINOPHEN PRN EACH: 7.5; 325 TABLET ORAL at 06:17

## 2021-03-30 RX ADMIN — INSULIN ASPART SCH UNIT: 100 INJECTION, SOLUTION INTRAVENOUS; SUBCUTANEOUS at 17:49

## 2021-03-30 RX ADMIN — HEPARIN SODIUM SCH UNIT: 5000 INJECTION, SOLUTION INTRAVENOUS; SUBCUTANEOUS at 15:06

## 2021-03-30 RX ADMIN — HEPARIN SODIUM SCH UNIT: 5000 INJECTION, SOLUTION INTRAVENOUS; SUBCUTANEOUS at 22:55

## 2021-03-30 RX ADMIN — METOPROLOL TARTRATE SCH MG: 25 TABLET, FILM COATED ORAL at 08:09

## 2021-03-30 RX ADMIN — HYDROCODONE BITARTRATE AND ACETAMINOPHEN PRN EACH: 7.5; 325 TABLET ORAL at 22:54

## 2021-03-30 RX ADMIN — DOCUSATE SODIUM AND SENNOSIDES SCH EACH: 50; 8.6 TABLET ORAL at 20:39

## 2021-03-30 RX ADMIN — INSULIN ASPART SCH: 100 INJECTION, SOLUTION INTRAVENOUS; SUBCUTANEOUS at 06:56

## 2021-03-30 RX ADMIN — PANTOPRAZOLE SODIUM SCH MG: 40 TABLET, DELAYED RELEASE ORAL at 06:17

## 2021-03-30 RX ADMIN — HYDROCODONE BITARTRATE AND ACETAMINOPHEN PRN EACH: 7.5; 325 TABLET ORAL at 03:14

## 2021-03-30 RX ADMIN — SODIUM CHLORIDE, PRESERVATIVE FREE SCH ML: 5 INJECTION INTRAVENOUS at 16:48

## 2021-03-30 RX ADMIN — FOLIC ACID SCH MG: 1 TABLET ORAL at 08:09

## 2021-03-30 RX ADMIN — HYDROCODONE BITARTRATE AND ACETAMINOPHEN PRN EACH: 7.5; 325 TABLET ORAL at 11:39

## 2021-03-30 RX ADMIN — MAGNESIUM HYDROXIDE PRN MG: 2400 SUSPENSION ORAL at 22:55

## 2021-03-30 RX ADMIN — IPRATROPIUM BROMIDE AND ALBUTEROL SULFATE SCH ML: .5; 3 SOLUTION RESPIRATORY (INHALATION) at 08:08

## 2021-03-30 RX ADMIN — INSULIN ASPART SCH UNIT: 100 INJECTION, SOLUTION INTRAVENOUS; SUBCUTANEOUS at 20:40

## 2021-03-30 RX ADMIN — METOPROLOL TARTRATE SCH MG: 25 TABLET, FILM COATED ORAL at 20:40

## 2021-03-30 RX ADMIN — HEPARIN SODIUM SCH UNIT: 5000 INJECTION, SOLUTION INTRAVENOUS; SUBCUTANEOUS at 08:09

## 2021-03-30 RX ADMIN — IPRATROPIUM BROMIDE AND ALBUTEROL SULFATE SCH ML: .5; 3 SOLUTION RESPIRATORY (INHALATION) at 15:37

## 2021-03-30 RX ADMIN — IPRATROPIUM BROMIDE AND ALBUTEROL SULFATE SCH ML: .5; 3 SOLUTION RESPIRATORY (INHALATION) at 12:04

## 2021-03-30 RX ADMIN — SODIUM CHLORIDE, PRESERVATIVE FREE SCH ML: 5 INJECTION INTRAVENOUS at 20:44

## 2021-03-30 RX ADMIN — TICAGRELOR SCH MG: 90 TABLET ORAL at 20:39

## 2021-03-30 NOTE — P.PN
Subjective


Progress Note Date: 03/30/21


Principal diagnosis: 





Triple vessel diffuse coronary artery disease, moderate to severe mitral 

regurgitation, moderate left ventricular dysfunction.  Previous medical history 

of insulin-dependent diabetes, family history of premature coronary artery 

disease, EtOH use most days without history of withdrawal, and newly diagnosed 

hyperlipidemia and severe left carotid stenosis. Preop nasal swab positive for 

MSSA





POD #4 coronary artery bypass grafting 4 vessels with the left internal mammary

artery to the left anterior descending artery, reverse saphenous vein graft from

the aorta to the first diagonal artery, left radial artery taken proximally from

the previous vein graft and anastomosed distally to the distal circumflex 

artery, reverse saphenous vein graft from the aorta to the right coronary 

artery, mitral valve repair with posterior annuloplasty using a #28 incomplete 

AnnuloFlex ring, exclusion of the left atrial appendage with a 35 mm AtriClip, 

graft flow measurements using the Medistim system, endoscopic harvesting of the 

left radial artery, endoscopic harvesting of the left greater saphenous vein 

from the groin to just below the knee level, intraoperative transesophageal 

echocardiogram and epi-aortic scanning.





Postoperative acute blood loss anemia, expected given hemodilution and 

cardiopulmonary bypass pump





Facial droop, slurred speech, right arm weakness with resolution less than 3 

hours, likely TIA with no residual and no further occurrence of symptoms





Patient currently sitting up in a recliner on the cardiac stepdown unit no acute

distress.  Complains of postoperative chest pain controlled with current 

medication regimen, actively using incentive spirometry, strong productive 

cough.  Remains in sinus rhythm, hemodynamically stable.  Left pleural chest 

tube remains.  Yesterday morning patient had an episode of right-sided facial 

droop, slurring of words, and right arm weakness.  Code stroke was called, 

patient had stat CT of the brain and CTA of the head and neck demonstrating no 

acute changes.  Neuro-interventionalist was contacted, recommendations were made

for no thrombolytics.  Patient's symptoms resolved in less than 3 hours and has 

had no recurrence.  Currently he is completely neurologically intact, no facial 

droop is present, he is oriented 3, his speech is appropriate and he has equal 

strength bilaterally.  He did ambulate in the hallway yesterday with standby 

assist.  He has been tolerating diet without any episodes of choking.  

Hemoglobin 7.0 yesterday, patient did receive 1 unit packed red blood cells, 

labs pending for follow-up hemoglobin.  No other further concerns.





Objective





- Vital Signs


Vital signs: 


                                   Vital Signs











Temp  98.1 F   03/30/21 04:00


 


Pulse  61   03/30/21 04:00


 


Resp  18   03/30/21 04:00


 


BP  131/75   03/30/21 04:00


 


Pulse Ox  94 L  03/30/21 04:00








                                 Intake & Output











 03/29/21 03/30/21 03/30/21





 18:59 06:59 18:59


 


Intake Total 761 660 


 


Output Total 650 1144 


 


Balance 111 -484 


 


Weight 88.6 kg 91 kg 


 


Intake:   


 


  Oral 480 660 


 


  Blood Product 281  


 


    Rc Pheresis 2 As3  Unit 281  





    [id number]   


 


Output:   


 


  Chest Tube Drainage  50 


 


    Left Pleural Chest tube  50 


 


  Urine 650 975 


 


  Post Void Residual  119 


 


Other:   


 


  Voiding Method Indwelling Catheter Urinal 


 


  # Voids  1 








                       ABP, PAP, CO, CI - Last Documented











Arterial Blood Pressure        81/54


 


Pulmonary Artery Pressure      30/15


 


Cardiac Output                 5.3


 


Cardiac Index                  2.6

















- Exam





CONSTITUTIONAL: Appears comfortable, cooperative, no acute distress


RESPIRATORY:  Lungs sounds diminished bilaterally.  Respirations even, 

nonlabored.  Currently on 2 L nasal cannula with oxygen saturation 94%.  Able to

achieve 750-1000 mL on incentive spirometry.  Strong productive cough.  


CARDIOVASCULAR:  S1, S2 present.  Regular rate and rhythm, sinus rhythm on 

telemetry.  Sternum stable.   Palpable peripheral pulses bilaterally.  

Generalized edema present.  No calf pain or tenderness noted.  Heart hugger in 

place with patient demonstrating appropriate use.  Antiembolism stockings, SCDs 

present.


GASTROINTESTINAL:  Abdomen soft, nontender, nondistended.  Active bowel sounds p

resent 4 quadrants.  Positive flatus.


GENITOURINARY:  Ramirez discontinued yesterday, continues to void 200-250 mL at a 

time, postvoid residual via bladder scan 119 mL


INTEGUMENTARY:  Skin is warm and dry with evidence of good perfusion.  Anterior 

chest incision well approximated and covered with dry intact dressing.  EVH site

well approximated without redness or drainage.  Left radial artery harvest site 

well approximated, patient denies numbness or tingling, able to wiggle all 

fingers and  appropriately, good cap refill.


NEUROLOGIC:  Cranial nerves II through XII intact


MUSKULOSKELETAL:  Able to move all extremities, strength equal bilaterally


PSYCHIATRIC:  Alert and oriented to person place and time, appropriate affect, 

intact judgment and insight


INVASIVE LINES AND TUBES:  Left pleural chest tube present and connected to wall

suction, no air leak present.  Left pleural chest tube with 50 mL serous 

drainage overnight, 400 mL in the last 24 hours.  





- Allied health notes


Allied health notes reviewed: nursing





- Labs


CBC & Chem 7: 


                                 03/29/21 04:26





                                 03/29/21 04:26


Labs: 


                  Abnormal Lab Results - Last 24 Hours (Table)











  03/29/21 03/29/21 03/29/21 Range/Units





  04:26 08:19 11:40 


 


POC Glucose (mg/dL)   279 H  282 H  (75-99)  mg/dL


 


Crossmatch  See Detail    














  03/29/21 03/29/21 03/30/21 Range/Units





  16:31 20:22 06:11 


 


POC Glucose (mg/dL)  144 H  155 H  117 H  (75-99)  mg/dL


 


Crossmatch     














- Imaging and Cardiology


Chest x-ray: image reviewed





Assessment and Plan


Assessment: 





1.  Triple-vessel diffuse coronary artery disease, non-STEMI this admission, 

status post four-vessel CABG


2.  Moderate to severe predominantly centrally directed mitral regurgitation, 

status post mitral valve repair


3.  Insulin-dependent diabetes, hemoglobin A1c 8.2%


4.  Hyperlipidemia, cholesterol 241, triglyceride 200, 


5.  Left internal carotid artery stenosis, greater than 70% per carotid Doppler,

greater than 90% per neck CTA


6.  Lifelong nonsmoker with FEV1 58% of predicted 


7.  Family history of premature coronary artery disease, father and 2 brothers 

diagnosed with CAD with subsequent CABG in their early to mid 50s


8.  EtOH use most days without history of withdrawal


9.  Preop nasal swab positive for MSSA


10.  Postoperative acute blood loss anemia, expected


11.  TIA with symptom resolution less than 3 hours and no recurrence


Plan: 





1.  Continue aspirin, statin, beta blocker therapy.  Will increase beta blocker 

therapy as tolerated.  Load with Brill into this morning then twice daily


2.  Wean O2 as tolerated.  Encourage incentive spirometry use 10 times every 

hour while awake


3.  Increase activity, ambulate as tolerated, out of bed to chair for breakfast 

until bedtime.  PT/OT/cardiac rehab consulted


4.  Will monitor daily labs and x-rays.  Electrolyte replacement per protocol.  


5.  GI/DVT prophylaxis


6.  Pain control with current medication regimen


7.  Insulin management per primary care service.  Needs tight blood sugar c

ontrol for healing


8.  [facility] protocol to monitor for alcohol withdrawal.  Continue thiamine, folic 

acid


9.  Left carotid stenosis intervention to be completed in the future.  Avoid 

hypotension


10.  Likely will discontinue left pleural chest tubes 


11.  Continue neuro checks every 4 hours


12.  MRI not recommended per Dr. Saleem in view of risk of dislodging recent 

clips on conduits as well as no academic benefit


13.  Strict accurate intake and output.  Daily weights.


14.  More recommendations to follow depending on patient's progress


Time with Patient: Greater than 30

## 2021-03-30 NOTE — P.PN
Subjective


Progress Note Date: 03/30/21


Patient was seen at bedside and he stated that he is doing better today compared

to yesterday.  He denies of any further weakness or numbness.


MRI of the brain was this continued by the cardiothoracic team, per risk of 

dislodgin recent clips on conduits as well as they felt there is no academic 

benefits to the MRI.


The Plavix was stopped and the patient was started on Brilinta 90 mg 1 tablet 

twice a day, patient continues to be on aspirin and high dose Lipitor 80 mg 

daily per the cardiothoracic team.


Limited 2-D echo was done and was reported as mildly impaired with ejection 

fraction of 45-50% at.  Basal inferior left ventricle wall motion is 

hypokinetic.  Basal inferior septal left ventricle wall motion is hypokinetic.











Objective





- Vital Signs


Vital signs: 


                                   Vital Signs











Temp  98.9 F   03/30/21 15:57


 


Pulse  94   03/30/21 15:57


 


Resp  18   03/30/21 15:57


 


BP  117/66   03/30/21 15:57


 


Pulse Ox  95   03/30/21 15:57








                                 Intake & Output











 03/29/21 03/30/21 03/30/21





 18:59 06:59 18:59


 


Intake Total 761 660 400


 


Output Total 650 1144 200


 


Balance 111 -484 200


 


Weight 88.6 kg 91 kg 


 


Intake:   


 


  Oral 480 660 400


 


  Blood Product 281  


 


    Rc Pheresis 2 As3  Unit 281  





    [id number]   


 


Output:   


 


  Chest Tube Drainage  50 


 


    Left Pleural Chest tube  50 


 


  Urine 650 975 200


 


  Post Void Residual  119 


 


Other:   


 


  Voiding Method Indwelling Catheter Urinal Urinal


 


  # Voids  1 








                       ABP, PAP, CO, CI - Last Documented











Arterial Blood Pressure        81/54


 


Pulmonary Artery Pressure      30/15


 


Cardiac Output                 5.3


 


Cardiac Index                  2.6

















- Exam


GENERAL: The patient is lying in bed and is not in acute distress.





NEUROLOGICAL:


Higher mental function: The patient is awake, alert, oriented to self, place and

time.  Patient is following commands.  No aphasia and no neglect.


Cranial nerves: The pupils are round, equal and reactive to light and accommo

dation.  Visual fields are full to confrontation throughout.  Extraocular 

movement is intact no nystagmus is noted.  Facial sensation is normal to touch 

throughout.  Normal facial strength.  Hearing is normal bilaterally to hand rub.

 Tongue is midline and moved side-to-side without any difficulty.  No dysarthria

is noted.  Shoulder shrug is normal bilaterally.


Motor: Gait is deferred.  The strength is 5/5 in uppers.   Bilatera lower 

strength: Above gravity and had 4+ bilaterally (limited because of pain).  

Normal tone and bulk.  


Cerebellum: Normal finger to nose  bilaterally.


Sensation: Sensation is normal to touch throughout.


Reflexes (right/left): 1+ throughout.


Plantars are mute bilaterally. 








- Labs


CBC & Chem 7: 


                                 03/30/21 07:58





                                 03/30/21 07:58


Labs: 


                  Abnormal Lab Results - Last 24 Hours (Table)











  03/29/21 03/29/21 03/29/21 Range/Units





  04:26 16:31 20:22 


 


RBC     (4.30-5.90)  m/uL


 


Hgb     (13.0-17.5)  gm/dL


 


Hct     (39.0-53.0)  %


 


Lymphocytes #     (1.0-4.8)  k/uL


 


Sodium     (137-145)  mmol/L


 


Carbon Dioxide     (22-30)  mmol/L


 


BUN     (9-20)  mg/dL


 


Glucose     (74-99)  mg/dL


 


POC Glucose (mg/dL)   144 H  155 H  (75-99)  mg/dL


 


Calcium     (8.4-10.2)  mg/dL


 


Magnesium     (1.6-2.3)  mg/dL


 


Total Protein     (6.3-8.2)  g/dL


 


Albumin     (3.5-5.0)  g/dL


 


Crossmatch  See Detail    














  03/30/21 03/30/21 03/30/21 Range/Units





  06:11 07:58 07:58 


 


RBC   2.86 L   (4.30-5.90)  m/uL


 


Hgb   8.8 L D   (13.0-17.5)  gm/dL


 


Hct   27.1 L   (39.0-53.0)  %


 


Lymphocytes #   0.9 L   (1.0-4.8)  k/uL


 


Sodium    135 L  (137-145)  mmol/L


 


Carbon Dioxide    21 L  (22-30)  mmol/L


 


BUN    28 H  (9-20)  mg/dL


 


Glucose    113 H  (74-99)  mg/dL


 


POC Glucose (mg/dL)  117 H    (75-99)  mg/dL


 


Calcium    8.0 L  (8.4-10.2)  mg/dL


 


Magnesium    2.6 H  (1.6-2.3)  mg/dL


 


Total Protein    5.8 L  (6.3-8.2)  g/dL


 


Albumin    3.1 L  (3.5-5.0)  g/dL


 


Crossmatch     














  03/30/21 Range/Units





  11:43 


 


RBC   (4.30-5.90)  m/uL


 


Hgb   (13.0-17.5)  gm/dL


 


Hct   (39.0-53.0)  %


 


Lymphocytes #   (1.0-4.8)  k/uL


 


Sodium   (137-145)  mmol/L


 


Carbon Dioxide   (22-30)  mmol/L


 


BUN   (9-20)  mg/dL


 


Glucose   (74-99)  mg/dL


 


POC Glucose (mg/dL)  173 H  (75-99)  mg/dL


 


Calcium   (8.4-10.2)  mg/dL


 


Magnesium   (1.6-2.3)  mg/dL


 


Total Protein   (6.3-8.2)  g/dL


 


Albumin   (3.5-5.0)  g/dL


 


Crossmatch   














Assessment and Plan


Assessment: 


This is a 51-year-old gentleman with multiple medical problems who presented to 

the emgeregeny department on 03/22/2021 for chest heaviness, general malaise, 

fatigue.  Patient had multiple CAD and underwent CABG on 03/26/21.  On 03/29/21 

patient had stroke like symptoms (right facial droop, right arm weakness and 

decreased sensation).  NO IV tpa since recent surgery.





Transient ischemic attack (with right facial droop, weakness arm, numbness and 

broca aphasia). Seems embolic due to symptomatic left carotid stenosis another 

possibility but seems unlikely could be cardioembolic.


Symptomatic left internal carotid artery (per CTA >70% per carotid duplex but 

90% per CTA)


Right internal carotid artery is about 50-60% per CTA


Non-STEMI, triple-vessel coronary artery disease status post CABG 4


Diabetes mellitus that seems to be poorly controlled


Daily alcohol intake


Dyslipidemia


Family history of premature coronary artery disease





Plan: 





MRI of the brain was this continued by the cardiothoracic team, per risk of 

dislodgin recent clips on conduits as well as they felt there is no academic 

benefits to the MRI.


The Plavix was stopped and the patient was started on Brilinta 90 mg 1 tablet 

twice a day, patient continues to be on aspirin 81mg daily and high dose Lipitor

80 mg daily per the cardiothoracic team.


Limited 2-D echo was done and was reported as mildly impaired with ejection 

fraction of 45-50% at.  Basal inferior left ventricle wall motion is hypo

kinetic.  Basal inferior septal left ventricle wall motion is hypokinetic.


PT, OT and SLP are consulted.


On q4 hours neuro checks.


Continue cardiac monitoring


Cardiology team does not want vascular surgical team involved and they was 

notified by nurse they want to take care of carotid stenosis.


Continue thiamine 100 mg daily.  He is also on folic acid 1mg daily.





In My opinion the patient will benefit from left carotid stenting within a week 

from 03/29/2021 (04/05/2021).





Please avoid any hypotensive episodes.  Recommend blood pressure systolic 120-

140.





The patient is on CIWA protocol and will defer management to primary team.





Will defer the rest of medical management to the primary team.





Patient was counseled on alcohol cessation.





Neurology will sign off.


Please reconsult if needed.





Plan was discussed with the patient as well as the patient's nurse.





To Tee M.D.


Neuro-hospitalist








Time with Patient: Less than 30

## 2021-03-30 NOTE — P.PN
Subjective


Progress Note Date: 03/30/21


Principal diagnosis: 





Myocardial infarction





Patient is doing well, currently no complaints.  No further episodes of weakness

or numbness.  No speech or visual abnormalities.





Objective





- Vital Signs


Vital signs: 


                                   Vital Signs











Temp  97.9 F   03/30/21 11:42


 


Pulse  84   03/30/21 12:15


 


Resp  20   03/30/21 11:42


 


BP  132/71   03/30/21 11:42


 


Pulse Ox  99   03/30/21 11:42








                                 Intake & Output











 03/29/21 03/30/21 03/30/21





 18:59 06:59 18:59


 


Intake Total 761 660 400


 


Output Total 650 1144 200


 


Balance 111 -484 200


 


Weight 88.6 kg 91 kg 


 


Intake:   


 


  Oral 480 660 400


 


  Blood Product 281  


 


    Rc Pheresis 2 As3  Unit 281  





    [id number]   


 


Output:   


 


  Chest Tube Drainage  50 


 


    Left Pleural Chest tube  50 


 


  Urine 650 975 200


 


  Post Void Residual  119 


 


Other:   


 


  Voiding Method Indwelling Catheter Urinal Urinal


 


  # Voids  1 








                       ABP, PAP, CO, CI - Last Documented











Arterial Blood Pressure        81/54


 


Pulmonary Artery Pressure      30/15


 


Cardiac Output                 5.3


 


Cardiac Index                  2.6

















- Exam





Gen: No acute distress, extubated


HEENT: normocephalic, atraumatic, moist mucous membranes


Resp: Clear bilaterally


CVS: good distal perfusion x 4, regular rate and rhythm without murmurs


GI: soft, NTTP, ND, appropriate bowel sounds


: no SPT, no CVAT, gonzalez catheter is present


MSK: no pitting edema, no clubbing


Neuro: non-focal, moving all extremities





- Labs


CBC & Chem 7: 


                                 03/30/21 07:58





                                 03/30/21 07:58


Labs: 


                  Abnormal Lab Results - Last 24 Hours (Table)











  03/29/21 03/29/21 03/29/21 Range/Units





  04:26 16:31 20:22 


 


RBC     (4.30-5.90)  m/uL


 


Hgb     (13.0-17.5)  gm/dL


 


Hct     (39.0-53.0)  %


 


Lymphocytes #     (1.0-4.8)  k/uL


 


Sodium     (137-145)  mmol/L


 


Carbon Dioxide     (22-30)  mmol/L


 


BUN     (9-20)  mg/dL


 


Glucose     (74-99)  mg/dL


 


POC Glucose (mg/dL)   144 H  155 H  (75-99)  mg/dL


 


Calcium     (8.4-10.2)  mg/dL


 


Magnesium     (1.6-2.3)  mg/dL


 


Total Protein     (6.3-8.2)  g/dL


 


Albumin     (3.5-5.0)  g/dL


 


Crossmatch  See Detail    














  03/30/21 03/30/21 03/30/21 Range/Units





  06:11 07:58 07:58 


 


RBC   2.86 L   (4.30-5.90)  m/uL


 


Hgb   8.8 L D   (13.0-17.5)  gm/dL


 


Hct   27.1 L   (39.0-53.0)  %


 


Lymphocytes #   0.9 L   (1.0-4.8)  k/uL


 


Sodium    135 L  (137-145)  mmol/L


 


Carbon Dioxide    21 L  (22-30)  mmol/L


 


BUN    28 H  (9-20)  mg/dL


 


Glucose    113 H  (74-99)  mg/dL


 


POC Glucose (mg/dL)  117 H    (75-99)  mg/dL


 


Calcium    8.0 L  (8.4-10.2)  mg/dL


 


Magnesium    2.6 H  (1.6-2.3)  mg/dL


 


Total Protein    5.8 L  (6.3-8.2)  g/dL


 


Albumin    3.1 L  (3.5-5.0)  g/dL


 


Crossmatch     














  03/30/21 Range/Units





  11:43 


 


RBC   (4.30-5.90)  m/uL


 


Hgb   (13.0-17.5)  gm/dL


 


Hct   (39.0-53.0)  %


 


Lymphocytes #   (1.0-4.8)  k/uL


 


Sodium   (137-145)  mmol/L


 


Carbon Dioxide   (22-30)  mmol/L


 


BUN   (9-20)  mg/dL


 


Glucose   (74-99)  mg/dL


 


POC Glucose (mg/dL)  173 H  (75-99)  mg/dL


 


Calcium   (8.4-10.2)  mg/dL


 


Magnesium   (1.6-2.3)  mg/dL


 


Total Protein   (6.3-8.2)  g/dL


 


Albumin   (3.5-5.0)  g/dL


 


Crossmatch   














Assessment and Plan


Plan: 





NSTEMI S/P CABGX4 3/26


-Cardio and CT surgery following


-Telemetry monitoring.


-Continue daily aspirin, atorvastatin, and metoprolol.


-Lipid profile revealed elevation of triglycerides 200, cholesterol 241, LDL 

164, HDL of 37.  Atorvastatin increased to 80 mg nightly.





TIA


-Resolved


-Neurology following


-Continue aspirin, switch plavix to brilinta





Insulin-dependent diabetes mellitus type II


-Blood sugars controlled


-Continue SSI and lantus 20 units daily 


-Hemoglobin A1c = 8.2%





CODE STATUS: Full code


DVT prophylaxis: Heparin


Discussed with: Patient


Anticipated discharge date: 3-4 days


Anticipated discharge place: Home

## 2021-03-30 NOTE — XR
EXAMINATION TYPE: XR chest 2V

 

DATE OF EXAM: 3/30/2021

 

COMPARISON: 3/29/2021

 

HISTORY: 51-year-old male postcardiac surgery

 

TECHNIQUE:  PA and lateral views

 

FINDINGS:  

Median sternotomy wires are present with post-CABG clips in the mediastinum. Left-sided chest tube in
 place. Heart mildly enlarged. No appreciable pneumothorax. There does seem to be a new pleural-based
 density along the periphery of the left upper to midlung measuring 4.5 x 1.6 cm. Trace left effusion
 and patchy retrocardiac opacity. Right lung and pleural space appear clear.

 

 

IMPRESSION:  

 

1. Mild cardiomegaly with median sternotomy changes. Left-sided chest tube in place without appreciab
le pneumothorax.

2. Trace left effusion and patchy retrocardiac atelectasis.

3. A 4.5 x 1.6 cm pleural-based density along the lateral left midlung probably represents some locul
ated pleural fluid. Short interval follow-up recommended.

## 2021-03-30 NOTE — P.PN
Subjective





HISTORY OF PRESENTING ILLNESS


This is a pleasant 51-year-old  male past medical history significant 

for diabetes mellitus and daily alcohol intake. He follows in the office with 

Dr. Chavez in [place]. We have been asked to see in consultation for NSTEMI.  

He states for the previous 2 weeks he has been experiencing chest heaviness 

intermittently.  The symptoms occur typically at rest mostly at night when he 

lays down and tries to sleep.  When he lays down flat he feels a heaviness in 

his chest with some mild shortness of breath.  He denies any symptoms of chest 

discomfort or shortness of breath with exertion.  He has had some intermittent 

diarrhea.  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 

very physically active.  He had no symptoms of chest discomfort throughout the 

day.  The next morning he woke up and went out to breakfast and then had an 

episode of nausea and vomiting.  He was concerned with possible ovarian.  He 

came to the emergency department yesterday afternoon to be tested.  We have seen

and examined resting comfortably in bed in no acute distress.  He has no 

symptoms of chest discomfort or shortness of breath.





3/30/2021


Pt seen and examined sitting up in recliner in no acute distress. He feels like 

his breathing is getting better each day. He feels some pain at the site of his 

surgery, but no significant exertional chest pain. His facial droop and right 

sided weakness has resolved completely. Blood pressure 132/71 heart rate 97 

afebrile and maintaining oxygen saturation on nasal cannula.  Laboratory data 

reviewed, WBC 6.4, hemoglobin to 8.8, platelets 236, sodium 135, potassium 4.2 

and creatinine 0.96.





PHYSICAL EXAMINATION


CONSTITUTIONAL: No apparent distress. 


HEENT: Head is normocephalic. Pupils are equal, round. Sclerae anicteric. Mucous

membranes of the mouth are moist.  No JVD. Left carotid bruit, no bruit on the 

right.


CHEST EXAMINATION: Lungs are clear to auscultation. No chest wall tenderness is 

noted on palpation or with deep breathing. Left chest tube in place.


HEART EXAMINATION: Regular rate and rhythm. S1, S2 heard. Systolic ejection 

murmur at the base, no gallops or rub. Heart hugger in place. Mid-sternal 

dressing in place with no significant drainage noted.


EXTREMITIES: 2+ peripheral pulses, no lower extremity edema and no calf 

tenderness. 





ASSESSMENT


NSTEMI


Coronary artery disease s/p 4V bypass grafting POD#3


TIA


Carotid stenosis


Acute blood loss anemia


Diabetes mellitus


Daily alcohol intake


Dyslipidemia


Valvular heart disease s/p mitral valve repair





PLAN


Continue current medical regimen.


MR brain pending.


Further discussion with the patient regarding his carotid disease and he would 

prefer to have this handled as an outpatient by his primary cardiologist.


Further recommendations to follow based on clinical course. 





Nurse Practitioner note has been reviewed, I agree with a documented findings 

and plan of care.  Patient was seen and examined.





Objective





- Vital Signs


Vital signs: 


                                   Vital Signs











Temp  97.9 F   03/30/21 11:42


 


Pulse  97   03/30/21 11:42


 


Resp  20   03/30/21 11:42


 


BP  132/71   03/30/21 11:42


 


Pulse Ox  99   03/30/21 11:42








                                 Intake & Output











 03/29/21 03/30/21 03/30/21





 18:59 06:59 18:59


 


Intake Total 761 660 400


 


Output Total 650 1144 200


 


Balance 111 -484 200


 


Weight 88.6 kg 91 kg 


 


Intake:   


 


  Oral 480 660 400


 


  Blood Product 281  


 


    Rc Pheresis 2 As3  Unit 281  





    [id number]   


 


Output:   


 


  Chest Tube Drainage  50 


 


    Left Pleural Chest tube  50 


 


  Urine 650 975 200


 


  Post Void Residual  119 


 


Other:   


 


  Voiding Method Indwelling Catheter Urinal Urinal


 


  # Voids  1 








                       ABP, PAP, CO, CI - Last Documented











Arterial Blood Pressure        81/54


 


Pulmonary Artery Pressure      30/15


 


Cardiac Output                 5.3


 


Cardiac Index                  2.6

















- Labs


CBC & Chem 7: 


                                 03/30/21 07:58





                                 03/30/21 07:58


Labs: 


                  Abnormal Lab Results - Last 24 Hours (Table)











  03/29/21 03/29/21 03/29/21 Range/Units





  04:26 16:31 20:22 


 


RBC     (4.30-5.90)  m/uL


 


Hgb     (13.0-17.5)  gm/dL


 


Hct     (39.0-53.0)  %


 


Lymphocytes #     (1.0-4.8)  k/uL


 


Sodium     (137-145)  mmol/L


 


Carbon Dioxide     (22-30)  mmol/L


 


BUN     (9-20)  mg/dL


 


Glucose     (74-99)  mg/dL


 


POC Glucose (mg/dL)   144 H  155 H  (75-99)  mg/dL


 


Calcium     (8.4-10.2)  mg/dL


 


Magnesium     (1.6-2.3)  mg/dL


 


Total Protein     (6.3-8.2)  g/dL


 


Albumin     (3.5-5.0)  g/dL


 


Crossmatch  See Detail    














  03/30/21 03/30/21 03/30/21 Range/Units





  06:11 07:58 07:58 


 


RBC   2.86 L   (4.30-5.90)  m/uL


 


Hgb   8.8 L D   (13.0-17.5)  gm/dL


 


Hct   27.1 L   (39.0-53.0)  %


 


Lymphocytes #   0.9 L   (1.0-4.8)  k/uL


 


Sodium    135 L  (137-145)  mmol/L


 


Carbon Dioxide    21 L  (22-30)  mmol/L


 


BUN    28 H  (9-20)  mg/dL


 


Glucose    113 H  (74-99)  mg/dL


 


POC Glucose (mg/dL)  117 H    (75-99)  mg/dL


 


Calcium    8.0 L  (8.4-10.2)  mg/dL


 


Magnesium    2.6 H  (1.6-2.3)  mg/dL


 


Total Protein    5.8 L  (6.3-8.2)  g/dL


 


Albumin    3.1 L  (3.5-5.0)  g/dL


 


Crossmatch     














  03/30/21 Range/Units





  11:43 


 


RBC   (4.30-5.90)  m/uL


 


Hgb   (13.0-17.5)  gm/dL


 


Hct   (39.0-53.0)  %


 


Lymphocytes #   (1.0-4.8)  k/uL


 


Sodium   (137-145)  mmol/L


 


Carbon Dioxide   (22-30)  mmol/L


 


BUN   (9-20)  mg/dL


 


Glucose   (74-99)  mg/dL


 


POC Glucose (mg/dL)  173 H  (75-99)  mg/dL


 


Calcium   (8.4-10.2)  mg/dL


 


Magnesium   (1.6-2.3)  mg/dL


 


Total Protein   (6.3-8.2)  g/dL


 


Albumin   (3.5-5.0)  g/dL


 


Crossmatch

## 2021-03-30 NOTE — P.PN
Subjective


Progress Note Date: 03/30/21


Principal diagnosis: 





Multivessel coronary artery disease, status post CABG, postoperative day #4


On today's evaluation of 03/27/2021, the patient is postop day #1.  The patient 

was brought into the intensive care unit and he was hemodynamically stable and 

was extubated at around 9:15 PM yesterday.  This morning, the patient is able to

sit up on a recliner.  He remains extubated.  He remains on oxygen at 2 L per 

minute nasal cannula.  Note that since his surgery, the patient initially 

received 500 mL bolus of albumin 5%, after sitting up on a chair his blood 

pressure dropped again and we had to give another bolus of 500 mL of albumin.  

His urine output is improving.  Overnight, he was taken off the Primacor and he 

was started on levo fed initially 0.05 g and currently he is running at 0.11 

mcg/kg per minute.  Urine output is adequate for now.  BP is showing a systolic 

of 100-110.  Cardiac output is 5.4 with an index of 2.7.  No cardiac 

arrhythmias.  No atrial fibrillation.  His cardiac rhythm is sinus.  Chest tubes

are still in place.  Output from the mediastinal was 500 mL yesterday and left 

pleural is 300.  The patient has a total of 3 chest tubes left pleural, 

mediastinal 2.  The patient otherwise is doing well.  He did have a bout of 

blurred vision in his left eye yesterday without any neurologic deficit and 

currently is neurologically intact.  No aphasia.  No headaches.  No altered 

mentation.  Insulin drip is running at 1.5 units an hour and nitroglycerin drip 

drip is running at 5 units an hour regarding his radial artery harvest.  

Otherwise, no other significant events.  Chest x-ray shows adequate expansion of

both lungs.  No evidence of any pneumothorax.  There is a gastric bubble.  [place]-

Raj catheter is still in place.  Hemoglobin today is at 8.8.  Rest of the blood

work is all within normal limits.





On 03/28/2021 the patient is postop day #2.  Is able to sit up on a chair.  The 

[place]-Raj catheter has been removed.  The patient has the lower limb mediastinal

chest tubes still in place and output has been noted and output has considerably

dropped since yesterday.  He is hemodynamically requiring some pressors still 

and the patient is on 0.04 g kilogram per minute of norepinephrine infusion.  

Urine output is in order of 20 mL an hour.  The patient remains on insulin drip 

at 4.5 units an hour.  Blood sugars of been under adequate control.  Pulmonary 

status is stable.  He is on oxygen at 2 L, and he was transitioned to room air 

oxygen.  He has adequate pain control for now and this morning he refers this 

pain as being around 7 out of 10.  The chest x-ray from today showing a small 

pleural effusion on the left.  Right lung is showing some fluid within the 

fissure.  No consolidation.  No pneumothorax.  Chest tubes are all in place.  

There is some gastric bubble and the patient is passing gas and he is tolerating

his diet without any major difficulties.  Hemoglobin from today is 7.8





Patient was reevaluated today on 3/29/2021, he is postoperative day #3, CABG 4 

vessels, patient is sitting in bed, he is in the cardiac stepdown unit, not in 

any distress, receiving a unit of packed RBCs as we speak.  Apparently his 

hemoglobin was low, and he'll be receiving Lasix after his packed RBC 

transfusion.  Patient has a strong productive cough, he is compliant with his 

incentive spirometry, continues to have left pleural chest tube in place, he 

also had right sided facial droop with slurring words and right arm weakness 

hence code stroke was called, but his CT of the chest showed no acute change.  

Neurology is following.  Chest x-ray showed bibasilar atelectasis especially at 

the left base, and possibly a small left pleural effusion.  Hemoglobin today was

7 and the WBC count was 6.3.  Electrolytes are normal bicarb is a bit low at 18.

 Slight worsening of renal status is noted with a BUN of 36 creatinine is 1.55. 

Troponin is 4.180.





Patient was reevaluated today on 3/30/2021, patient is sitting in a bedside 

chair, on room air, doing great, patient is relatively asymptomatic, and his 

chest x-ray showed significant improvement in his left lower lobe atelectasis 

and pleural effusion.  Denies any cough no wheezing no shortness of breath.  His

chest x-ray also showed a lateral left midlung loculated pleural effusion.  Not 

large enough and not serious enough to consider thoracentesis, we will need to 

have mostly outpatient follow-up.  Chest x-ray also showed left retrocardiac 

atelectasis and small effusion.  Labs were reviewed hemoglobin is holding at 

8.80 left lites are normal renal profile is normal











Objective





- Vital Signs


Vital signs: 


                                   Vital Signs











Temp  98.8 F   03/30/21 16:15


 


Pulse  101 H  03/30/21 16:15


 


Resp  17   03/30/21 16:15


 


BP  121/71   03/30/21 16:15


 


Pulse Ox  96   03/30/21 16:15








                                 Intake & Output











 03/29/21 03/30/21 03/30/21





 18:59 06:59 18:59


 


Intake Total 761 660 400


 


Output Total 650 1144 400


 


Balance 111 -484 0


 


Weight 88.6 kg 91 kg 


 


Intake:   


 


  Oral 480 660 400


 


  Blood Product 281  


 


    Rc Pheresis 2 As3  Unit 281  





    [id number]   


 


Output:   


 


  Chest Tube Drainage  50 


 


    Left Pleural Chest tube  50 


 


  Urine 650 975 400


 


  Post Void Residual  119 


 


Other:   


 


  Voiding Method Indwelling Catheter Urinal Urinal


 


  # Voids  1 








                       ABP, PAP, CO, CI - Last Documented











Arterial Blood Pressure        81/54


 


Pulmonary Artery Pressure      30/15


 


Cardiac Output                 5.3


 


Cardiac Index                  2.6

















- Exam





CONSTITUTIONAL: Revealed a 51-year-old white male in no distress.


RESPIRATORY: Crackles at the left base, no rhonchi no wheezes, symmetrical chest

expansion.


CARDIOVASCULAR: Normal S1 and S2, no S3 gallop.


GASTROINTESTINAL: Abdomen is soft nontender no megaly no rebound no guarding.


Skin: No rashes.


NEUROLOGIC: Alert oriented 3 focal deficits.


MUSKULOSKELETAL: Equal strength bilaterally, no deformities.


PSYCHIATRIC: Normal mood, affect and normal mental status examination.














- Labs


CBC & Chem 7: 


                                 03/30/21 07:58





                                 03/30/21 07:58


Labs: 


                  Abnormal Lab Results - Last 24 Hours (Table)











  03/29/21 03/29/21 03/30/21 Range/Units





  04:26 20:22 06:11 


 


RBC     (4.30-5.90)  m/uL


 


Hgb     (13.0-17.5)  gm/dL


 


Hct     (39.0-53.0)  %


 


Lymphocytes #     (1.0-4.8)  k/uL


 


Sodium     (137-145)  mmol/L


 


Carbon Dioxide     (22-30)  mmol/L


 


BUN     (9-20)  mg/dL


 


Glucose     (74-99)  mg/dL


 


POC Glucose (mg/dL)   155 H  117 H  (75-99)  mg/dL


 


Calcium     (8.4-10.2)  mg/dL


 


Magnesium     (1.6-2.3)  mg/dL


 


Total Protein     (6.3-8.2)  g/dL


 


Albumin     (3.5-5.0)  g/dL


 


Crossmatch  See Detail    














  03/30/21 03/30/21 03/30/21 Range/Units





  07:58 07:58 11:43 


 


RBC  2.86 L    (4.30-5.90)  m/uL


 


Hgb  8.8 L D    (13.0-17.5)  gm/dL


 


Hct  27.1 L    (39.0-53.0)  %


 


Lymphocytes #  0.9 L    (1.0-4.8)  k/uL


 


Sodium   135 L   (137-145)  mmol/L


 


Carbon Dioxide   21 L   (22-30)  mmol/L


 


BUN   28 H   (9-20)  mg/dL


 


Glucose   113 H   (74-99)  mg/dL


 


POC Glucose (mg/dL)    173 H  (75-99)  mg/dL


 


Calcium   8.0 L   (8.4-10.2)  mg/dL


 


Magnesium   2.6 H   (1.6-2.3)  mg/dL


 


Total Protein   5.8 L   (6.3-8.2)  g/dL


 


Albumin   3.1 L   (3.5-5.0)  g/dL


 


Crossmatch     














  03/30/21 Range/Units





  16:49 


 


RBC   (4.30-5.90)  m/uL


 


Hgb   (13.0-17.5)  gm/dL


 


Hct   (39.0-53.0)  %


 


Lymphocytes #   (1.0-4.8)  k/uL


 


Sodium   (137-145)  mmol/L


 


Carbon Dioxide   (22-30)  mmol/L


 


BUN   (9-20)  mg/dL


 


Glucose   (74-99)  mg/dL


 


POC Glucose (mg/dL)  182 H  (75-99)  mg/dL


 


Calcium   (8.4-10.2)  mg/dL


 


Magnesium   (1.6-2.3)  mg/dL


 


Total Protein   (6.3-8.2)  g/dL


 


Albumin   (3.5-5.0)  g/dL


 


Crossmatch   














Assessment and Plan


Assessment: 





Impression:


Status post CABG for triple-vessel coronary artery disease, patient had a four-

vessel CABG. postoperative day #4


Status post mitral valve repair.


Postoperative acute blood loss anemia, expected.


Postoperative atelectasis and small left pleural effusion


Acute kidney injury


Life long nonsmoker


Acute TIA versus acute ischemic stroke, being addressed by neurology


History of carotid artery disease.





Recommendation:


Continue present supportive care measures.


Continue GI and DVT prophylaxis


Continue incentive spirometry


Continue CIWA protocol since the patient has history of alcohol abuse.


Continue strict I's and O's.


Continue aspirin statins and beta blockers and brilinta


Increase activity as tolerated


Incentive spirometry


Continue ambulation.


Will follow as needed.











Time with Patient: Less than 30

## 2021-03-31 VITALS — RESPIRATION RATE: 16 BRPM

## 2021-03-31 LAB
ANION GAP SERPL CALC-SCNC: 8 MMOL/L
BUN SERPL-SCNC: 23 MG/DL (ref 9–20)
CALCIUM SPEC-MCNC: 7.6 MG/DL (ref 8.4–10.2)
CHLORIDE SERPL-SCNC: 104 MMOL/L (ref 98–107)
CO2 SERPL-SCNC: 20 MMOL/L (ref 22–30)
ERYTHROCYTE [DISTWIDTH] IN BLOOD BY AUTOMATED COUNT: 2.54 M/UL (ref 4.3–5.9)
ERYTHROCYTE [DISTWIDTH] IN BLOOD: 13.5 % (ref 11.5–15.5)
GLUCOSE BLD-MCNC: 167 MG/DL (ref 75–99)
GLUCOSE BLD-MCNC: 189 MG/DL (ref 75–99)
GLUCOSE BLD-MCNC: 223 MG/DL (ref 75–99)
GLUCOSE BLD-MCNC: 258 MG/DL (ref 75–99)
GLUCOSE SERPL-MCNC: 176 MG/DL (ref 74–99)
HCT VFR BLD AUTO: 23.4 % (ref 39–53)
HGB BLD-MCNC: 8.1 GM/DL (ref 13–17.5)
MCH RBC QN AUTO: 32 PG (ref 25–35)
MCHC RBC AUTO-ENTMCNC: 34.7 G/DL (ref 31–37)
MCV RBC AUTO: 92.2 FL (ref 80–100)
PLATELET # BLD AUTO: 229 K/UL (ref 150–450)
POTASSIUM SERPL-SCNC: 4.3 MMOL/L (ref 3.5–5.1)
SODIUM SERPL-SCNC: 132 MMOL/L (ref 137–145)
WBC # BLD AUTO: 4.8 K/UL (ref 3.8–10.6)

## 2021-03-31 PROCEDURE — B34KZZZ ULTRASONOGRAPHY OF BILATERAL UPPER EXTREMITY ARTERIES: ICD-10-PCS

## 2021-03-31 RX ADMIN — IPRATROPIUM BROMIDE AND ALBUTEROL SULFATE SCH ML: .5; 3 SOLUTION RESPIRATORY (INHALATION) at 16:18

## 2021-03-31 RX ADMIN — IPRATROPIUM BROMIDE AND ALBUTEROL SULFATE SCH ML: .5; 3 SOLUTION RESPIRATORY (INHALATION) at 19:42

## 2021-03-31 RX ADMIN — INSULIN ASPART SCH UNIT: 100 INJECTION, SOLUTION INTRAVENOUS; SUBCUTANEOUS at 12:02

## 2021-03-31 RX ADMIN — METOPROLOL TARTRATE SCH MG: 25 TABLET, FILM COATED ORAL at 21:38

## 2021-03-31 RX ADMIN — METOPROLOL TARTRATE SCH MG: 25 TABLET, FILM COATED ORAL at 07:39

## 2021-03-31 RX ADMIN — SODIUM CHLORIDE, PRESERVATIVE FREE SCH ML: 5 INJECTION INTRAVENOUS at 21:45

## 2021-03-31 RX ADMIN — FOLIC ACID SCH MG: 1 TABLET ORAL at 07:38

## 2021-03-31 RX ADMIN — TICAGRELOR SCH MG: 90 TABLET ORAL at 07:39

## 2021-03-31 RX ADMIN — PANTOPRAZOLE SODIUM SCH MG: 40 TABLET, DELAYED RELEASE ORAL at 07:39

## 2021-03-31 RX ADMIN — INSULIN ASPART SCH UNIT: 100 INJECTION, SOLUTION INTRAVENOUS; SUBCUTANEOUS at 21:41

## 2021-03-31 RX ADMIN — HYDROCODONE BITARTRATE AND ACETAMINOPHEN PRN EACH: 7.5; 325 TABLET ORAL at 15:37

## 2021-03-31 RX ADMIN — INSULIN ASPART SCH UNIT: 100 INJECTION, SOLUTION INTRAVENOUS; SUBCUTANEOUS at 17:37

## 2021-03-31 RX ADMIN — DOCUSATE SODIUM AND SENNOSIDES SCH EACH: 50; 8.6 TABLET ORAL at 21:38

## 2021-03-31 RX ADMIN — MAGNESIUM HYDROXIDE PRN MG: 2400 SUSPENSION ORAL at 07:53

## 2021-03-31 RX ADMIN — HEPARIN SODIUM SCH UNIT: 5000 INJECTION, SOLUTION INTRAVENOUS; SUBCUTANEOUS at 15:37

## 2021-03-31 RX ADMIN — HEPARIN SODIUM SCH UNIT: 5000 INJECTION, SOLUTION INTRAVENOUS; SUBCUTANEOUS at 23:11

## 2021-03-31 RX ADMIN — HEPARIN SODIUM SCH UNIT: 5000 INJECTION, SOLUTION INTRAVENOUS; SUBCUTANEOUS at 07:39

## 2021-03-31 RX ADMIN — SODIUM CHLORIDE, PRESERVATIVE FREE SCH ML: 5 INJECTION INTRAVENOUS at 07:53

## 2021-03-31 RX ADMIN — IPRATROPIUM BROMIDE AND ALBUTEROL SULFATE SCH ML: .5; 3 SOLUTION RESPIRATORY (INHALATION) at 09:06

## 2021-03-31 RX ADMIN — ATORVASTATIN CALCIUM SCH MG: 80 TABLET, FILM COATED ORAL at 07:38

## 2021-03-31 RX ADMIN — HYDROCODONE BITARTRATE AND ACETAMINOPHEN PRN EACH: 7.5; 325 TABLET ORAL at 07:38

## 2021-03-31 RX ADMIN — INSULIN ASPART SCH UNIT: 100 INJECTION, SOLUTION INTRAVENOUS; SUBCUTANEOUS at 07:39

## 2021-03-31 RX ADMIN — ASPIRIN 81 MG CHEWABLE TABLET SCH MG: 81 TABLET CHEWABLE at 07:38

## 2021-03-31 RX ADMIN — INSULIN DETEMIR SCH UNIT: 100 INJECTION, SOLUTION SUBCUTANEOUS at 07:39

## 2021-03-31 RX ADMIN — IPRATROPIUM BROMIDE AND ALBUTEROL SULFATE SCH ML: .5; 3 SOLUTION RESPIRATORY (INHALATION) at 12:48

## 2021-03-31 RX ADMIN — HYDROCODONE BITARTRATE AND ACETAMINOPHEN PRN EACH: 7.5; 325 TABLET ORAL at 03:17

## 2021-03-31 RX ADMIN — HYDROCODONE BITARTRATE AND ACETAMINOPHEN PRN EACH: 7.5; 325 TABLET ORAL at 21:39

## 2021-03-31 RX ADMIN — INSULIN ASPART SCH UNIT: 100 INJECTION, SOLUTION INTRAVENOUS; SUBCUTANEOUS at 21:40

## 2021-03-31 RX ADMIN — Medication SCH MG: at 07:39

## 2021-03-31 RX ADMIN — TICAGRELOR SCH MG: 90 TABLET ORAL at 21:39

## 2021-03-31 NOTE — XR
EXAMINATION TYPE: XR chest 2V

 

DATE OF EXAM: 3/31/2021

 

COMPARISON: 3/30/2021

 

INDICATION: Postop

 

TECHNIQUE:  Frontal and lateral views of the chest are obtained.

 

FINDINGS:  

The heart size is mildly prominent.  

The pulmonary vasculature is normal.

There is a small left pleural effusion. Sternotomy wires are present from CABG. Mild subsegmental ate
lectasis may be on the left. Findings appear improved..

 

IMPRESSION:  

1. Mild left lower lobe pleural effusion with scattered small infiltrates.

2. There is continuing improvement from comparison.

3. Cardiomegaly

## 2021-03-31 NOTE — P.PN
Subjective





HISTORY OF PRESENTING ILLNESS


This is a pleasant 51-year-old  male past medical history significant 

for diabetes mellitus and daily alcohol intake. He follows in the office with 

Dr. Chavez in [place]. We have been asked to see in consultation for NSTEMI.  

He states for the previous 2 weeks he has been experiencing chest heaviness 

intermittently.  The symptoms occur typically at rest mostly at night when he 

lays down and tries to sleep.  When he lays down flat he feels a heaviness in 

his chest with some mild shortness of breath.  He denies any symptoms of chest 

discomfort or shortness of breath with exertion.  He has had some intermittent 

diarrhea.  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 

very physically active.  He had no symptoms of chest discomfort throughout the 

day.  The next morning he woke up and went out to breakfast and then had an 

episode of nausea and vomiting.  He was concerned with possible ovarian.  He 

came to the emergency department yesterday afternoon to be tested.  We have seen

and examined resting comfortably in bed in no acute distress.  He has no 

symptoms of chest discomfort or shortness of breath.





3/30/2021


Pt seen and examined sitting up in recliner in no acute distress. He denies 

chest pain or shortness of breath. He states his sternal site is sore. He had a 

nose bleed last night that resolved on its own spontaneously. He is concerned 

today about having a bowel movement. Heart hugger is in place. Chest tube and 

pacer wires are removed. Chest xray this morning revealed mild left lower lobe 

pleural effusion with scattered small infiltrates with ongoing improvement. 

Blood pressure 124/65 heart rate 86





PHYSICAL EXAMINATION


CONSTITUTIONAL: No apparent distress. 


HEENT: Head is normocephalic. Pupils are equal, round. Sclerae anicteric. Mucous

membranes of the mouth are moist.  No JVD. Left carotid bruit, no bruit on the 

right.


CHEST EXAMINATION: Lungs are clear to auscultation. No chest wall tenderness is 

noted on palpation or with deep breathing. Left chest tube in place.


HEART EXAMINATION: Regular rate and rhythm. S1, S2 heard. Systolic ejection 

murmur at the base, no gallops or rub. Heart hugger in place. Mid-sternal 

dressing in place with no significant drainage noted.


EXTREMITIES: 2+ peripheral pulses, no lower extremity edema and no calf tenderne

ss. 





ASSESSMENT


NSTEMI


Coronary artery disease s/p 4V bypass grafting POD#3


TIA


Carotid stenosis


Acute blood loss anemia


Diabetes mellitus


Daily alcohol intake


Dyslipidemia


Valvular heart disease s/p mitral valve repair





PLAN


Continue current medical regimen.


Encourage activity and increasing his ambulation in the halls with assistance as

needed.


Further discussion with the patient regarding his carotid disease and he would 

prefer to have this handled as an outpatient by his primary cardiologist.


Further recommendations to follow based on clinical course. 





Nurse Practitioner note has been reviewed, I agree with a documented findings 

and plan of care.  Patient was seen and examined.





Objective





- Vital Signs


Vital signs: 


                                   Vital Signs











Temp  98.7 F   03/31/21 07:43


 


Pulse  86   03/31/21 09:20


 


Resp  18   03/31/21 08:00


 


BP  124/65   03/31/21 07:43


 


Pulse Ox  97   03/31/21 07:43








                                 Intake & Output











 03/30/21 03/31/21 03/31/21





 18:59 06:59 18:59


 


Intake Total 400  


 


Output Total 400 500 


 


Balance 0 -500 


 


Weight  90.5 kg 


 


Intake:   


 


  Oral 400  


 


Output:   


 


  Urine 400 500 


 


Other:   


 


  Voiding Method Urinal Urinal Urinal








                       ABP, PAP, CO, CI - Last Documented











Arterial Blood Pressure        81/54


 


Pulmonary Artery Pressure      30/15


 


Cardiac Output                 5.3


 


Cardiac Index                  2.6

















- Labs


CBC & Chem 7: 


                                 03/31/21 06:51





                                 03/31/21 06:51


Labs: 


                  Abnormal Lab Results - Last 24 Hours (Table)











  03/30/21 03/30/21 03/30/21 Range/Units





  11:43 16:49 20:12 


 


RBC     (4.30-5.90)  m/uL


 


Hgb     (13.0-17.5)  gm/dL


 


Hct     (39.0-53.0)  %


 


Sodium     (137-145)  mmol/L


 


Carbon Dioxide     (22-30)  mmol/L


 


BUN     (9-20)  mg/dL


 


Glucose     (74-99)  mg/dL


 


POC Glucose (mg/dL)  173 H  182 H  225 H  (75-99)  mg/dL


 


Calcium     (8.4-10.2)  mg/dL














  03/31/21 03/31/21 03/31/21 Range/Units





  06:51 06:51 07:26 


 


RBC  2.54 L    (4.30-5.90)  m/uL


 


Hgb  8.1 L    (13.0-17.5)  gm/dL


 


Hct  23.4 L    (39.0-53.0)  %


 


Sodium   132 L   (137-145)  mmol/L


 


Carbon Dioxide   20 L   (22-30)  mmol/L


 


BUN   23 H   (9-20)  mg/dL


 


Glucose   176 H   (74-99)  mg/dL


 


POC Glucose (mg/dL)    189 H  (75-99)  mg/dL


 


Calcium   7.6 L   (8.4-10.2)  mg/dL

## 2021-03-31 NOTE — P.PN
Subjective


Progress Note Date: 03/31/21


Principal diagnosis: 





Triple vessel diffuse coronary artery disease, moderate to severe mitral 

regurgitation, moderate left ventricular dysfunction.  Previous medical history 

of insulin-dependent diabetes, family history of premature coronary artery 

disease, EtOH use most days without history of withdrawal, and newly diagnosed 

hyperlipidemia and severe left carotid stenosis. Preop nasal swab positive for 

MSSA





POD #5 coronary artery bypass grafting 4 vessels with the left internal mammary

artery to the left anterior descending artery, reverse saphenous vein graft from

the aorta to the first diagonal artery, left radial artery taken proximally from

the previous vein graft and anastomosed distally to the distal circumflex 

artery, reverse saphenous vein graft from the aorta to the right coronary 

artery, mitral valve repair with posterior annuloplasty using a #28 incomplete 

AnnuloFlex ring, exclusion of the left atrial appendage with a 35 mm AtriClip, 

graft flow measurements using the Medistim system, endoscopic harvesting of the 

left radial artery, endoscopic harvesting of the left greater saphenous vein 

from the groin to just below the knee level, intraoperative transesophageal 

echocardiogram and epi-aortic scanning.





Postoperative acute blood loss anemia, expected given hemodilution and 

cardiopulmonary bypass pump





Facial droop, slurred speech, right arm weakness with resolution less than 3 

hours, likely TIA with no residual and no further occurrence of symptoms





Patient currently sitting up in a recliner on the cardiac stepdown unit no acute

distress.  Complains of postoperative chest pain controlled with current 

medication regimen, actively using incentive spirometry, strong productive 

cough.  Patient had nosebleed last night, resolved this morning.  Remains in 

sinus rhythm, hemodynamically stable.  Remains completely neurologically intact,

no facial droop is present, he is oriented 3, his speech is appropriate and he 

has equal strength bilaterally, no further incidence of previous TIA symptoms.  

He did ambulate in the hallway yesterday with standby assist.  He has been 

tolerating diet.  Left pleural chest tube was discontinued yesterday.  No other 

further concerns.





Objective





- Vital Signs


Vital signs: 


                                   Vital Signs











Temp  98.7 F   03/31/21 07:43


 


Pulse  94   03/31/21 07:43


 


Resp  18   03/31/21 07:43


 


BP  124/65   03/31/21 07:43


 


Pulse Ox  97   03/31/21 07:43








                                 Intake & Output











 03/30/21 03/31/21 03/31/21





 18:59 06:59 18:59


 


Intake Total 400  


 


Output Total 400 500 


 


Balance 0 -500 


 


Weight  90.5 kg 


 


Intake:   


 


  Oral 400  


 


Output:   


 


  Urine 400 500 


 


Other:   


 


  Voiding Method Urinal Urinal 








                       ABP, PAP, CO, CI - Last Documented











Arterial Blood Pressure        81/54


 


Pulmonary Artery Pressure      30/15


 


Cardiac Output                 5.3


 


Cardiac Index                  2.6

















- Exam





CONSTITUTIONAL: Appears comfortable, cooperative, no acute distress


RESPIRATORY:  Lungs sounds diminished bilaterally.  Respirations even, 

nonlabored.  Currently on room air with oxygen saturation 95%.  Able to achieve 

1000 mL on incentive spirometry.  Strong productive cough.  


CARDIOVASCULAR:  S1, S2 present.  Regular rate and rhythm, sinus rhythm on 

telemetry.  Sternum stable.   Palpable peripheral pulses bilaterally.  Trace 

upper extremity edema present.  No calf pain or tenderness noted.  Heart hugger 

in place with patient demonstrating appropriate use.  Antiembolism stockings, 

SCDs present.


GASTROINTESTINAL:  Abdomen soft, nontender, nondistended.  Active bowel sounds 

present 4 quadrants.  Positive flatus, negative bowel movement since surgery.


GENITOURINARY:  Continues to void 200-250 mL at a time


INTEGUMENTARY:  Skin is warm and dry with evidence of good perfusion.  Anterior 

chest incision well approximated and covered with dry intact dressing.  EVH site

well approximated without redness or drainage.  Left radial artery harvest site 

well approximated, patient denies numbness or tingling, able to wiggle all fing

ers and  appropriately, good cap refill.


NEUROLOGIC:  Cranial nerves II through XII intact


MUSKULOSKELETAL:  Able to move all extremities, strength equal bilaterally


PSYCHIATRIC:  Alert and oriented to person place and time, appropriate affect, 

intact judgment and insight








- Allied health notes


Allied health notes reviewed: nursing





- Labs


CBC & Chem 7: 


                                 03/31/21 06:51





                                 03/31/21 06:51


Labs: 


                  Abnormal Lab Results - Last 24 Hours (Table)











  03/30/21 03/30/21 03/30/21 Range/Units





  07:58 07:58 11:43 


 


RBC  2.86 L    (4.30-5.90)  m/uL


 


Hgb  8.8 L D    (13.0-17.5)  gm/dL


 


Hct  27.1 L    (39.0-53.0)  %


 


Lymphocytes #  0.9 L    (1.0-4.8)  k/uL


 


Sodium   135 L   (137-145)  mmol/L


 


Carbon Dioxide   21 L   (22-30)  mmol/L


 


BUN   28 H   (9-20)  mg/dL


 


Glucose   113 H   (74-99)  mg/dL


 


POC Glucose (mg/dL)    173 H  (75-99)  mg/dL


 


Calcium   8.0 L   (8.4-10.2)  mg/dL


 


Magnesium   2.6 H   (1.6-2.3)  mg/dL


 


Total Protein   5.8 L   (6.3-8.2)  g/dL


 


Albumin   3.1 L   (3.5-5.0)  g/dL














  03/30/21 03/30/21 03/31/21 Range/Units





  16:49 20:12 06:51 


 


RBC    2.54 L  (4.30-5.90)  m/uL


 


Hgb    8.1 L  (13.0-17.5)  gm/dL


 


Hct    23.4 L  (39.0-53.0)  %


 


Lymphocytes #     (1.0-4.8)  k/uL


 


Sodium     (137-145)  mmol/L


 


Carbon Dioxide     (22-30)  mmol/L


 


BUN     (9-20)  mg/dL


 


Glucose     (74-99)  mg/dL


 


POC Glucose (mg/dL)  182 H  225 H   (75-99)  mg/dL


 


Calcium     (8.4-10.2)  mg/dL


 


Magnesium     (1.6-2.3)  mg/dL


 


Total Protein     (6.3-8.2)  g/dL


 


Albumin     (3.5-5.0)  g/dL














  03/31/21 03/31/21 Range/Units





  06:51 07:26 


 


RBC    (4.30-5.90)  m/uL


 


Hgb    (13.0-17.5)  gm/dL


 


Hct    (39.0-53.0)  %


 


Lymphocytes #    (1.0-4.8)  k/uL


 


Sodium  132 L   (137-145)  mmol/L


 


Carbon Dioxide  20 L   (22-30)  mmol/L


 


BUN  23 H   (9-20)  mg/dL


 


Glucose  176 H   (74-99)  mg/dL


 


POC Glucose (mg/dL)   189 H  (75-99)  mg/dL


 


Calcium  7.6 L   (8.4-10.2)  mg/dL


 


Magnesium    (1.6-2.3)  mg/dL


 


Total Protein    (6.3-8.2)  g/dL


 


Albumin    (3.5-5.0)  g/dL














- Imaging and Cardiology


Chest x-ray: image reviewed





Assessment and Plan


Assessment: 





1.  Triple-vessel diffuse coronary artery disease, non-STEMI this admission, 

status post four-vessel CABG


2.  Moderate to severe predominantly centrally directed mitral regurgitation, 

status post mitral valve repair


3.  Insulin-dependent diabetes, hemoglobin A1c 8.2%


4.  Hyperlipidemia, cholesterol 241, triglyceride 200, 


5.  Left internal carotid artery stenosis, greater than 70% per carotid Doppler,

greater than 90% per neck CTA


6.  Lifelong nonsmoker with FEV1 58% of predicted 


7.  Family history of premature coronary artery disease, father and 2 brothers 

diagnosed with CAD with subsequent CABG in their early to mid 50s


8.  EtOH use most days without history of withdrawal


9.  Preop nasal swab positive for MSSA


10.  Postoperative acute blood loss anemia, expected


11.  TIA with symptom resolution less than 3 hours and no recurrence


Plan: 





1.  Continue aspirin, statin, Brilinta, beta blocker therapy.  Will increase 

beta blocker therapy as tolerated.  


2.  Encourage incentive spirometry use 10 times every hour while awake


3.  Increase activity, ambulate as tolerated, out of bed to chair for breakfast 

until bedtime.  PT/OT/cardiac rehab following.  First postoperative shower today


4.  Will monitor daily labs and x-rays.  Electrolyte replacement per protocol.  


5.  GI/DVT prophylaxis


6.  Pain control with current medication regimen


7.  Insulin management per primary care service.  Needs tight blood sugar 

control for healing


8.  [facility] protocol to monitor for alcohol withdrawal.  Continue thiamine, folic 

acid


9.  Left carotid stenosis intervention to be completed in the future, neurology 

recommending within the next week.  Avoid hypotension


10.  Continue neuro checks every 4 hours


11.  MRI not recommended per Dr. Saleem in view of risk of dislodging recent 

clips on conduits as well as no academic benefit


12.  Strict accurate intake and output.  Daily weights.


13.  Discharge planning in progress.  Anticipate discharge to home with home 

care in the next 24 hours.  Will arrange quick follow-up with Dr. Chavez for 

management of left ICA stenosis


14.  More recommendations to follow depending on patient's progress


Time with Patient: Greater than 30

## 2021-03-31 NOTE — P.PN
Subjective


Progress Note Date: 03/31/21


Principal diagnosis: 





Myocardial infarction





Patient had slight nosebleed last night, resolved. Had some constipation as 

well, started on laxatives. Doing well overall, ambulating. 





Objective





- Vital Signs


Vital signs: 


                                   Vital Signs











Temp  98.7 F   03/31/21 12:00


 


Pulse  92   03/31/21 13:27


 


Resp  18   03/31/21 13:27


 


BP  132/74   03/31/21 13:27


 


Pulse Ox  96   03/31/21 12:00








                                 Intake & Output











 03/30/21 03/31/21 03/31/21





 18:59 06:59 18:59


 


Intake Total 400  


 


Output Total 400 500 


 


Balance 0 -500 


 


Weight  90.5 kg 


 


Intake:   


 


  Oral 400  


 


Output:   


 


  Urine 400 500 


 


Other:   


 


  Voiding Method Urinal Urinal Urinal








                       ABP, PAP, CO, CI - Last Documented











Arterial Blood Pressure        81/54


 


Pulmonary Artery Pressure      30/15


 


Cardiac Output                 5.3


 


Cardiac Index                  2.6

















- Exam





Gen: No acute distress, extubated


HEENT: normocephalic, atraumatic, moist mucous membranes


Resp: Clear bilaterally


CVS: good distal perfusion x 4, regular rate and rhythm without murmurs


GI: soft, NTTP, ND, appropriate bowel sounds


: no SPT, no CVAT, gonzalez catheter is present


MSK: no pitting edema, no clubbing


Neuro: non-focal, moving all extremities





- Labs


CBC & Chem 7: 


                                 03/31/21 06:51





                                 03/31/21 06:51


Labs: 


                  Abnormal Lab Results - Last 24 Hours (Table)











  03/30/21 03/30/21 03/31/21 Range/Units





  16:49 20:12 06:51 


 


RBC    2.54 L  (4.30-5.90)  m/uL


 


Hgb    8.1 L  (13.0-17.5)  gm/dL


 


Hct    23.4 L  (39.0-53.0)  %


 


Sodium     (137-145)  mmol/L


 


Carbon Dioxide     (22-30)  mmol/L


 


BUN     (9-20)  mg/dL


 


Glucose     (74-99)  mg/dL


 


POC Glucose (mg/dL)  182 H  225 H   (75-99)  mg/dL


 


Calcium     (8.4-10.2)  mg/dL














  03/31/21 03/31/21 03/31/21 Range/Units





  06:51 07:26 11:51 


 


RBC     (4.30-5.90)  m/uL


 


Hgb     (13.0-17.5)  gm/dL


 


Hct     (39.0-53.0)  %


 


Sodium  132 L    (137-145)  mmol/L


 


Carbon Dioxide  20 L    (22-30)  mmol/L


 


BUN  23 H    (9-20)  mg/dL


 


Glucose  176 H    (74-99)  mg/dL


 


POC Glucose (mg/dL)   189 H  223 H  (75-99)  mg/dL


 


Calcium  7.6 L    (8.4-10.2)  mg/dL














Assessment and Plan


Plan: 





NSTEMI S/P CABGX4 3/26


-Cardio and CT surgery following


-Telemetry monitoring.


-Continue daily aspirin, atorvastatin, and metoprolol.


-Lipid profile revealed elevation of triglycerides 200, cholesterol 241, LDL 

164, HDL of 37.  Atorvastatin increased to 80 mg nightly.





TIA


-Resolved


-Neurology consulted, recommending left carotid stenosis fix within a week. 

Follow up arranged.


-Continue aspirin, switch plavix to brilinta





Insulin-dependent diabetes mellitus type II


-Blood sugars slightly high, will increase lantus to 25 units daily. 


-Continue SSI


-Hemoglobin A1c = 8.2%





CODE STATUS: Full code


DVT prophylaxis: Heparin


Discussed with: Patient


Anticipated discharge date: Tomorrow


Anticipated discharge place: Home

## 2021-03-31 NOTE — P.ARTDOP
Arterial Doppler





Bilateral radial artery testing:





Reason for study: Preop CABG





Date of study: 03/23/2021





Findings: With Doppler assessment we find no significant right to left or 

segmental pressure gradients.  Using digital plethysmography with radial artery 

compression, we see no significant pressure changes.  On imaging the right 

radial is 3.7 x 3.0 mm distally, 3.6 x 3.6 mm mid, and 4.2 x 3.3 mm proximally. 

Left radial is 3.6 x 3.1 mm distally, 3.9 x 3.6 mm mid, and 3.5 x 3.6 mm 

proximally.





Impression: Both radial arteries meet criterion for use.

## 2021-04-01 VITALS — TEMPERATURE: 98 F

## 2021-04-01 VITALS — HEART RATE: 87 BPM | DIASTOLIC BLOOD PRESSURE: 74 MMHG | SYSTOLIC BLOOD PRESSURE: 121 MMHG

## 2021-04-01 LAB
ANION GAP SERPL CALC-SCNC: 7 MMOL/L
BUN SERPL-SCNC: 18 MG/DL (ref 9–20)
CALCIUM SPEC-MCNC: 7.9 MG/DL (ref 8.4–10.2)
CHLORIDE SERPL-SCNC: 101 MMOL/L (ref 98–107)
CO2 SERPL-SCNC: 25 MMOL/L (ref 22–30)
ERYTHROCYTE [DISTWIDTH] IN BLOOD BY AUTOMATED COUNT: 2.78 M/UL (ref 4.3–5.9)
ERYTHROCYTE [DISTWIDTH] IN BLOOD: 13.4 % (ref 11.5–15.5)
GLUCOSE BLD-MCNC: 156 MG/DL (ref 75–99)
GLUCOSE BLD-MCNC: 229 MG/DL (ref 75–99)
GLUCOSE SERPL-MCNC: 176 MG/DL (ref 74–99)
HCT VFR BLD AUTO: 25.7 % (ref 39–53)
HGB BLD-MCNC: 8.8 GM/DL (ref 13–17.5)
MAGNESIUM SPEC-SCNC: 2.6 MG/DL (ref 1.6–2.3)
MCH RBC QN AUTO: 31.5 PG (ref 25–35)
MCHC RBC AUTO-ENTMCNC: 34.1 G/DL (ref 31–37)
MCV RBC AUTO: 92.3 FL (ref 80–100)
PLATELET # BLD AUTO: 278 K/UL (ref 150–450)
POTASSIUM SERPL-SCNC: 4.5 MMOL/L (ref 3.5–5.1)
SODIUM SERPL-SCNC: 133 MMOL/L (ref 137–145)
WBC # BLD AUTO: 5.4 K/UL (ref 3.8–10.6)

## 2021-04-01 RX ADMIN — IPRATROPIUM BROMIDE AND ALBUTEROL SULFATE SCH ML: .5; 3 SOLUTION RESPIRATORY (INHALATION) at 12:02

## 2021-04-01 RX ADMIN — Medication SCH MG: at 07:41

## 2021-04-01 RX ADMIN — INSULIN ASPART SCH UNIT: 100 INJECTION, SOLUTION INTRAVENOUS; SUBCUTANEOUS at 07:41

## 2021-04-01 RX ADMIN — IPRATROPIUM BROMIDE AND ALBUTEROL SULFATE SCH: .5; 3 SOLUTION RESPIRATORY (INHALATION) at 09:21

## 2021-04-01 RX ADMIN — HEPARIN SODIUM SCH UNIT: 5000 INJECTION, SOLUTION INTRAVENOUS; SUBCUTANEOUS at 07:44

## 2021-04-01 RX ADMIN — ASPIRIN 81 MG CHEWABLE TABLET SCH MG: 81 TABLET CHEWABLE at 07:41

## 2021-04-01 RX ADMIN — PANTOPRAZOLE SODIUM SCH MG: 40 TABLET, DELAYED RELEASE ORAL at 07:41

## 2021-04-01 RX ADMIN — SODIUM CHLORIDE, PRESERVATIVE FREE SCH ML: 5 INJECTION INTRAVENOUS at 07:43

## 2021-04-01 RX ADMIN — TICAGRELOR SCH MG: 90 TABLET ORAL at 07:41

## 2021-04-01 RX ADMIN — INSULIN ASPART SCH UNIT: 100 INJECTION, SOLUTION INTRAVENOUS; SUBCUTANEOUS at 13:17

## 2021-04-01 RX ADMIN — FOLIC ACID SCH MG: 1 TABLET ORAL at 07:41

## 2021-04-01 RX ADMIN — HYDROCODONE BITARTRATE AND ACETAMINOPHEN PRN EACH: 7.5; 325 TABLET ORAL at 03:49

## 2021-04-01 RX ADMIN — ATORVASTATIN CALCIUM SCH MG: 80 TABLET, FILM COATED ORAL at 07:41

## 2021-04-01 RX ADMIN — INSULIN ASPART SCH UNIT: 100 INJECTION, SOLUTION INTRAVENOUS; SUBCUTANEOUS at 13:18

## 2021-04-01 RX ADMIN — KETOROLAC TROMETHAMINE SCH: 15 INJECTION, SOLUTION INTRAMUSCULAR; INTRAVENOUS at 11:31

## 2021-04-01 NOTE — XR
EXAMINATION TYPE: XR chest 2V

 

DATE OF EXAM: 4/1/2021

 

COMPARISON: 3/31/2020

 

INDICATION: Postcardiac surgery

 

TECHNIQUE:  Frontal and lateral views of the chest are obtained.

 

FINDINGS:  

The heart size is enlarged.  

The pulmonary vasculature is normal.

Mild left lower lobe infiltrate is present.  Small effusion may be present on the left

 

IMPRESSION:  

1. Small left pleural effusion and/or left lower lobe infiltrate slightly increased from comparison

## 2021-04-01 NOTE — P.PN
Subjective


Progress Note Date: 04/01/21


Principal diagnosis: 





Myocardial infarction





Feeling well, no complaints. No pain or sob.





Objective





- Vital Signs


Vital signs: 


                                   Vital Signs











Temp  97.8 F   04/01/21 04:00


 


Pulse  96   04/01/21 04:00


 


Resp  16   04/01/21 04:00


 


BP  140/80   04/01/21 04:00


 


Pulse Ox  99   04/01/21 04:00








                                 Intake & Output











 03/31/21 04/01/21 04/01/21





 18:59 06:59 18:59


 


Output Total  500 


 


Balance  -500 


 


Weight  89.9 kg 


 


Output:   


 


  Urine  500 


 


Other:   


 


  Voiding Method Urinal Urinal 


 


  # Voids  1 


 


  # Bowel Movements  1 








                       ABP, PAP, CO, CI - Last Documented











Arterial Blood Pressure        81/54


 


Pulmonary Artery Pressure      30/15


 


Cardiac Output                 5.3


 


Cardiac Index                  2.6

















- Exam





Gen: No acute distress, extubated


HEENT: normocephalic, atraumatic, moist mucous membranes


Resp: Clear bilaterally


CVS: good distal perfusion x 4, regular rate and rhythm without murmurs


GI: soft, NTTP, ND, appropriate bowel sounds


: no SPT, no CVAT, gonzalez catheter is present


MSK: no pitting edema, no clubbing


Neuro: non-focal, moving all extremities





- Labs


CBC & Chem 7: 


                                 03/31/21 06:51





                                 03/31/21 06:51


Labs: 


                  Abnormal Lab Results - Last 24 Hours (Table)











  03/31/21 03/31/21 03/31/21 Range/Units





  11:51 16:46 20:05 


 


POC Glucose (mg/dL)  223 H  167 H  258 H  (75-99)  mg/dL














  04/01/21 Range/Units





  07:25 


 


POC Glucose (mg/dL)  229 H  (75-99)  mg/dL














Assessment and Plan


Plan: 





NSTEMI S/P CABGX4 3/26


-Cardio and CT surgery following


-Telemetry monitoring.


-Continue daily aspirin, atorvastatin, and metoprolol.


-Lipid profile revealed elevation of triglycerides 200, cholesterol 241, LDL 

164, HDL of 37.  Atorvastatin increased to 80 mg nightly.





TIA


-Resolved


-Neurology consulted, recommending left carotid stenosis fix within a week. 

Follow up arranged.


-Continue aspirin, switch plavix to brilinta





Insulin-dependent diabetes mellitus type II


-Blood sugars slightly high, will increase lantus to 25 units daily. 


-Will also increase aspart to 12 units tid. 


-Continue SSI


-Hemoglobin A1c = 8.2%





CODE STATUS: Full code


DVT prophylaxis: Heparin


Discussed with: Patient


Anticipated discharge date: Tomorrow


Anticipated discharge place: Home

## 2021-04-01 NOTE — P.DS
Providers


Date of admission: 


03/22/21 12:54





Expected date of discharge: 04/01/21


Attending physician: 


Lucy Saleem





Consults: 





                                        





03/22/21 12:39


Consult Physician Urgent 


   Consulting Provider: [facility]


   Consult Reason/Comments: NSTEMI


   Do you want consulting provider notified?: Yes





03/23/21 10:20


Consult Physician Routine 


   Consulting Provider: Jerry Elise


   Consult Reason/Comments: nstemi, cad, 3+MR


   Do you want consulting provider notified?: Already Contacted





03/24/21 06:45


Consult Physician Routine 


   Consulting Provider: Marcio Moore


   Consult Reason/Comments: preop cab/mvr


   Do you want consulting provider notified?: Already Contacted





03/25/21 07:37


Consult to Anesthesia Routine 


   Consulting Provider: [facility]


   Consult Reason/Comments: Cardiac Surgery Pre-Op





03/26/21 16:02


Consult Physician Routine 


   Consulting Provider: Casi Rudolph


   Consult Reason/Comments: med mgmt


   Do you want consulting provider notified?: Already Contacted





03/29/21 04:21


Consult Physician Stat 


   Consulting Provider: Irma Ureña


   Consult Reason/Comments: code stroke


   Do you want consulting provider notified?: Yes











Primary care physician: 


Physician Nonstaff





Hospital Course: 





FINAL DIAGNOSIS: 


1.  Triple-vessel diffuse coronary artery disease, non-STEMI this admission


2.  Moderate to severe predominantly centrally directed mitral regurgitation


3.  Uncontrolled insulin for diabetes with hyperglycemia, hemoglobin A1c 8.2%


4.  Hyperlipidemia, cholesterol 241, triglyceride 200, 


5.  Left internal carotid artery stenosis, greater than 90% per neck CTA


6.  Lifelong nonsmoker with FEV1 58% of predicted


7.  Family history of premature coronary artery disease


8.  EtOH use most days without history of withdrawal


9.  Preoperative nasal swab positive for MSSA, treated


10.  Postoperative acute blood loss anemia, expected


11.  TIA with symptom resolution less than 3 hours and no recurrence





PRINCIPAL PROCEDURE: 


1.  Urgent coronary artery bypass grafting 4 vessels with the left internal 

mammary artery to the left anterior descending artery, reverse saphenous vein 

graft from the aorta to the first diagonal artery, left radial artery taken 

proximally from the previous vein graft and anastomosed distally to the distal 

circumflex artery, reverse saphenous vein graft from the aorta to the right 

coronary artery


2.  Mitral valve repair with posterior annuloplasty using a #28 incomplete 

AnnuloFlex ring


3.  Exclusion of the left atrial appendage with a 35 mm AtriClip


4.  Graft flow measurements using the Medistim system


5.  Endoscopic harvesting of the left radial artery


6.  Endoscopic harvesting of the left greater saphenous vein from the groin to 

just below the knee level


7.  Intraoperative transesophageal echocardiogram and epi-aortic scanning





HISTORY OF PRESENT ILLNESS: This is a 51-year-old active gentleman who follows 

on an outpatient basis with Dr. Sacha Perla for primary care and Dr. Manolo Chavez for cardiology.  Apparently he began experiencing chest heaviness when 

laying down at night with resolution upon sitting up.  He was pretty active 

during the day.  Subsequently he began to experience generalized malaise and 

fatigue, diaphoresis, nausea, chills, body aches along with diarrhea.  His 

symptoms did resolve for a couple of days and then returned.  He was due to go 

to [place] and was concerned that he might have Covid so he came into the 

emergency room for testing which was negative.  He did, however, have EKG 

changes with T-wave inversion in lead 3 and aVF.  Troponins were elevated at 3.1

with Max troponin 7.8 and he was ruled in for non-STEMI.  The patient was 

started on IV heparin and was admitted for evaluation treatment with 

consultation placed to cardiology.  He was recommended to undergo heart 

catheterization which demonstrated ostial LAD stenosis 85%, mid LAD stenosis 60-

70%, first diagonal stenosis 80%, second diagonal stenosis 90%, mid RCA stenosis

95% followed by 100% distal RCA stenosis, and mid circumflex stenosis 50% with 

left to right collaterals to the PLV.  Transesophageal echocardiogram was also 

completed demonstrating mild posterior leaflet tethering of the mitral valve f

rom inferior hypokinesis, moderate to severe predominantly centrally directed 

mitral regurgitation with additional smaller secondary posteriorly directed 

regurgitant jet related to the posterior leaflet tethering.  Vena contracted of 

0.67 m, piece estimated 0.397 cm.  There was basal and, inferior, and 

anteroseptal hypokinesis with EF 40-45%.  Due to these findings consultation was

placed to cardiothoracic surgery.  He was recommended to undergo urgent coronary

artery bypass along with mitral valve repair.  The usual perioperative course 

was discussed in detail with the patient and his mother, all risks and benefits 

were explained, all questions were answered, the patient's primary cardiologist 

was updated by Dr. Saleem, and consent was obtained to proceed with surgery.  

The patient was kept inpatient due to the nature of his disease process.





HOSPITAL COURSE: The patient was brought to the preoperative area, prepared in 

the usual fashion, and subsequently taken to the operating room where Dr. Saleem

performed four-vessel CABG along with mitral valve repair. Upon completion of 

surgery the patient was transferred to the cardiovascular intensive care unit 

where he was recovered and monitored hemodynamically.  He was extubated, all 

lines, tubes, and drips were discontinued when appropriate, and he was 

transferred to 3 S. cardiac stepdown unit for further monitoring and rehabilita

tion.  He did have a brief episode of right-sided facial droop, slurred speech, 

and right arm weakness.  CT scan of the brain and CTA of the neck were 

completed, neither revealed any acute process.  The patient's symptoms resolved 

without any repeat occurrences.  Neurology was consulted and did recommend left 

carotid stenting soon, the patient prefered to be evaluated on an outpatient 

basis by his own cardiologist.  His oxygen was titrated down, he continued to 

work with physical and occupational therapy, he was tolerating oral diet, his 

pain was controlled, and he was ready to be discharged to home with [facility]

care on postoperative day #6.  He received written and verbal instruction 

regarding his medications, activity restrictions, signs and symptoms requiring 

physician notification, and follow-up appointments.  He is to keep a record of 

his blood sugars to take to his primary care physician office.  He will see his 

primary cardiologist next week for planning regarding left carotid stenting.





COMPLICATIONS: The patient experienced postoperative acute blood loss anemia and

TIA which were treated accordingly.








Patient Condition at Discharge: Serious





Plan - Discharge Summary


Discharge Rx Participant: No


New Discharge Prescriptions: 


New


   Aspirin 81 mg PO DAILY #30 chew


   HYDROcodone/APAP 7.5-325MG [Norco 7.5-325] 1 each PO Q6H PRN #28 tab


     PRN Reason: Pain


   amLODIPine [Norvasc] 2.5 mg PO DAILY@1200 #30 tab


   Sennosides-Docusate Sodium [Senokot-S] 2 each PO HS PRN  tab


     PRN Reason: Constipation


   Acetaminophen Tab [Tylenol] 650 mg PO Q4HR PRN  tab


     PRN Reason: Fever And/ Or Pain


   Ticagrelor [Brilinta] 90 mg PO BID #60 tab


   Folic Acid 1 mg PO DAILY #30 tab


   Atorvastatin [Lipitor] 80 mg PO DAILY #30 tab


   Metoprolol Tartrate [Lopressor] 25 mg PO BID #60 tab


   Pantoprazole [Protonix] 40 mg PO AC-BRKFST #30 tablet.


   Thiamine [Vitamin B-1] 100 mg PO DAILY #30 tab





Changed


   Insulin Glargine,Hum.rec.anlog [Lantus Solostar] 25 unit SQ DAILY #1 pen


   Insulin Aspart [NovoLOG Flexpen] 12 units SQ AC-TID #2 pen


Discharge Medication List





Acetaminophen Tab [Tylenol] 650 mg PO Q4HR PRN  tab 04/01/21 [Rx]


Aspirin 81 mg PO DAILY #30 chew 04/01/21 [Rx]


Atorvastatin [Lipitor] 80 mg PO DAILY #30 tab 04/01/21 [Rx]


Folic Acid 1 mg PO DAILY #30 tab 04/01/21 [Rx]


HYDROcodone/APAP 7.5-325MG [Norco 7.5-325] 1 each PO Q6H PRN #28 tab 04/01/21 

[Rx]


Insulin Aspart [NovoLOG Flexpen] 12 units SQ AC-TID #2 pen 04/01/21 [Rx]


Insulin Glargine,Hum.rec.anlog [Lantus Solostar] 25 unit SQ DAILY #1 pen 

04/01/21 [Rx]


Metoprolol Tartrate [Lopressor] 25 mg PO BID #60 tab 04/01/21 [Rx]


Pantoprazole [Protonix] 40 mg PO AC-BRKFST #30 tablet. 04/01/21 [Rx]


Sennosides-Docusate Sodium [Senokot-S] 2 each PO HS PRN  tab 04/01/21 [Rx]


Thiamine [Vitamin B-1] 100 mg PO DAILY #30 tab 04/01/21 [Rx]


Ticagrelor [Brilinta] 90 mg PO BID #60 tab 04/01/21 [Rx]


amLODIPine [Norvasc] 2.5 mg PO DAILY@1200 #30 tab 04/01/21 [Rx]








Follow up Appointment(s)/Referral(s): 


Manolo Chavez MD [REFERRING] - 04/06/21 3:40 pm


Lucy Saleem MD [STAFF PHYSICIAN] - 4 Weeks (Office will call with appointment 

)


Mary Carmen Keita NPC [Nurse Practitioner] - 04/28/21 2:30 pm


[facility], [NON-STAFF] - 


Nonstaff,Physician [Primary Care Provider] - 04/07/21 11:00 am (Dr. Sacha Perla)


Ambulatory/Diagnostic Orders: 


Complete Blood Count w/diff [LAB.AMB] Time Frame: 3 Days, Location: None 

Selected


Comprehensive Metabolic Panel [LAB.AMB] Time Frame: 3 Days, Location: None 

Selected


Activity/Diet/Wound Care/Special Instructions: 


DISCHARGE INSTRUCTIONS: 


1.  No driving for 4 weeks, or until physician gives their ok.


2.  The patient should sleep in their own bed, no medical bed needed.


3.  Stairs are not an issue.  If the bedroom is upstairs, it is advised that the

 patient go up at night and down in the morning for the first week.  Go slowly, 

using handrail and take 1 step at a time.


4.  CHAVO hose are to be worn for 30 days or until physician discontinues.


5.  Heart hugger is to be worn 100% of the time until physician 

discontinues.(except when showering)


6.  No lifting, pushing, or pulling more than 10 pounds for 12 weeks.  The 

physician will advise of any restriction changes.


7.  The patient is expected to continue the prescribed walking program.


8.  Continue pain control per as needed orders.


9.  Continue with incentive spirometry and splinting/heart hugger until 

otherwise directed by the physician.


10. Must shower daily using liquid antibacterial soap and a separate white 

washcloth for each individual incision.


11. Routine sternal incision care. No powders, lotions, ointments on incisions. 

 No dressings are necessary on incisions unless they are draining.  Dermabond 

tape is to remain on sternal incision until surgeon follow-up.


12. Please call surgeon/NP for temp greater than 101 F or purulent drainage from

 incisions.


13. Narcotic medications were discussed with the patient, including the 

potential for misuse, addiction, and abuse. Opiod Start Talking form was 

reviewed with the patient.


14. All prescriptions given by surgeon for 30 days.  Refills need to be filled 

through cardiologist/primary care physician.


15. A Red armband has been placed on the patient.  It should be worn for 30 days

 post surgery and will be removed by the cardiac surgeons.  If an ER visit is 

necessary, please make sure the number on the Red armband is called.


16.  Please keep a record of your blood sugars and take with you to your 

appointment with Dr. Perla


17. You have been referred to and are expected to begin Cardiac Rehab in 

approximately 4-6 weeks.





HOME HEALTH SERVICES TO PROVIDE:  


   RN SKILLED HOME CARE SERVICES FOR POST-OP SURGICAL PATIENTS WITH THE 

FOLLOWING: Coronary Artery Bypass Surgery (CABG), Mitral Valve 

Replacement/Repair ( MVR), Aortic Valve Replacement/Repair (AVR)


   RN TO CONTINUE EDUCATION FROM ``ROAD TO A HEALTH HEART PATIENT EDUCATION 

MANUAL (GIVEN TO PATIENT IN THE HOSPITAL)


   MEDICATION RECONCILIATION WITH EDUCATION AS NEEDED ON FIRST HOME VISIT


   EMPHASIZE IMPORTANCE OF WEARING BREAST SUPPORT/HEART HUGGER


   ENCOURAGE USE OF INCENTIVE SPIROMETER 10 X EVERY HOUR WHILE AWAKE


   ENCOURAGE UTILIZATION OF LOWER EXTREMITY COMPRESSION STOCKINGS/CHAVO HOSE and 

ELEVATE LEGS ABOVE LEVEL OF HEART WHILE AT REST.  


   ENCOURAGE AMBULATION 3-5x/day  INCREASING AS TOLERATES, WHILE AVOIDING 

EXTREMES IN TEMPERATURE





FREQUENCY: RN TO OPEN THE PATIENT WITHIN 24 HOURS OF DISCHARGE FROM THE HOSPITAL

 WITH [place] [facility] [place] TO VISIT 2-3 X A WEEK FOR 4 WEEKS AS 

ESTABLISHED BY PATIENT NEEDS.





LABORATORY:


   CBC, CMP TO BE DRAWN ON THE THIRD DAY HOME,  (RAN AS STAT) FAX RESULTS TO 

[phone].


               


TELEHEALTH PARAMETERS:


   WEIGHT: NOTIFY MD OF WEIGHT GAIN OF 2 LBS IN 24 HOURS OR 5 LBS IN ONE WEEK


   HR: NOTIFY MD OF HR <55 BPM OR HR>100 BPM


   BP: NOTIFY MD IF BP <90/55 OR BP>140/100


   O2 SAT: NOTIFY MD IF PO2<93% ON ROOM AIR





SEND TELEHEALTH REPORT TO CARDIOLOGIST AND CARDIOVASCULAR SURGEON THE FIRST WEEK

 OF CARE AND THEN BI-WEEKLY.  PLEASE ADDITIONALLY COMMUNICATE ANY ABNORMALS AND 

NEW FINDINGS TO THE SURGEONS OFFICE.





For any questions or concerns please call NPs Fatuma @ [phone] or Don @ 

[phone]

## 2021-04-01 NOTE — P.PN
Subjective


Progress Note Date: 04/01/21


Principal diagnosis: 





Triple vessel diffuse coronary artery disease, moderate to severe mitral 

regurgitation, moderate left ventricular dysfunction.  Previous medical history 

of insulin-dependent diabetes, family history of premature coronary artery 

disease, EtOH use most days without history of withdrawal, and newly diagnosed 

hyperlipidemia and severe left carotid stenosis. Preop nasal swab positive for 

MSSA





POD #6 coronary artery bypass grafting 4 vessels with the left internal mammary

artery to the left anterior descending artery, reverse saphenous vein graft from

the aorta to the first diagonal artery, left radial artery taken proximally from

the previous vein graft and anastomosed distally to the distal circumflex 

artery, reverse saphenous vein graft from the aorta to the right coronary 

artery, mitral valve repair with posterior annuloplasty using a #28 incomplete 

AnnuloFlex ring, exclusion of the left atrial appendage with a 35 mm AtriClip, 

graft flow measurements using the Medistim system, endoscopic harvesting of the 

left radial artery, endoscopic harvesting of the left greater saphenous vein 

from the groin to just below the knee level, intraoperative transesophageal 

echocardiogram and epi-aortic scanning.





Postoperative acute blood loss anemia, expected given hemodilution and 

cardiopulmonary bypass pump





Facial droop, slurred speech, right arm weakness with resolution less than 3 

hours, likely TIA with no residual and no further occurrence of symptoms





Patient currently sitting up in a recliner on the cardiac stepdown unit no acute

distress.  Complains of postoperative chest pain controlled with current 

medication regimen, actively using incentive spirometry, strong productive 

cough.  Remains in sinus rhythm, hemodynamically stable.  Remains completely 

neurologically intact, no facial droop is present, he is oriented 3, his speech

is appropriate and he has equal strength bilaterally, no further incidence of 

previous TIA symptoms.  He did ambulate in the hallway yesterday with standby 

assist.  He has been tolerating diet.  He does need encouragement to use his 

incentive spirometer and ambulate in the hallway.  Had bowel movement and 

received first postoperative shower yesterday.  He does complain of a bit of 

numbness to his right lateral thigh, he is able to feel light touch and ambulate

without difficulty.





Objective





- Vital Signs


Vital signs: 


                                   Vital Signs











Temp  97.8 F   04/01/21 04:00


 


Pulse  96   04/01/21 04:00


 


Resp  16   04/01/21 04:00


 


BP  140/80   04/01/21 04:00


 


Pulse Ox  99   04/01/21 04:00








                                 Intake & Output











 03/31/21 04/01/21 04/01/21





 18:59 06:59 18:59


 


Output Total  500 


 


Balance  -500 


 


Weight  89.9 kg 


 


Output:   


 


  Urine  500 


 


Other:   


 


  Voiding Method Urinal Urinal 


 


  # Voids  1 


 


  # Bowel Movements  1 








                       ABP, PAP, CO, CI - Last Documented











Arterial Blood Pressure        81/54


 


Pulmonary Artery Pressure      30/15


 


Cardiac Output                 5.3


 


Cardiac Index                  2.6

















- Exam





CONSTITUTIONAL: Appears comfortable, cooperative, no acute distress


RESPIRATORY:  Lungs sounds diminished bilaterally.  Respirations even, 

nonlabored.  Currently on room air with oxygen saturation 99%.  Able to achieve 

1000 mL on incentive spirometry with encouragement.  Strong productive cough.  


CARDIOVASCULAR:  S1, S2 present.  Regular rate and rhythm, sinus rhythm on 

telemetry.  Sternum stable.   Palpable peripheral pulses bilaterally.  Trace 

upper extremity edema present.  No calf pain or tenderness noted.  Heart hugger 

in place with patient demonstrating appropriate use.  Antiembolism stockings, 

SCDs present.


GASTROINTESTINAL:  Abdomen soft, nontender, nondistended.  Active bowel sounds 

present 4 quadrants.  Positive bowel movement 


GENITOURINARY:  Continues to void 


INTEGUMENTARY:  Skin is warm and dry with evidence of good perfusion.  Anterior 

chest incision well approximated.  EVH site well approximated without redness or

drainage.  Left radial artery harvest site well approximated, patient denies 

numbness or tingling, able to wiggle all fingers and  appropriately, good 

cap refill.


NEUROLOGIC:  Cranial nerves II through XII intact


MUSKULOSKELETAL:  Able to move all extremities, strength equal bilaterally


PSYCHIATRIC:  Alert and oriented to person place and time, appropriate affect, 

intact judgment and insight








- Labs


CBC & Chem 7: 


                                 03/31/21 06:51





                                 03/31/21 06:51


Labs: 


                  Abnormal Lab Results - Last 24 Hours (Table)











  03/31/21 03/31/21 03/31/21 Range/Units





  11:51 16:46 20:05 


 


POC Glucose (mg/dL)  223 H  167 H  258 H  (75-99)  mg/dL














  04/01/21 Range/Units





  07:25 


 


POC Glucose (mg/dL)  229 H  (75-99)  mg/dL














- Imaging and Cardiology


Chest x-ray: image reviewed (labs still pending)





Assessment and Plan


Assessment: 





1.  Triple-vessel diffuse coronary artery disease, non-STEMI this admission, 

status post four-vessel CABG


2.  Moderate to severe predominantly centrally directed mitral regurgitation, 

status post mitral valve repair


3.  Uncontrolled insulin-dependent diabetes with hyperglycemia, hemoglobin A1c 

8.2%


4.  Hyperlipidemia, cholesterol 241, triglyceride 200, 


5.  Left internal carotid artery stenosis, greater than 70% per carotid Doppler,

greater than 90% per neck CTA


6.  Lifelong nonsmoker with FEV1 58% of predicted 


7.  Family history of premature coronary artery disease, father and 2 brothers 

diagnosed with CAD with subsequent CABG in their early to mid 50s


8.  EtOH use most days without history of withdrawal


9.  Preop nasal swab positive for MSSA


10.  Postoperative acute blood loss anemia, expected


11.  TIA with symptom resolution less than 3 hours and no recurrence


Plan: 





1.  Continue aspirin, statin, Brilinta, beta blocker therapy.  


2.  Encourage incentive spirometry use 10 times every hour while awake


3.  Increase activity, ambulate as tolerated, out of bed to chair for breakfast 

until bedtime.  PT/OT/cardiac rehab following.  


4.  Will monitor daily labs and x-rays.  Electrolyte replacement per protocol.  


5.  GI/DVT prophylaxis


6.  Pain control with current medication regimen


7.  Insulin management per primary care service.  Needs tight blood sugar 

control for healing.  Proper diet discussed with patient by dietitian, internal 

medicine, and cardiothoracic to make better meal choices


8.  [facility] protocol to monitor for alcohol withdrawal.  Continue thiamine, folic 

acid


9.  Left carotid stenosis intervention to be completed in the future, neurology 

recommending within the next week.  Avoid hypotension


10.  Continue neuro checks every 4 hours


11.  MRI not recommended per Dr. Saleem in view of risk of dislodging recent 

clips on conduits as well as no academic benefit


12.  Strict accurate intake and output.  Daily weights.


13.  Discharge planning in progress.  Anticipate discharge to home with home 

care this afternoon.  Patient to follow-up with Dr. Chavez next Tuesday, 

04/06/2011 for management of left ICA stenosis





Time with Patient: Greater than 30

## 2021-04-01 NOTE — P.PN
Subjective


Progress Note Date: 04/01/21





HISTORY OF PRESENT ILLNESS: 





3/27/2021


Patient is status post CABG 4: left internal mammary artery to the left 

anterior descending artery, left radial artery to the distal circumflex, reverse

saphenous vein graft from the aorta to the first diagonal artery, reverse 

saphenous vein graft from the aorta to the right coronary artery. patient 

examined this morning the intensive care unit.  Patient was extubated yesterday.

 He is currently sitting up in the chair.  Patient did become slightly 

hypotensive this morning and is on vasopressors.  Patient reports mild surgical 

discomfort in the chest.  He states his breathing does not feel labored.  He 

does not get short of breath.  Patient does report difficulty taking a deep 

breath due to pain.  He is pulling 5007 150 mL on his incentive spirometer. he 

is maintaining sinus mechanism on telemetry.





3/28/2021


Patient examined this morning in the intensive care unit.  Patient is sitting up

in the chair.  He continues to report post surgical pain but states it is 

tolerable at this time.  He reports using his incentive spirometer and pulling 

[id number] mL.  He remains in sinus mechanism on telemetry.  He has been weaned off

of his vasopressors.





4/1/2021


patient examined this morning the bedside.  He denies chest pain or pressure.  

He denies shortness of breath.  He states he is pulling 1000cc on incentive 

spirometry.  He states he is being discharged home this afternoon.





PHYSICAL EXAM: 


VITAL SIGNS: Reviewed.


GENERAL: Well-developed in no acute distress. 


NECK: Supple. No JVD or thyromegaly


LUNGS: Respirations even and unlabored. Lung sounds diminished bilaterally


HEART: Regular rate and rhythm.  S1 and S2 heard. 


EXTREMITIES: Normal range of motion.  No clubbing or cyanosis.  Peripheral 

pulses intact. No lower extremity edema





ASSESSMENT: 


Non-STEMI, triple vessel coronary artery disease status post CABG 4


Moderate to severe mitral regurgitation, status post mitral valve repair


TIA


Left internal carotid artery stenosis, greater than 70% per carotid Doppler, 

greater than 90% per neck CTA


Diabetes mellitus


Daily alcohol intake


Dyslipidemia


Family history of premature coronary artery disease


Acute blood loss anemia





PLAN: 


Continue postoperative management per CTS


Continue current cardiac medications


Multiple discussions were had with patient regarding carotid disease. Patient 

declining any inpatient intervention to be performed and requesting to have this

handled by his primary cardiologist, Dr. Chavez. 


Patient may be discharged home today from a cardiac perspective


Patient to follow up with his cardiologist, Dr. Chavez








Nurse practitioner note has been reviewed by physician. Signing provider agrees 

with the documented findings, assessment, and plan of care. 








Objective





- Vital Signs


Vital signs: 


                                   Vital Signs











Temp  98.0 F   04/01/21 08:00


 


Pulse  88   04/01/21 12:22


 


Resp  16   04/01/21 08:00


 


BP  121/74   04/01/21 12:00


 


Pulse Ox  96   04/01/21 12:00








                                 Intake & Output











 03/31/21 04/01/21 04/01/21





 18:59 06:59 18:59


 


Intake Total   460


 


Output Total  500 


 


Balance  -500 460


 


Weight  89.9 kg 


 


Intake:   


 


  Oral   460


 


Output:   


 


  Urine  500 


 


Other:   


 


  Voiding Method Urinal Urinal Urinal


 


  # Voids  1 


 


  # Bowel Movements  1 








                       ABP, PAP, CO, CI - Last Documented











Arterial Blood Pressure        81/54


 


Pulmonary Artery Pressure      30/15


 


Cardiac Output                 5.3


 


Cardiac Index                  2.6

















- Labs


CBC & Chem 7: 


                                 04/01/21 09:58





                                 04/01/21 09:58


Labs: 


                  Abnormal Lab Results - Last 24 Hours (Table)











  03/31/21 03/31/21 04/01/21 Range/Units





  16:46 20:05 07:25 


 


RBC     (4.30-5.90)  m/uL


 


Hgb     (13.0-17.5)  gm/dL


 


Hct     (39.0-53.0)  %


 


Sodium     (137-145)  mmol/L


 


Glucose     (74-99)  mg/dL


 


POC Glucose (mg/dL)  167 H  258 H  229 H  (75-99)  mg/dL


 


Calcium     (8.4-10.2)  mg/dL


 


Magnesium     (1.6-2.3)  mg/dL














  04/01/21 04/01/21 04/01/21 Range/Units





  09:58 09:58 11:59 


 


RBC  2.78 L    (4.30-5.90)  m/uL


 


Hgb  8.8 L    (13.0-17.5)  gm/dL


 


Hct  25.7 L    (39.0-53.0)  %


 


Sodium   133 L   (137-145)  mmol/L


 


Glucose   176 H   (74-99)  mg/dL


 


POC Glucose (mg/dL)    156 H  (75-99)  mg/dL


 


Calcium   7.9 L   (8.4-10.2)  mg/dL


 


Magnesium   2.6 H   (1.6-2.3)  mg/dL

## 2021-04-23 ENCOUNTER — HOSPITAL ENCOUNTER (OUTPATIENT)
Dept: HOSPITAL 47 - RADXRMAIN | Age: 51
Discharge: HOME | End: 2021-04-23
Attending: NURSE PRACTITIONER
Payer: COMMERCIAL

## 2021-04-23 DIAGNOSIS — J98.11: ICD-10-CM

## 2021-04-23 DIAGNOSIS — J90: Primary | ICD-10-CM

## 2021-04-23 PROCEDURE — 71046 X-RAY EXAM CHEST 2 VIEWS: CPT

## 2021-04-23 NOTE — XR
EXAMINATION TYPE: XR chest 2V

 

DATE OF EXAM: 4/23/2021

 

COMPARISON: 4/1/2021

 

HISTORY: 51-year-old male R07.9, chest pain

 

TECHNIQUE:  Frontal and lateral views

 

FINDINGS:  

Median sternotomy wires are present with post CABG clips. Generator device overlies the left side of 
the chest. There is a residual small left effusion with adjacent opacity at the left base. Right lung
 and pleural space are clear. Findings are relatively similar to prior exam.

 

 

IMPRESSION:  

Small left effusion with adjacent atelectasis and/or consolidation, similar to prior exam.